# Patient Record
Sex: MALE | Race: WHITE | Employment: OTHER | URBAN - METROPOLITAN AREA
[De-identification: names, ages, dates, MRNs, and addresses within clinical notes are randomized per-mention and may not be internally consistent; named-entity substitution may affect disease eponyms.]

---

## 2017-02-17 ENCOUNTER — HOSPITAL ENCOUNTER (OUTPATIENT)
Dept: CT IMAGING | Age: 77
Discharge: HOME | End: 2017-02-17
Attending: NURSE PRACTITIONER
Payer: MEDICARE

## 2017-02-17 VITALS — BODY MASS INDEX: 26.92 KG/M2 | WEIGHT: 188 LBS | HEIGHT: 70 IN

## 2017-02-17 DIAGNOSIS — R31.0 GROSS HEMATURIA: ICD-10-CM

## 2017-02-17 LAB — CREAT BLD-MCNC: 1.2 MG/DL (ref 0.6–1)

## 2017-02-17 PROCEDURE — 74011000258 HC RX REV CODE- 258: Performed by: NURSE PRACTITIONER

## 2017-02-17 PROCEDURE — 74011636320 HC RX REV CODE- 636/320: Performed by: NURSE PRACTITIONER

## 2017-02-17 PROCEDURE — 74178 CT ABD&PLV WO CNTR FLWD CNTR: CPT

## 2017-02-17 PROCEDURE — 82565 ASSAY OF CREATININE: CPT

## 2017-02-17 RX ORDER — SODIUM CHLORIDE 0.9 % (FLUSH) 0.9 %
10 SYRINGE (ML) INJECTION
Status: COMPLETED | OUTPATIENT
Start: 2017-02-17 | End: 2017-02-17

## 2017-02-17 RX ADMIN — Medication 10 ML: at 16:50

## 2017-02-17 RX ADMIN — SODIUM CHLORIDE 100 ML: 900 INJECTION, SOLUTION INTRAVENOUS at 16:50

## 2017-02-17 RX ADMIN — IOVERSOL 100 ML: 741 INJECTION INTRA-ARTERIAL; INTRAVENOUS at 16:50

## 2017-02-24 PROCEDURE — 88112 CYTOPATH CELL ENHANCE TECH: CPT | Performed by: UROLOGY

## 2017-02-27 ENCOUNTER — HOSPITAL ENCOUNTER (OUTPATIENT)
Dept: LAB | Age: 77
Discharge: HOME | End: 2017-02-27

## 2017-04-20 ENCOUNTER — HOSPITAL ENCOUNTER (EMERGENCY)
Age: 77
Discharge: HOME | End: 2017-04-20
Attending: EMERGENCY MEDICINE
Payer: MEDICARE

## 2017-04-20 VITALS
RESPIRATION RATE: 22 BRPM | WEIGHT: 187 LBS | HEIGHT: 70 IN | SYSTOLIC BLOOD PRESSURE: 182 MMHG | OXYGEN SATURATION: 98 % | TEMPERATURE: 98.4 F | DIASTOLIC BLOOD PRESSURE: 81 MMHG | HEART RATE: 56 BPM | BODY MASS INDEX: 26.77 KG/M2

## 2017-04-20 DIAGNOSIS — R31.9 BLOOD IN URINE: Primary | ICD-10-CM

## 2017-04-20 LAB
ALBUMIN SERPL BCP-MCNC: 3.8 G/DL (ref 3.2–4.6)
ALBUMIN/GLOB SERPL: 1.1 {RATIO} (ref 1.2–3.5)
ALP SERPL-CCNC: 57 U/L (ref 50–136)
ALT SERPL-CCNC: 21 U/L (ref 12–65)
ANION GAP BLD CALC-SCNC: 6 MMOL/L (ref 7–16)
AST SERPL W P-5'-P-CCNC: 19 U/L (ref 15–37)
BACTERIA URNS QL MICRO: 0 /HPF
BASOPHILS # BLD AUTO: 0 K/UL (ref 0–0.2)
BASOPHILS # BLD: 0 % (ref 0–2)
BILIRUB SERPL-MCNC: 0.4 MG/DL (ref 0.2–1.1)
BUN SERPL-MCNC: 21 MG/DL (ref 8–23)
CALCIUM SERPL-MCNC: 8.9 MG/DL (ref 8.3–10.4)
CASTS URNS QL MICRO: NORMAL /LPF
CHLORIDE SERPL-SCNC: 107 MMOL/L (ref 98–107)
CO2 SERPL-SCNC: 30 MMOL/L (ref 21–32)
CREAT SERPL-MCNC: 1.52 MG/DL (ref 0.8–1.5)
CRYSTALS URNS QL MICRO: 0 /LPF
DIFFERENTIAL METHOD BLD: ABNORMAL
EOSINOPHIL # BLD: 0.2 K/UL (ref 0–0.8)
EOSINOPHIL NFR BLD: 4 % (ref 0.5–7.8)
EPI CELLS #/AREA URNS HPF: NORMAL /HPF
ERYTHROCYTE [DISTWIDTH] IN BLOOD BY AUTOMATED COUNT: 13.2 % (ref 11.9–14.6)
GLOBULIN SER CALC-MCNC: 3.6 G/DL (ref 2.3–3.5)
GLUCOSE SERPL-MCNC: 86 MG/DL (ref 65–100)
HCT VFR BLD AUTO: 36.4 % (ref 41.1–50.3)
HGB BLD-MCNC: 12.8 G/DL (ref 13.6–17.2)
IMM GRANULOCYTES # BLD: 0 K/UL (ref 0–0.5)
IMM GRANULOCYTES NFR BLD AUTO: 0.3 % (ref 0–5)
LYMPHOCYTES # BLD AUTO: 33 % (ref 13–44)
LYMPHOCYTES # BLD: 2 K/UL (ref 0.5–4.6)
MCH RBC QN AUTO: 30.2 PG (ref 26.1–32.9)
MCHC RBC AUTO-ENTMCNC: 35.2 G/DL (ref 31.4–35)
MCV RBC AUTO: 85.8 FL (ref 79.6–97.8)
MONOCYTES # BLD: 0.6 K/UL (ref 0.1–1.3)
MONOCYTES NFR BLD AUTO: 9 % (ref 4–12)
MUCOUS THREADS URNS QL MICRO: 0 /LPF
NEUTS SEG # BLD: 3.2 K/UL (ref 1.7–8.2)
NEUTS SEG NFR BLD AUTO: 54 % (ref 43–78)
OTHER OBSERVATIONS,UCOM: NORMAL
PLATELET # BLD AUTO: 144 K/UL (ref 150–450)
PMV BLD AUTO: 9.9 FL (ref 10.8–14.1)
POTASSIUM SERPL-SCNC: 4.1 MMOL/L (ref 3.5–5.1)
PROT SERPL-MCNC: 7.4 G/DL (ref 6.3–8.2)
RBC # BLD AUTO: 4.24 M/UL (ref 4.23–5.67)
RBC #/AREA URNS HPF: >100 /HPF
SODIUM SERPL-SCNC: 143 MMOL/L (ref 136–145)
WBC # BLD AUTO: 6 K/UL (ref 4.3–11.1)
WBC URNS QL MICRO: NORMAL /HPF

## 2017-04-20 PROCEDURE — 85025 COMPLETE CBC W/AUTO DIFF WBC: CPT | Performed by: EMERGENCY MEDICINE

## 2017-04-20 PROCEDURE — 81003 URINALYSIS AUTO W/O SCOPE: CPT

## 2017-04-20 PROCEDURE — 99283 EMERGENCY DEPT VISIT LOW MDM: CPT

## 2017-04-20 PROCEDURE — 80053 COMPREHEN METABOLIC PANEL: CPT | Performed by: EMERGENCY MEDICINE

## 2017-04-20 PROCEDURE — 96360 HYDRATION IV INFUSION INIT: CPT

## 2017-04-20 PROCEDURE — 81015 MICROSCOPIC EXAM OF URINE: CPT | Performed by: EMERGENCY MEDICINE

## 2017-04-20 PROCEDURE — 74011250636 HC RX REV CODE- 250/636: Performed by: EMERGENCY MEDICINE

## 2017-04-20 RX ADMIN — SODIUM CHLORIDE 1000 ML: 900 INJECTION, SOLUTION INTRAVENOUS at 18:10

## 2017-04-20 NOTE — ED PROVIDER NOTES
HPI Comments: Patient is a 79 yo male who is coming in with abdominal discomfort and intermittent discomfort, but his main complaint has been bleeding from his ileostomy. His head this off and on for months. Has been followed by urology for this. He has an appointment Dr. Acosta Covington tomorrow. They have tried cautery vitamin C and vinegar. Patient states he has no abdominal pain currently. He states he felt lightheaded and slightly anxious earlier and was crying in the waiting room. He is unsure why this happened because now he feels fine. Patient is a 78 y.o. male presenting with abdominal pain. The history is provided by the patient. Abdominal Pain    Pertinent negatives include no fever, no diarrhea, no nausea, no vomiting and no chest pain.         Past Medical History:   Diagnosis Date    Arthritis     OA- bilat knees    Bladder absent     stoma present    CAD (coronary artery disease)     CABG x 4 in 2004 - no damage- emergenc CABG- Cath 1/2011- total occlusion ramus, total mid occlusion left anterior descending, 50-70% stenosis of mid right coronary artery- \"Normal LEF\"    Cancer ([facility] [address] 1/1/2008    bladder- chemo in bladder then 7/11 bladder removed    Heart murmur     Hypercholesteremia     Hypertension     Infection     to s/p hernia wound 9/2010    Malignant neoplasm of bladder, part unspecified     Other abnormality of urination     Other complication of colostomy or enterostomy     Peristomal hernia     Unspecified intestinal obstruction (HCC)     Ventral hernia, unspecified, without mention of obstruction or gangrene        Past Surgical History:   Procedure Laterality Date    CARDIAC SURG PROCEDURE UNLIST  2004    CABG x 4    HX COLONOSCOPY  1/07    repeat 10 years    HX HEART CATHETERIZATION  2011    HX HERNIA REPAIR  8/26/11    hernia repair    HX HERNIA REPAIR  9/2011    9 days later emergency s/p hernia repair surg    HX VASECTOMY  1974    REMV BLADDER,TOTAL  7/2008 ostomy         Family History:   Problem Relation Age of Onset    Heart Disease Father 37     MI    Hypertension Father     Heart Disease Sister     Arthritis-osteo Sister     Cancer Mother      OVARY    Diabetes Sister     Hypertension Sister        Social History     Social History    Marital status:      Spouse name: N/A    Number of children: N/A    Years of education: N/A     Occupational History    Not on file. Social History Main Topics    Smoking status: Never Smoker    Smokeless tobacco: Never Used    Alcohol use No    Drug use: No    Sexual activity: No     Other Topics Concern    Not on file     Social History Narrative    Wife  2016, two sons         ALLERGIES: Review of patient's allergies indicates no known allergies. Review of Systems   Constitutional: Negative for chills and fever. Respiratory: Negative for chest tightness, shortness of breath, wheezing and stridor. Cardiovascular: Negative for chest pain and palpitations. Gastrointestinal: Positive for abdominal pain. Negative for diarrhea, nausea and vomiting. Skin: Negative. All other systems reviewed and are negative. Vitals:    17 1603 17 1641   BP: 175/86 182/81   Pulse: (!) 55 (!) 56   Resp: 18 22   Temp: 98.4 °F (36.9 °C)    SpO2: 99% 100%   Weight: 84.8 kg (187 lb)    Height: 5' 10\" (1.778 m)             Physical Exam   Constitutional: He is oriented to person, place, and time. He appears well-developed and well-nourished. No distress. HENT:   Head: Normocephalic and atraumatic. Eyes: Conjunctivae are normal. No scleral icterus. Neck: Normal range of motion. Neck supple. Cardiovascular: Normal rate, regular rhythm, normal heart sounds and intact distal pulses. Pulmonary/Chest: Breath sounds normal. No stridor. No respiratory distress. He has no wheezes. He has no rales. He exhibits no tenderness. Abdominal: Soft. He exhibits no distension.  There is no tenderness. There is no rebound and no guarding. Ileostomy present with no surrounding tenderness no erythema the bag does contain dark fluid resembling a mixture of blood. Neurological: He is alert and oriented to person, place, and time. No focal weakness   Skin: Skin is warm and dry. No rash noted. He is not diaphoretic. No erythema. Psychiatric: He has a normal mood and affect. His behavior is normal.   Nursing note and vitals reviewed. MDM  Number of Diagnoses or Management Options  Blood in urine:   Diagnosis management comments: Patient currently feeling much better comfortable with following up with his urologist tomorrow or Giardia has an appointment. His creatinine was slightly up and did hydrate him. Urine has red blood and white blood cells in it no bacteria. Trav Palmer MD; 4/20/2017 @7:03 PM Voice dictation software was used during the making of this note. This software is not perfect and grammatical and other typographical errors may be present.   This note has not been proofread for errors.  ===================================================================        Amount and/or Complexity of Data Reviewed  Clinical lab tests: ordered and reviewed (Results for orders placed or performed during the hospital encounter of 04/20/17  -CBC WITH AUTOMATED DIFF       Result                                            Value                         Ref Range                       WBC                                               6.0                           4.3 - 11.1 K/uL                 RBC                                               4.24                          4.23 - 5.67 M/uL                HGB                                               12.8 (L)                      13.6 - 17.2 g/dL                HCT                                               36.4 (L)                      41.1 - 50.3 %                   MCV                                               85.8 79.6 - 97.8 FL                  MCH                                               30.2                          26.1 - 32.9 PG                  MCHC                                              35.2 (H)                      31.4 - 35.0 g/dL                RDW                                               13.2                          11.9 - 14.6 %                   PLATELET                                          144 (L)                       150 - 450 K/uL                  MPV                                               9.9 (L)                       10.8 - 14.1 FL                  DF                                                AUTOMATED                                                     NEUTROPHILS                                       54                            43 - 78 %                       LYMPHOCYTES                                       33                            13 - 44 %                       MONOCYTES                                         9                             4.0 - 12.0 %                    EOSINOPHILS                                       4                             0.5 - 7.8 %                     BASOPHILS                                         0                             0.0 - 2.0 %                     IMMATURE GRANULOCYTES                             0.3                           0.0 - 5.0 %                     ABS. NEUTROPHILS                                  3.2                           1.7 - 8.2 K/UL                  ABS. LYMPHOCYTES                                  2.0                           0.5 - 4.6 K/UL                  ABS. MONOCYTES                                    0.6                           0.1 - 1.3 K/UL                  ABS. EOSINOPHILS                                  0.2                           0.0 - 0.8 K/UL                  ABS.  BASOPHILS                                    0.0                           0.0 - 0.2 K/UL ABS. IMM.  GRANS.                                  0.0                           0.0 - 0.5 K/UL             -METABOLIC PANEL, COMPREHENSIVE       Result                                            Value                         Ref Range                       Sodium                                            143                           136 - 145 mmol/L                Potassium                                         4.1                           3.5 - 5.1 mmol/L                Chloride                                          107                           98 - 107 mmol/L                 CO2                                               30                            21 - 32 mmol/L                  Anion gap                                         6 (L)                         7 - 16 mmol/L                   Glucose                                           86                            65 - 100 mg/dL                  BUN                                               21                            8 - 23 MG/DL                    Creatinine                                        1.52 (H)                      0.8 - 1.5 MG/DL                 GFR est AA                                        58 (L)                        >60 ml/min/1.73m2               GFR est non-AA                                    48 (L)                        >60 ml/min/1.73m2               Calcium                                           8.9                           8.3 - 10.4 MG/DL                Bilirubin, total                                  0.4                           0.2 - 1.1 MG/DL                 ALT (SGPT)                                        21                            12 - 65 U/L                     AST (SGOT)                                        19                            15 - 37 U/L                     Alk. phosphatase                                  57                            50 - 136 U/L                    Protein, total                                    7.4                           6.3 - 8.2 g/dL                  Albumin                                           3.8                           3.2 - 4.6 g/dL                  Globulin                                          3.6 (H)                       2.3 - 3.5 g/dL                  A-G Ratio                                         1.1 (L)                       1.2 - 3.5                 )      ED Course       Procedures

## 2017-04-20 NOTE — DISCHARGE INSTRUCTIONS

## 2017-04-20 NOTE — ED TRIAGE NOTES
Pt arrive c/o bleeding in stoma bag that started x6 weeks ago. Denies bleeding around stoma site. Pt also c/o lower abd pain.

## 2017-04-21 ENCOUNTER — PATIENT OUTREACH (OUTPATIENT)
Dept: CASE MANAGEMENT | Age: 77
End: 2017-04-21

## 2017-04-21 PROBLEM — R31.0 HEMATURIA, GROSS: Status: ACTIVE | Noted: 2017-04-21

## 2017-04-21 NOTE — PROGRESS NOTES
This note will not be viewable in [address] Transition of Care Discharge Follow-Up Questionnaire         Date/Time of Call:   April 21, 2017 2:28PM   What was the patient hospitalized for? Patient seen in ED for Blood in Urine. Does the patient understand his/her diagnosis and/or treatment and what happened during the hospitalization? Patient states understanding of diagnosis and treatment during hospitalization. Did the patient receive discharge instructions? Patient states discharge instructions explained and received before discharge to home. Review any discharge instructions (see notes in [facility]). Ask patient if they understand these. Do they have any questions? Patient states no questions at this time. Were home services ordered (nursing, PT, OT, ST, etc.)? Patient states no home health services ordered. If so, has the first visit occurred? If not, why? (Assist with coordination of services if necessary. ) NA         Was any DME ordered? Patient states no durable medical equipment ordered. If so, has it been received? If not, why?  (Assist with coordination of arranging DME orders if necessary. ) NA         Complete a review of all medications (new, continued and discontinued meds per the D/C instructions and medication tab in [facility]). Care Coordinator reviewed all medications with patient per [facility], no new medications prescribed. Were all new prescriptions filled? If not, why?  (Assist with obtainment of medications if necessary. ) NA         Does the patient understand the purpose and dosing instructions for all medications? (If patient has questions, provide explanation and education.) Patient states understanding of medication purposes and dosing instructions. Does the patient have any problems in performing ADLs? (If patient is unable to perform ADLs  what is the limiting factor(s)?   Do they have a support system that can assist? If no support system is present, discuss possible assistance that they may be able to obtain.) Patient states he is independent with ADL's and ambulation. Patient states he is feeling well and doing okay. Does the patient have all follow-up appointments scheduled? Has transportation been arranged? [facility] Pulmonary follow-up should be within 7 days of discharge; all others should have PCP follow-up within 7 days of discharge; follow-ups with other specialists as appropriate or ordered.) Patient states follow up appointment scheduled with [facility], April 1, 2017 @ 3:40PM with Dr. Naye Forde. Care Coordinator advised patient to schedule hospital follow up with (PCP) Dr. Grazyna Quijano within 7 business days. Patient states he will schedule follow up appointment. Patient states that he has no transportation needs. Any other questions or concerns expressed by the patient? Patient states no questions or concerns. Schedule next appointment with DAGOBERTO SEGOVIA Coordinator or refer to RN Case Manager/  as appropriate. No further needs identified, patient instructed to call Care Coordinator if further questions or concerns arise.    RICH[place]D Call Completed By: Sheryle Percy, LPN  Care Coordinator   Good Help DEENA

## 2017-05-12 ENCOUNTER — PATIENT OUTREACH (OUTPATIENT)
Dept: CASE MANAGEMENT | Age: 77
End: 2017-05-12

## 2017-05-12 NOTE — PROGRESS NOTES
Follow Up Call- Care Coordinator spoke with patient Mr. Cathy Winters who states he is doing pretty well, and has been seen by PCP (Bonnie Colbert)  and Urologist (Dr. Helena Li) since discharge from hospital. Mr. Merrick Downs states his spouse of 46 years passed away November 2016, patient states he had been depressed but is doing well and being active with [employer] and interacting with others. Mr. Merrick Downs states he has no additional needs, and thanked Care Coordinator for follow up call. his note will not be viewable in [address]

## 2017-05-26 ENCOUNTER — PATIENT OUTREACH (OUTPATIENT)
Dept: CASE MANAGEMENT | Age: 77
End: 2017-05-26

## 2017-05-26 NOTE — PROGRESS NOTES
Follow Up Call- Care Coordinator spoke with patient Mr. Leanne Dai who states he is doing well, and keeping himself active. Patient states he has completed follow up appointments, and states follow up visits went well. Patient states he has no questions or concerns at this. This note will not be viewable in [address]

## 2017-07-31 PROBLEM — E78.2 MIXED HYPERLIPIDEMIA: Status: ACTIVE | Noted: 2017-07-31

## 2017-07-31 PROBLEM — I10 ESSENTIAL HYPERTENSION: Status: ACTIVE | Noted: 2017-07-31

## 2017-12-12 PROBLEM — R31.0 HEMATURIA, GROSS: Status: RESOLVED | Noted: 2017-04-21 | Resolved: 2017-12-12

## 2018-08-06 ENCOUNTER — HOSPITAL ENCOUNTER (EMERGENCY)
Age: 78
Discharge: HOME | End: 2018-08-06
Attending: EMERGENCY MEDICINE
Payer: MEDICARE

## 2018-08-06 VITALS
TEMPERATURE: 98.3 F | HEART RATE: 74 BPM | BODY MASS INDEX: 27.2 KG/M2 | DIASTOLIC BLOOD PRESSURE: 80 MMHG | OXYGEN SATURATION: 96 % | SYSTOLIC BLOOD PRESSURE: 155 MMHG | RESPIRATION RATE: 20 BRPM | WEIGHT: 190 LBS | HEIGHT: 70 IN

## 2018-08-06 DIAGNOSIS — N47.2 PARAPHIMOSIS: Primary | ICD-10-CM

## 2018-08-06 PROCEDURE — 99283 EMERGENCY DEPT VISIT LOW MDM: CPT | Performed by: EMERGENCY MEDICINE

## 2018-08-06 NOTE — DISCHARGE INSTRUCTIONS
Paraphimosis: Care Instructions  Your Care Instructions  Paraphimosis (say \"[id number]-MOH-[id number]\") is a problem with the skin on your penis. The skin that folds over the penis (foreskin) gets tight and sticks behind the head of the penis. The skin can't return to its normal place over the head of the penis. This only occurs in men who still have all or part of their foreskin. This problem needs to be treated right away. If it's not treated, the penis will swell. Then blood flow to the head of the penis may be cut off. This can damage the penis. And it can be very painful. Your doctor probably reduced the swelling and put the foreskin in its normal place. Your doctor may have done surgery if the problem was severe. Your doctor may suggest that you be circumcised. This can prevent the problem from happening again. Follow-up care is a key part of your treatment and safety. Be sure to make and go to all appointments, and call your doctor if you are having problems. It's also a good idea to know your test results and keep a list of the medicines you take. How can you care for yourself at home? · Take pain medicines exactly as directed. ¨ If the doctor gave you a prescription medicine for pain, take it as prescribed. ¨ If you are not taking a prescription pain medicine, ask your doctor if you can take an over-the-counter medicine. · If the doctor used surgery, follow the directions you are given. Prevention  · Clean your penis every day. Pull the foreskin back. Then carefully wash the whole area with warm water. After washing, return the foreskin to its normal position. · Always return the foreskin to its normal position if it has been pulled back. This may happen during sex. Or you may pull it back before sex, before you urinate, or when you clean your penis. · Be sure the foreskin is in its normal position after any doctor exam or procedure.  For example, the foreskin may be pulled back to use a catheter. · If the problem happened after using jewelry in a piercing, do not use the jewelry again. · If the problem happened during sex, do not have sex for a few days. When should you call for help? Call your doctor now or seek immediate medical care if:    · The foreskin is stuck behind the head of the penis.     · You have symptoms of infection, such as:  ¨ Increased pain, swelling, warmth, or redness. ¨ Red streaks leading from the area. ¨ Pus draining from the area. ¨ A fever.    Watch closely for changes in your health, and be sure to contact your doctor if you have any problems. Where can you learn more? Go to http://kenya-juan jose.info/. Enter A187 in the search box to learn more about \"Paraphimosis: Care Instructions. \"  Current as of: May 12, 2017  Content Version: 11.7  © 0772-0588 [employer]. Care instructions adapted under license by [employer] (which disclaims liability or warranty for this information). If you have questions about a medical condition or this instruction, always ask your healthcare professional. [place] [address] any warranty or liability for your use of this information.

## 2018-08-06 NOTE — ED PROVIDER NOTES
HPI Comments: Patient presents primarily due to swelling of the foreskin of the penis. Patient states he's had redness and irritation for the past few days and was applying some over-the-counter anabolic ointment and another cream and then after retracting the foreskin earlier today it had swollen and he is no longer able to reduce the foreskin. 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  Patient states his back pain, that was noted by the triage nurse is not a concern for him at this time    Patient is a 70 y.o. male presenting with penile swelling. The history is provided by the patient. Penis Swelling   This is a new problem. The current episode started 6 to 12 hours ago. The problem occurs constantly. The problem has not changed since onset. Primary symptoms include penile pain. Pertinent negatives include no genital itching, no genital lesions, no genital rash, no penile discharge, no testicular mass, no swelling, no scrotal pain, no priapism and no inability to urinate. There has been no fever. He has tried nothing for the symptoms. Sexual activity: non-contributory.          Past Medical History:   Diagnosis Date    Arthritis     OA- bilat knees    Bladder absent     stoma present    Bladder cancer ([address]     CAD (coronary artery disease)     CABG x 4 in 2004 - no damage- emergenc CABG- Cath 1/2011- total occlusion ramus, total mid occlusion left anterior descending, 50-70% stenosis of mid right coronary artery- \"Normal LEF\"    Cancer ([address] 1/1/2008    bladder- chemo in bladder then 7/11 bladder removed    CRI (chronic renal insufficiency), stage 3 (moderate)     Depression     Family history of diabetes mellitus     Heart murmur     mild mitral regurgitation    Hypercholesteremia     Hypertension     Infection     to s/p hernia wound 9/2010    Malignant neoplasm of bladder, part unspecified     Other abnormality of urination(788.69)     Other complication of colostomy or enterostomy     Peristomal hernia     Unspecified intestinal obstruction     Ventral hernia, unspecified, without mention of obstruction or gangrene        Past Surgical History:   Procedure Laterality Date    CARDIAC SURG PROCEDURE UNLIST      CABG x 4    HX COLONOSCOPY      repeat 10 years    HX HEART CATHETERIZATION      HX HERNIA REPAIR  11    hernia repair    HX HERNIA REPAIR  2011    9 days later emergency s/p hernia repair surg    HX VASECTOMY  1974    [facility]  2008    ostomy         Family History:   Problem Relation Age of Onset    Heart Disease Father 37     MI    Hypertension Father     Heart Disease Sister     Arthritis-osteo Sister     Cancer Mother      OVARY    Diabetes Sister     Hypertension Sister        Social History     Social History    Marital status:      Spouse name: N/A    Number of children: N/A    Years of education: N/A     Occupational History    Not on file. Social History Main Topics    Smoking status: Never Smoker    Smokeless tobacco: Never Used    Alcohol use No    Drug use: No    Sexual activity: No     Other Topics Concern    Not on file     Social History Narrative    Wife  2016, two sons         ALLERGIES: Review of patient's allergies indicates no known allergies. Review of Systems   Genitourinary: Positive for penile pain and penile swelling. Negative for penile discharge. All other systems reviewed and are negative. Vitals:    18 0243   BP: 157/81   Pulse: 76   Resp: 20   Temp: 98.3 °F (36.8 °C)   SpO2: 95%   Weight: 86.2 kg (190 lb)   Height: 5' 10\" (1.778 m)            Physical Exam   Constitutional: He is oriented to person, place, and time. He appears well-developed and well-nourished. HENT:   Head: Normocephalic and atraumatic.    Eyes: Conjunctivae and EOM are normal. Pupils are equal, round, and reactive to light. Genitourinary: No swelling in the scrotum or testes. Genitourinary Comments: Foreskin is retracted and swollen at the base of the glans. Mild erythema is noted. No ulcers or other lesions are noted no evidence of  Cellulitis or gangrene. Neurological: He is alert and oriented to person, place, and time. Skin: Skin is warm and dry. Psychiatric: He has a normal mood and affect. His behavior is normal.   Nursing note and vitals reviewed. MDM  Number of Diagnoses or Management Options  Paraphimosis:   Diagnosis management comments: With gentle compression and manipulation the foreskin was subsequently successfully reduced.     Risk of Complications, Morbidity, and/or Mortality  Presenting problems: low  Diagnostic procedures: minimal  Management options: low    Patient Progress  Patient progress: stable        ED Course       Procedures

## 2018-08-06 NOTE — ED NOTES
I have reviewed discharge instructions with the patient. The patient verbalized understanding. Patient left ED via Discharge Method: ambulatory to Home with self    Opportunity for questions and clarification provided. Patient given 0 scripts. Pt in no acute distress on discharge         To continue your aftercare when you leave the hospital, you may receive an automated call from our care team to check in on how you are doing. This is a free service and part of our promise to provide the best care and service to meet your aftercare needs.  If you have questions, or wish to unsubscribe from this service please call [phone]. Thank you for Choosing our [facility] Emergency Department.

## 2018-08-06 NOTE — ED TRIAGE NOTES
Patient states foreskin swelling since Saturday. Patient has a urostomy.   patient also states lower back pain

## 2021-06-18 ENCOUNTER — HOSPITAL ENCOUNTER (INPATIENT)
Age: 81
LOS: 4 days | Discharge: HOME HEALTH | DRG: 871 | End: 2021-06-22
Attending: EMERGENCY MEDICINE | Admitting: HOSPITALIST
Payer: MEDICARE

## 2021-06-18 ENCOUNTER — APPOINTMENT (OUTPATIENT)
Dept: GENERAL RADIOLOGY | Age: 81
DRG: 871 | End: 2021-06-18
Attending: EMERGENCY MEDICINE
Payer: MEDICARE

## 2021-06-18 ENCOUNTER — APPOINTMENT (OUTPATIENT)
Dept: CT IMAGING | Age: 81
DRG: 871 | End: 2021-06-18
Attending: EMERGENCY MEDICINE
Payer: MEDICARE

## 2021-06-18 DIAGNOSIS — N20.1 LEFT URETERAL STONE: ICD-10-CM

## 2021-06-18 DIAGNOSIS — N39.0 ACUTE UTI: Primary | ICD-10-CM

## 2021-06-18 DIAGNOSIS — N13.30 HYDRONEPHROSIS OF LEFT KIDNEY: ICD-10-CM

## 2021-06-18 DIAGNOSIS — Z93.6 S/P ILEAL CONDUIT (HCC): ICD-10-CM

## 2021-06-18 DIAGNOSIS — R41.82 ALTERED MENTAL STATUS, UNSPECIFIED ALTERED MENTAL STATUS TYPE: ICD-10-CM

## 2021-06-18 DIAGNOSIS — N13.30 HYDRONEPHROSIS: ICD-10-CM

## 2021-06-18 PROBLEM — R65.20 SEVERE SEPSIS (HCC): Status: ACTIVE | Noted: 2021-06-18

## 2021-06-18 PROBLEM — A41.9 SEVERE SEPSIS (HCC): Status: ACTIVE | Noted: 2021-06-18

## 2021-06-18 PROBLEM — G93.40 ACUTE ENCEPHALOPATHY: Status: ACTIVE | Noted: 2021-06-18

## 2021-06-18 LAB
ALBUMIN SERPL-MCNC: 3.5 G/DL (ref 3.2–4.6)
ALBUMIN/GLOB SERPL: 0.9 {RATIO} (ref 1.2–3.5)
ALP SERPL-CCNC: 45 U/L (ref 50–136)
ALT SERPL-CCNC: 23 U/L (ref 12–65)
AMORPH CRY URNS QL MICRO: ABNORMAL
ANION GAP SERPL CALC-SCNC: 8 MMOL/L (ref 7–16)
APPEARANCE UR: ABNORMAL
AST SERPL-CCNC: 17 U/L (ref 15–37)
ATRIAL RATE: 78 BPM
BACTERIA URNS QL MICRO: ABNORMAL /HPF
BASOPHILS # BLD: 0 K/UL (ref 0–0.2)
BASOPHILS NFR BLD: 0 % (ref 0–2)
BILIRUB SERPL-MCNC: 0.9 MG/DL (ref 0.2–1.1)
BILIRUB UR QL: NEGATIVE
BUN SERPL-MCNC: 24 MG/DL (ref 8–23)
CALCIUM SERPL-MCNC: 8.8 MG/DL (ref 8.3–10.4)
CALCULATED P AXIS, ECG09: 78 DEGREES
CALCULATED R AXIS, ECG10: 58 DEGREES
CALCULATED T AXIS, ECG11: 83 DEGREES
CHLORIDE SERPL-SCNC: 104 MMOL/L (ref 98–107)
CK SERPL-CCNC: 255 U/L (ref 21–215)
CO2 SERPL-SCNC: 25 MMOL/L (ref 21–32)
COLOR UR: ABNORMAL
CREAT SERPL-MCNC: 1.74 MG/DL (ref 0.8–1.5)
DIAGNOSIS, 93000: NORMAL
DIFFERENTIAL METHOD BLD: ABNORMAL
EOSINOPHIL # BLD: 0.1 K/UL (ref 0–0.8)
EOSINOPHIL NFR BLD: 1 % (ref 0.5–7.8)
ERYTHROCYTE [DISTWIDTH] IN BLOOD BY AUTOMATED COUNT: 13.6 % (ref 11.9–14.6)
GLOBULIN SER CALC-MCNC: 3.9 G/DL (ref 2.3–3.5)
GLUCOSE SERPL-MCNC: 130 MG/DL (ref 65–100)
GLUCOSE UR STRIP.AUTO-MCNC: NEGATIVE MG/DL
HCT VFR BLD AUTO: 38 % (ref 41.1–50.3)
HGB BLD-MCNC: 12.7 G/DL (ref 13.6–17.2)
HGB UR QL STRIP: ABNORMAL
IMM GRANULOCYTES # BLD AUTO: 0.1 K/UL (ref 0–0.5)
IMM GRANULOCYTES NFR BLD AUTO: 1 % (ref 0–5)
KETONES UR QL STRIP.AUTO: ABNORMAL MG/DL
LACTATE SERPL-SCNC: 1.5 MMOL/L (ref 0.4–2)
LACTATE SERPL-SCNC: 2.7 MMOL/L (ref 0.4–2)
LEUKOCYTE ESTERASE UR QL STRIP.AUTO: ABNORMAL
LYMPHOCYTES # BLD: 0.8 K/UL (ref 0.5–4.6)
LYMPHOCYTES NFR BLD: 8 % (ref 13–44)
MCH RBC QN AUTO: 31.6 PG (ref 26.1–32.9)
MCHC RBC AUTO-ENTMCNC: 33.4 G/DL (ref 31.4–35)
MCV RBC AUTO: 94.5 FL (ref 79.6–97.8)
MONOCYTES # BLD: 0.8 K/UL (ref 0.1–1.3)
MONOCYTES NFR BLD: 8 % (ref 4–12)
NEUTS SEG # BLD: 8.7 K/UL (ref 1.7–8.2)
NEUTS SEG NFR BLD: 83 % (ref 43–78)
NITRITE UR QL STRIP.AUTO: NEGATIVE
NRBC # BLD: 0 K/UL (ref 0–0.2)
OTHER OBSERVATIONS,UCOM: ABNORMAL
P-R INTERVAL, ECG05: 192 MS
PH UR STRIP: 7.5 [PH] (ref 5–9)
PLATELET # BLD AUTO: 121 K/UL (ref 150–450)
PMV BLD AUTO: 9.9 FL (ref 9.4–12.3)
POTASSIUM SERPL-SCNC: 4 MMOL/L (ref 3.5–5.1)
PROCALCITONIN SERPL-MCNC: 1.82 NG/ML
PROT SERPL-MCNC: 7.4 G/DL (ref 6.3–8.2)
PROT UR STRIP-MCNC: 100 MG/DL
Q-T INTERVAL, ECG07: 366 MS
QRS DURATION, ECG06: 102 MS
QTC CALCULATION (BEZET), ECG08: 417 MS
RBC # BLD AUTO: 4.02 M/UL (ref 4.23–5.6)
RBC #/AREA URNS HPF: ABNORMAL /HPF
SODIUM SERPL-SCNC: 137 MMOL/L (ref 138–145)
SP GR UR REFRACTOMETRY: 1.02 (ref 1–1.02)
UROBILINOGEN UR QL STRIP.AUTO: 0.2 EU/DL (ref 0.2–1)
VENTRICULAR RATE, ECG03: 78 BPM
WBC # BLD AUTO: 10.5 K/UL (ref 4.3–11.1)
WBC URNS QL MICRO: ABNORMAL /HPF

## 2021-06-18 PROCEDURE — 70450 CT HEAD/BRAIN W/O DYE: CPT

## 2021-06-18 PROCEDURE — 87086 URINE CULTURE/COLONY COUNT: CPT

## 2021-06-18 PROCEDURE — 94762 N-INVAS EAR/PLS OXIMTRY CONT: CPT

## 2021-06-18 PROCEDURE — 74011250636 HC RX REV CODE- 250/636: Performed by: HOSPITALIST

## 2021-06-18 PROCEDURE — 87150 DNA/RNA AMPLIFIED PROBE: CPT

## 2021-06-18 PROCEDURE — 71045 X-RAY EXAM CHEST 1 VIEW: CPT

## 2021-06-18 PROCEDURE — 82550 ASSAY OF CK (CPK): CPT

## 2021-06-18 PROCEDURE — 85025 COMPLETE CBC W/AUTO DIFF WBC: CPT

## 2021-06-18 PROCEDURE — 80053 COMPREHEN METABOLIC PANEL: CPT

## 2021-06-18 PROCEDURE — 96365 THER/PROPH/DIAG IV INF INIT: CPT

## 2021-06-18 PROCEDURE — 74011250637 HC RX REV CODE- 250/637: Performed by: EMERGENCY MEDICINE

## 2021-06-18 PROCEDURE — 74011250636 HC RX REV CODE- 250/636: Performed by: EMERGENCY MEDICINE

## 2021-06-18 PROCEDURE — 83605 ASSAY OF LACTIC ACID: CPT

## 2021-06-18 PROCEDURE — 84145 PROCALCITONIN (PCT): CPT

## 2021-06-18 PROCEDURE — 74011250637 HC RX REV CODE- 250/637: Performed by: HOSPITALIST

## 2021-06-18 PROCEDURE — 87077 CULTURE AEROBIC IDENTIFY: CPT

## 2021-06-18 PROCEDURE — 99285 EMERGENCY DEPT VISIT HI MDM: CPT

## 2021-06-18 PROCEDURE — 87205 SMEAR GRAM STAIN: CPT

## 2021-06-18 PROCEDURE — 81001 URINALYSIS AUTO W/SCOPE: CPT

## 2021-06-18 PROCEDURE — 93005 ELECTROCARDIOGRAM TRACING: CPT | Performed by: EMERGENCY MEDICINE

## 2021-06-18 PROCEDURE — 74011000258 HC RX REV CODE- 258: Performed by: EMERGENCY MEDICINE

## 2021-06-18 PROCEDURE — 87088 URINE BACTERIA CULTURE: CPT

## 2021-06-18 PROCEDURE — 87186 SC STD MICRODIL/AGAR DIL: CPT

## 2021-06-18 PROCEDURE — 87040 BLOOD CULTURE FOR BACTERIA: CPT

## 2021-06-18 PROCEDURE — 65270000029 HC RM PRIVATE

## 2021-06-18 PROCEDURE — 2709999900 HC NON-CHARGEABLE SUPPLY

## 2021-06-18 RX ORDER — FLUOXETINE 10 MG/1
20 CAPSULE ORAL DAILY
Status: DISCONTINUED | OUTPATIENT
Start: 2021-06-19 | End: 2021-06-22 | Stop reason: HOSPADM

## 2021-06-18 RX ORDER — ENOXAPARIN SODIUM 100 MG/ML
40 INJECTION SUBCUTANEOUS EVERY 24 HOURS
Status: DISCONTINUED | OUTPATIENT
Start: 2021-06-18 | End: 2021-06-19

## 2021-06-18 RX ORDER — ACETAMINOPHEN 650 MG/1
650 SUPPOSITORY RECTAL
Status: DISCONTINUED | OUTPATIENT
Start: 2021-06-18 | End: 2021-06-18

## 2021-06-18 RX ORDER — ASPIRIN 81 MG/1
81 TABLET ORAL DAILY
Status: DISCONTINUED | OUTPATIENT
Start: 2021-06-19 | End: 2021-06-22 | Stop reason: HOSPADM

## 2021-06-18 RX ORDER — SODIUM CHLORIDE 0.9 % (FLUSH) 0.9 %
5-10 SYRINGE (ML) INJECTION AS NEEDED
Status: DISCONTINUED | OUTPATIENT
Start: 2021-06-18 | End: 2021-06-22 | Stop reason: HOSPADM

## 2021-06-18 RX ORDER — SODIUM CHLORIDE 9 MG/ML
75 INJECTION, SOLUTION INTRAVENOUS CONTINUOUS
Status: DISCONTINUED | OUTPATIENT
Start: 2021-06-18 | End: 2021-06-19

## 2021-06-18 RX ORDER — FAMOTIDINE 20 MG/1
20 TABLET, FILM COATED ORAL EVERY 12 HOURS
Status: DISCONTINUED | OUTPATIENT
Start: 2021-06-18 | End: 2021-06-19

## 2021-06-18 RX ORDER — LOSARTAN POTASSIUM 50 MG/1
100 TABLET ORAL DAILY
Status: DISCONTINUED | OUTPATIENT
Start: 2021-06-19 | End: 2021-06-19

## 2021-06-18 RX ORDER — ONDANSETRON 2 MG/ML
4 INJECTION INTRAMUSCULAR; INTRAVENOUS
Status: DISCONTINUED | OUTPATIENT
Start: 2021-06-18 | End: 2021-06-22 | Stop reason: HOSPADM

## 2021-06-18 RX ORDER — SODIUM CHLORIDE 0.9 % (FLUSH) 0.9 %
5-40 SYRINGE (ML) INJECTION EVERY 8 HOURS
Status: DISCONTINUED | OUTPATIENT
Start: 2021-06-18 | End: 2021-06-22 | Stop reason: HOSPADM

## 2021-06-18 RX ORDER — ATORVASTATIN CALCIUM 40 MG/1
40 TABLET, FILM COATED ORAL
Status: DISCONTINUED | OUTPATIENT
Start: 2021-06-18 | End: 2021-06-19

## 2021-06-18 RX ORDER — ACETAMINOPHEN 325 MG/1
650 TABLET ORAL
Status: DISCONTINUED | OUTPATIENT
Start: 2021-06-18 | End: 2021-06-22 | Stop reason: HOSPADM

## 2021-06-18 RX ORDER — SODIUM CHLORIDE 0.9 % (FLUSH) 0.9 %
5-40 SYRINGE (ML) INJECTION AS NEEDED
Status: DISCONTINUED | OUTPATIENT
Start: 2021-06-18 | End: 2021-06-22 | Stop reason: HOSPADM

## 2021-06-18 RX ORDER — ACETAMINOPHEN 500 MG
1000 TABLET ORAL
Status: COMPLETED | OUTPATIENT
Start: 2021-06-18 | End: 2021-06-18

## 2021-06-18 RX ORDER — SODIUM CHLORIDE 0.9 % (FLUSH) 0.9 %
5-10 SYRINGE (ML) INJECTION EVERY 8 HOURS
Status: DISCONTINUED | OUTPATIENT
Start: 2021-06-18 | End: 2021-06-22 | Stop reason: HOSPADM

## 2021-06-18 RX ORDER — HYDROCODONE BITARTRATE AND ACETAMINOPHEN 5; 325 MG/1; MG/1
1 TABLET ORAL
Status: DISCONTINUED | OUTPATIENT
Start: 2021-06-18 | End: 2021-06-22 | Stop reason: HOSPADM

## 2021-06-18 RX ADMIN — SODIUM CHLORIDE 1000 ML: 900 INJECTION, SOLUTION INTRAVENOUS at 19:21

## 2021-06-18 RX ADMIN — CEFTRIAXONE 1 G: 1 INJECTION, POWDER, FOR SOLUTION INTRAMUSCULAR; INTRAVENOUS at 15:59

## 2021-06-18 RX ADMIN — FAMOTIDINE 20 MG: 20 TABLET ORAL at 20:58

## 2021-06-18 RX ADMIN — ENOXAPARIN SODIUM 40 MG: 40 INJECTION SUBCUTANEOUS at 19:55

## 2021-06-18 RX ADMIN — SODIUM CHLORIDE 1000 ML: 900 INJECTION, SOLUTION INTRAVENOUS at 15:59

## 2021-06-18 RX ADMIN — SODIUM CHLORIDE 75 ML/HR: 900 INJECTION, SOLUTION INTRAVENOUS at 19:56

## 2021-06-18 RX ADMIN — ACETAMINOPHEN 1000 MG: 500 TABLET ORAL at 16:01

## 2021-06-18 RX ADMIN — Medication 10 ML: at 20:59

## 2021-06-18 RX ADMIN — SODIUM CHLORIDE 1000 ML: 900 INJECTION, SOLUTION INTRAVENOUS at 15:58

## 2021-06-18 RX ADMIN — ATORVASTATIN CALCIUM 40 MG: 40 TABLET, FILM COATED ORAL at 21:00

## 2021-06-18 NOTE — ED PROVIDER NOTES
31-year-old male with history of CAD, hypertension, bladder cancer status post ileal conduit who presents with complaint of fever, chills, increased confusion and generalized weakness over the past 3 to 4 days. Reports malodorous urine from ileal conduit. Denies abdominal pain, diarrhea, constipation, flank pain, cough, shortness of breath, neck pain, sore throat, vision disturbance, numbness, tingling, weakness, slurred speech, facial droop. States the patient had mechanical trip and fall around 4 days ago did not hit his head or lose consciousness. Patient symptoms is moderate. Denies any recent sick contacts. States had fall on Thursday and was on ground for 4 hours until got up. The history is provided by the patient. No  was used. Altered mental status   This is a new problem. The current episode started more than 2 days ago. The problem has not changed since onset. Associated symptoms include confusion. Pertinent negatives include no weakness.  Mental status baseline is normal.         Past Medical History:   Diagnosis Date    Arthritis     OA- bilat knees    Bladder absent     stoma present    Bladder cancer ([facility] [address]     CAD (coronary artery disease)     CABG x 4 in 2004 - no damage- emergenc CABG- Cath 1/2011- total occlusion ramus, total mid occlusion left anterior descending, 50-70% stenosis of mid right coronary artery- \"Normal LEF\"    Cancer ([facility] [address] 1/1/2008    bladder- chemo in bladder then 7/11 bladder removed    CRI (chronic renal insufficiency), stage 3 (moderate) (HCC)     Depression     Family history of diabetes mellitus     Heart murmur     mild mitral regurgitation    Hypercholesteremia     Hypertension     Infection     to s/p hernia wound 0/4188    Other complication of colostomy or enterostomy     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    Followed by surgeon Dr Randal Chan Unspecified intestinal obstruction     Ventral hernia, unspecified, without mention of obstruction or gangrene        Past Surgical History:   Procedure Laterality Date    HX COLONOSCOPY      repeat 10 years    HX HEART CATHETERIZATION      HX HERNIA REPAIR  11    hernia repair    HX HERNIA REPAIR  2011    9 days later emergency s/p hernia repair surg    HX VASECTOMY  1974    IN CARDIAC SURG PROCEDURE UNLIST  2004    CABG x 4    IN REMV BLADDER,TOTAL  2008    ostomy         Family History:   Problem Relation Age of Onset    Heart Disease Father 37        MI    Hypertension Father     Heart Disease Sister     Arthritis-osteo Sister     Cancer Mother         OVARY    Diabetes Sister     Hypertension Sister        Social History     Socioeconomic History    Marital status:      Spouse name: Not on file    Number of children: Not on file    Years of education: Not on file    Highest education level: Not on file   Occupational History    Not on file   Tobacco Use    Smoking status: Never Smoker    Smokeless tobacco: Never Used   Substance and Sexual Activity    Alcohol use: No    Drug use: Never    Sexual activity: Not Currently   Other Topics Concern    Not on file   Social History Narrative    Wife  2016, two sons     Social Determinants of Health     Financial Resource Strain:     Difficulty of Paying Living Expenses:    Food Insecurity:     Worried About Running Out of Food in the Last Year:     [address] in the Last Year:    Transportation Needs:     Lack of Transportation (Medical):      Lack of Transportation (Non-Medical):    Physical Activity:     Days of Exercise per Week:     Minutes of Exercise per Session:    Stress:     Feeling of Stress :    Social Connections:     Frequency of Communication with Friends and Family:     Frequency of Social Gatherings with Friends and Family:     Attends Mu-ism Services:     Active Member of Clubs or Organizations:     Attends Club or Organization Meetings:     Marital Status:    Intimate Partner Violence:     Fear of Current or Ex-Partner:     Emotionally Abused:     Physically Abused:     Sexually Abused: ALLERGIES: Patient has no known allergies. Review of Systems   Constitutional: Positive for chills, fatigue and fever. Negative for diaphoresis. HENT: Negative for congestion, rhinorrhea and sore throat. Eyes: Negative for visual disturbance. Respiratory: Negative for cough and shortness of breath. Cardiovascular: Negative for chest pain. Gastrointestinal: Negative for abdominal pain, blood in stool, constipation, diarrhea, nausea and vomiting. Genitourinary: Negative for discharge, dysuria, flank pain, hematuria, scrotal swelling and testicular pain. Musculoskeletal: Positive for myalgias. Negative for arthralgias, back pain and gait problem. Skin: Negative for rash. Neurological: Negative for dizziness, syncope, facial asymmetry, weakness, light-headedness and headaches. Hematological: Does not bruise/bleed easily. Psychiatric/Behavioral: Positive for confusion. Vitals:    06/18/21 1347   BP: 135/67   Pulse: 84   Resp: 18   Temp: 99.5 °F (37.5 °C)   SpO2: 96%   Weight: 80.3 kg (177 lb)   Height: 5' 10\" (1.778 m)            Physical Exam  Vitals and nursing note reviewed. Constitutional:       Appearance: He is well-developed. HENT:      Head: Normocephalic. Mouth/Throat:      Mouth: Mucous membranes are moist.   Eyes:      Extraocular Movements: Extraocular movements intact. Pupils: Pupils are equal, round, and reactive to light. Neck:      Comments: FROM. No midline Cspine TTP. No step-off. Cardiovascular:      Rate and Rhythm: Normal rate and regular rhythm. Heart sounds: Normal heart sounds. Comments: Pulses 2+ and equal throughout. Pulmonary:      Effort: Pulmonary effort is normal.      Breath sounds: Normal breath sounds. Comments: CTAB. Abdominal:      General: There is no distension. Palpations: Abdomen is soft. Tenderness: There is no abdominal tenderness. Comments: Soft. Ileal conduit in place; c/d/i. No rebound or guarding. No CVAT. Musculoskeletal:         General: Normal range of motion. Cervical back: Normal range of motion. No rigidity. Comments: No deformities. Skin:     General: Skin is warm. Findings: No rash. Comments: No rash or cellulitis noted. Neurological:      Mental Status: He is alert and oriented to person, place, and time. Comments: No focal deficits. No meningismus. Strength 5/5 throughout. Normal sensory exam.          MDM  Number of Diagnoses or Management Options  Acute UTI: new and requires workup  Altered mental status, unspecified altered mental status type: new and requires workup  S/P ileal conduit [facility]): new and requires workup  Diagnosis management comments: Temp 99.5. Cr 1.74. LA 2.7. UA with findings suggestive of UTI. Rocephin 1 g IV given. 2L NS IVF bolus given. Urine culture added on. Tylenol 1 g po given. CT head negative. CXR w/ no infiltrate or consolidation. Will consult hospitalist for admission. Amount and/or Complexity of Data Reviewed  Clinical lab tests: ordered and reviewed  Tests in the radiology section of CPT®: ordered and reviewed  Tests in the medicine section of CPT®: ordered and reviewed  Review and summarize past medical records: yes  Independent visualization of images, tracings, or specimens: yes    Risk of Complications, Morbidity, and/or Mortality  Presenting problems: high  Diagnostic procedures: moderate  Management options: high    Patient Progress  Patient progress: stable    ED Course as of Jun 18 1557 Fri Jun 18, 2021   1428 CXR FINDINGS:   Support Lines and Tubes: None  Cardiac Silhouette: Prior median sternotomy. There is unchanged mild enlargement  of the cardiac silhouette. Lungs: No focal airspace disease. Pleura: No pleural effusion. No pneumothorax.   Osseous Structures: Unremarkable. Upper Abdomen: Unremarkable. IMPRESSION  1. Unchanged mild enlargement of the cardiac silhouette. 2. No focal airspace disease. [DF]   1554 Lactic acid(!): 2.7 [DF]   1556 Bacteria(!): 3+ [DF]   1556 Leukocyte Esterase(!): LARGE [DF]   1556 WBC: 20-50 [DF]   1556 CT head FINDINGS:     The ventricles are within normal limits for the degree of global brain parenchymal volume loss. There is no midline shift. The basilar cisterns are patent. There is no cerebellar tonsillar ectopia or herniation.     Hypodensities in the periventricular and subcortical white matter are nonspecific, but they are most likely to represent chronic microangiopathy.     There is no evidence of acute intracranial hemorrhage or extra-axial collections.     There is no CT evidence of acute infarction.     Small left maxillary sinus mucus retention cyst. No evidence of skull fracture.     IMPRESSION:  No acute intracranial abnormality. [DF]      ED Course User Index  [DF] Della Wright MD       EKG    Date/Time: 6/18/2021 3:34 PM  Performed by: Della Wright MD  Authorized by:  Della Wright MD     ECG reviewed by ED Physician in the absence of a cardiologist: yes    Rate:     ECG rate:  78    ECG rate assessment: normal    Rhythm:     Rhythm: sinus rhythm    Ectopy:     Ectopy: PAC    QRS:     QRS intervals:  Normal  Conduction:     Conduction: abnormal      Abnormal conduction: incomplete RBBB    ST segments:     ST segments:  Normal  T waves:     T waves: normal            Results Include:    Recent Results (from the past 24 hour(s))   METABOLIC PANEL, COMPREHENSIVE    Collection Time: 06/18/21  1:55 PM   Result Value Ref Range    Sodium 137 (L) 138 - 145 mmol/L    Potassium 4.0 3.5 - 5.1 mmol/L    Chloride 104 98 - 107 mmol/L    CO2 25 21 - 32 mmol/L    Anion gap 8 7 - 16 mmol/L    Glucose 130 (H) 65 - 100 mg/dL    BUN 24 (H) 8 - 23 MG/DL    Creatinine 1.74 (H) 0.8 - 1.5 MG/DL    GFR est AA 49 (L) >60 ml/min/1.73m2    GFR est non-AA 41 (L) >60 ml/min/1.73m2    Calcium 8.8 8.3 - 10.4 MG/DL    Bilirubin, total 0.9 0.2 - 1.1 MG/DL    ALT (SGPT) 23 12 - 65 U/L    AST (SGOT) 17 15 - 37 U/L    Alk. phosphatase 45 (L) 50 - 136 U/L    Protein, total 7.4 6.3 - 8.2 g/dL    Albumin 3.5 3.2 - 4.6 g/dL    Globulin 3.9 (H) 2.3 - 3.5 g/dL    A-G Ratio 0.9 (L) 1.2 - 3.5     URINALYSIS W/ RFLX MICROSCOPIC    Collection Time: 06/18/21  2:04 PM   Result Value Ref Range    Color CRISTOPHER      Appearance TURBID      Specific gravity 1.016 1.001 - 1.023      pH (UA) 7.5 5.0 - 9.0      Protein 100 (A) NEG mg/dL    Glucose Negative mg/dL    Ketone TRACE (A) NEG mg/dL    Bilirubin Negative NEG      Blood MODERATE (A) NEG      Urobilinogen 0.2 0.2 - 1.0 EU/dL    Nitrites Negative NEG      Leukocyte Esterase LARGE (A) NEG      WBC 20-50 0 /hpf    RBC 0-3 0 /hpf    Bacteria 3+ (H) 0 /hpf    Amorphous Crystals TRACE 0      Other observations RESULTS VERIFIED MANUALLY     EKG, 12 LEAD, INITIAL    Collection Time: 06/18/21  2:04 PM   Result Value Ref Range    Ventricular Rate 78 BPM    Atrial Rate 78 BPM    P-R Interval 192 ms    QRS Duration 102 ms    Q-T Interval 366 ms    QTC Calculation (Bezet) 417 ms    Calculated P Axis 78 degrees    Calculated R Axis 58 degrees    Calculated T Axis 83 degrees    Diagnosis       !! AGE AND GENDER SPECIFIC ECG ANALYSIS !! Sinus rhythm with Premature atrial complexes  Incomplete right bundle branch block  Borderline ECG  When compared with ECG of 16-JAN-2011 09:18,  Premature atrial complexes are now Present  Vent. rate has increased BY  33 BPM       Min Loyd MD; 6/18/2021 @3:34 PM Voice dictation software was used during the making of this note. This software is not perfect and grammatical and other typographical errors may be present.   This note has not been proofread for errors.  ===================================================================

## 2021-06-18 NOTE — PROGRESS NOTES
06/18/21 1818   Dual Skin Pressure Injury Assessment   Dual Skin Pressure Injury Assessment WDL   Primary Nurse Flavia Noble, JOSHUA and [facility] RN, RN performed a dual skin assessment on this patient No impairment noted  Justin score is {JUSTIN SCALE:

## 2021-06-18 NOTE — ED NOTES
Report received from [facility]. This RN assumes pt care at this time.

## 2021-06-18 NOTE — PROGRESS NOTES
Problem: Falls - Risk of  Goal: *Absence of Falls  Description: Document Ana Spare Fall Risk and appropriate interventions in the flowsheet.   Outcome: Progressing Towards Goal  Note: Fall Risk Interventions:            Medication Interventions: Bed/chair exit alarm

## 2021-06-18 NOTE — H&P
[facility] History and Physical       Name:  Manuela Cabrera  Age:80 y.o. Sex:male   :  1940    MRN:  [id number]   PCP:  Iris Fabry, MD      Admit Date:  2021  1:42 PM   Chief Complaint: generalized weakness with confusion    Reason for Admission:   Urinary tract infection [N39.0]  Severe sepsis ([address] [A41.9, R65.20]  Acute encephalopathy [G93.40]    Assessment & Plan:     Severe sepsis secondary to UTI based on UA:  Pt is s/p ileal conduit since  following radical cystoprostatectomy  Pt follows up with Dr. Juana Celis as outpatient  F/u with urine and blood culture  LA 2.7  S/p 2  ltrs bolus IVF in ER  Repeat LA 1.5  Given IV rocephin in ER. Continue IV rocephin daily for now until urine culture is back. Urology consult in AM  May need abdominal imaging once renal function is back to baseline. Acute kidney injury on CKD-2  Continue IV fluids and avoid nephrotoxic meds  Monitor daily BMP  Baseline creatinine 1.3-1.4, currently at 1.74    Acute encephalopathy: resolving  Secondary to UTI  Continue IV fluids and antibiotic  Avoid benzos and narcotics    HTN:  BP is optimally controlled  Restart losartan from tomorrow    Anxiety/depression:  Continue prozac 20 mg po daily    Dyslipidemia:  Continue lipitor    CAD s/p CABG:  Continue ASA and statin    PT/OT eval        Disposition/Expected LOS: 2-3 days  Diet: DIET ADULT  VTE ppx: lovenox  GI ppx: pepcid  Code status: DNR  Surrogate decision-maker: pt's son      History of Presenting Illness:     Manuela Cabrera is a 68 y.o. 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 Pt is alert, awake reports feeling somewhat better after IV fluids in ER.  He reports feeling weak, and having a fall as he felt extremely weak in his leg. He denies chest pain, SOB, cough, but does report mild abdominal discomfort in around ileal conduit site. Pt denies nausea/vomiting, fever, chills, diarrhea. ER work up remarkable for Cr 1.74, UA with 4+ bacteriuria, LA 2.7, A1c 6.0. CT head and CXR unremarkable. Review of Systems:  A 14 point review of systems was taken and pertinent positive as per HPI.         Past Medical History:   Diagnosis Date    Acute encephalopathy 6/18/2021    Arthritis     OA- bilat knees    Bladder absent     stoma present    Bladder cancer (HCC)     CAD (coronary artery disease)     CABG x 4 in 2004 - no damage- emergenc CABG- Cath 1/2011- total occlusion ramus, total mid occlusion left anterior descending, 50-70% stenosis of mid right coronary artery- \"Normal LEF\"    Cancer ([facility] [address] 1/1/2008    bladder- chemo in bladder then 7/11 bladder removed    CRI (chronic renal insufficiency), stage 3 (moderate) (HCC)     Depression     Family history of diabetes mellitus     Heart murmur     mild mitral regurgitation    Hypercholesteremia     Hypertension     Infection     to s/p hernia wound 6/5022    Other complication of colostomy or enterostomy     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    Followed by surgeon Dr Nilda Mosqueda intestinal obstruction     Urinary tract infection 6/18/2021    Ventral hernia, unspecified, without mention of obstruction or gangrene        Past Surgical History:   Procedure Laterality Date    HX COLONOSCOPY  1/07    repeat 10 years    HX HEART CATHETERIZATION  2011    HX HERNIA REPAIR  8/26/11    hernia repair    HX HERNIA REPAIR  9/2011    9 days later emergency s/p hernia repair surg    HX VASECTOMY  1974    [place] CARDIAC SURG PROCEDURE UNLIST  2004    CABG x 4    WI REMV BLADDER,TOTAL  7/2008    ostomy       Family History : reviewed  Family History   Problem Relation Age of Onset    Heart Disease Father 37        MI    Hypertension Father     Heart Disease Sister    Kacey Sink Sister     Cancer Mother         OVARY    Diabetes Sister     Hypertension Sister         Social History     Tobacco Use    Smoking status: Never Smoker    Smokeless tobacco: Never Used   Substance Use Topics    Alcohol use: No       No Known Allergies    Immunization History   Administered Date(s) Administered    Covid-19, MODERNA, Mrna, Lnp-s, Pf, 100mcg/0.5mL 03/01/2021, 04/01/2021    Influenza High Dose Vaccine PF 01/22/2019    Influenza Vaccine 09/17/2013, 09/26/2014    Influenza Vaccine PF 10/13/2015, 12/15/2016    Influenza Vaccine Split 10/16/2012    Influenza, Quadrivalent, Adjuvanted (>65 Yrs FLUAD QUAD 81385) 12/02/2020    Pneumococcal Conjugate (PCV-13) 09/17/2013, 04/09/2015    Pneumococcal Polysaccharide (PPSV-23) 02/18/2008    TDAP Vaccine 10/16/2012         PTA Medications:  Current Outpatient Medications   Medication Instructions    aspirin delayed-release 81 mg, Oral, DAILY    FLUoxetine (PROZAC) 20 mg, Oral, DAILY    losartan (COZAAR) 100 mg, Oral, DAILY    simvastatin (ZOCOR) 80 mg tablet TAKE 1 TABLET AT BEDTIME       Objective:     Patient Vitals for the past 24 hrs:   Temp Pulse Resp BP SpO2   06/18/21 1533     96 %   06/18/21 1445  72 19  99 %   06/18/21 1347 99.5 °F (37.5 °C) 84 18 135/67 96 %   06/18/21 1345  81  135/67 96 %       Oxygen Therapy  O2 Sat (%): 96 % (06/18/21 1533)  Pulse via Oximetry: 73 beats per minute (06/18/21 1445)  O2 Device: None (Room air) (06/18/21 1533)    Body mass index is 25.4 kg/m². Physical Exam:    General:  Alert, awake, elderly, lethargic, on RA, NAD  HEENT:  Head NCAT, PERRLA+   Neck:   Supple, no lymphadenopathy, no JVD   Lungs:  Clear to auscultation bilaterally   CV:   Regular rate and rhythm with normal S1 and S2   Abdomen:  Soft, nondistended, normoactive bowel sounds, ileal conduit site with bag filled with straw colored urine with mucus around the stoma, no bleeding noted.  Mild tenderness on palpation  Extremities:  No cyanosis clubbing or edema   Neuro:  gcs 15, no motor or sensory deficits, CN 2-12 intat  Psych:  AO x3, mood and affect appropriate      Data Reviewed: I have reviewed all labs, meds, and studies. Recent Results (from the past 24 hour(s))   LACTIC ACID    Collection Time: 06/18/21  1:55 PM   Result Value Ref Range    Lactic acid 2.7 (H) 0.4 - 2.0 MMOL/L   CBC WITH AUTOMATED DIFF    Collection Time: 06/18/21  1:55 PM   Result Value Ref Range    WBC 10.5 4.3 - 11.1 K/uL    RBC 4.02 (L) 4.23 - 5.6 M/uL    HGB 12.7 (L) 13.6 - 17.2 g/dL    HCT 38.0 (L) 41.1 - 50.3 %    MCV 94.5 79.6 - 97.8 FL    MCH 31.6 26.1 - 32.9 PG    MCHC 33.4 31.4 - 35.0 g/dL    RDW 13.6 11.9 - 14.6 %    PLATELET 073 (L) 361 - 450 K/uL    MPV 9.9 9.4 - 12.3 FL    ABSOLUTE NRBC 0.00 0.0 - 0.2 K/uL    DF AUTOMATED      NEUTROPHILS 83 (H) 43 - 78 %    LYMPHOCYTES 8 (L) 13 - 44 %    MONOCYTES 8 4.0 - 12.0 %    EOSINOPHILS 1 0.5 - 7.8 %    BASOPHILS 0 0.0 - 2.0 %    IMMATURE GRANULOCYTES 1 0.0 - 5.0 %    ABS. NEUTROPHILS 8.7 (H) 1.7 - 8.2 K/UL    ABS. LYMPHOCYTES 0.8 0.5 - 4.6 K/UL    ABS. MONOCYTES 0.8 0.1 - 1.3 K/UL    ABS. EOSINOPHILS 0.1 0.0 - 0.8 K/UL    ABS. BASOPHILS 0.0 0.0 - 0.2 K/UL    ABS. IMM. GRANS. 0.1 0.0 - 0.5 K/UL   METABOLIC PANEL, COMPREHENSIVE    Collection Time: 06/18/21  1:55 PM   Result Value Ref Range    Sodium 137 (L) 138 - 145 mmol/L    Potassium 4.0 3.5 - 5.1 mmol/L    Chloride 104 98 - 107 mmol/L    CO2 25 21 - 32 mmol/L    Anion gap 8 7 - 16 mmol/L    Glucose 130 (H) 65 - 100 mg/dL    BUN 24 (H) 8 - 23 MG/DL    Creatinine 1.74 (H) 0.8 - 1.5 MG/DL    GFR est AA 49 (L) >60 ml/min/1.73m2    GFR est non-AA 41 (L) >60 ml/min/1.73m2    Calcium 8.8 8.3 - 10.4 MG/DL    Bilirubin, total 0.9 0.2 - 1.1 MG/DL    ALT (SGPT) 23 12 - 65 U/L    AST (SGOT) 17 15 - 37 U/L    Alk.  phosphatase 45 (L) 50 - 136 U/L    Protein, total 7.4 6.3 - 8.2 g/dL    Albumin 3.5 3.2 - 4.6 g/dL    Globulin 3.9 (H) 2.3 - 3.5 g/dL    A-G Ratio 0.9 (L) 1.2 - 3.5     PROCALCITONIN    Collection Time: 06/18/21  1:55 PM   Result Value Ref Range    Procalcitonin 1.82 ng/mL   URINALYSIS W/ RFLX MICROSCOPIC    Collection Time: 06/18/21  2:04 PM   Result Value Ref Range    Color CRISTOPHER      Appearance TURBID      Specific gravity 1.016 1.001 - 1.023      pH (UA) 7.5 5.0 - 9.0      Protein 100 (A) NEG mg/dL    Glucose Negative mg/dL    Ketone TRACE (A) NEG mg/dL    Bilirubin Negative NEG      Blood MODERATE (A) NEG      Urobilinogen 0.2 0.2 - 1.0 EU/dL    Nitrites Negative NEG      Leukocyte Esterase LARGE (A) NEG      WBC 20-50 0 /hpf    RBC 0-3 0 /hpf    Bacteria 3+ (H) 0 /hpf    Amorphous Crystals TRACE 0      Other observations RESULTS VERIFIED MANUALLY     EKG, 12 LEAD, INITIAL    Collection Time: 06/18/21  2:04 PM   Result Value Ref Range    Ventricular Rate 78 BPM    Atrial Rate 78 BPM    P-R Interval 192 ms    QRS Duration 102 ms    Q-T Interval 366 ms    QTC Calculation (Bezet) 417 ms    Calculated P Axis 78 degrees    Calculated R Axis 58 degrees    Calculated T Axis 83 degrees    Diagnosis       !! AGE AND GENDER SPECIFIC ECG ANALYSIS !! Sinus rhythm with Premature atrial complexes  Incomplete right bundle branch block  Borderline ECG  When compared with ECG of 16-JAN-2011 09:18,  Premature atrial complexes are now Present  Vent. rate has increased BY  33 BPM         EKG Results     Procedure 720 Value Units Date/Time    EKG, 12 LEAD, INITIAL [[id number]] Collected: 06/18/21 1404    Order Status: Completed Updated: 06/18/21 1506     Ventricular Rate 78 BPM      Atrial Rate 78 BPM      P-R Interval 192 ms      QRS Duration 102 ms      Q-T Interval 366 ms      QTC Calculation (Bezet) 417 ms      Calculated P Axis 78 degrees      Calculated R Axis 58 degrees      Calculated T Axis 83 degrees      Diagnosis --     !! AGE AND GENDER SPECIFIC ECG ANALYSIS !!   Sinus rhythm with Premature atrial complexes  Incomplete right bundle branch block  Borderline ECG  When compared with ECG of 16-JAN-2011 09:18,  Premature atrial complexes are now Present  Vent. rate has increased BY  33 BPM            All Micro Results     Procedure Component Value Units Date/Time    BLOOD CULTURE [[id number]] Collected: 06/18/21 1355    Order Status: Completed Specimen: Blood Updated: 06/18/21 1506    CULTURE, URINE [[id number]] Collected: 06/18/21 1404    Order Status: Completed Specimen: Urine from Clean catch Updated: 06/18/21 1504    BLOOD CULTURE [[id number]]     Order Status: Sent Specimen: Blood           Other Studies:  CT HEAD WO CONT    Result Date: 6/18/2021  EXAMINATION: HEAD CT WITHOUT CONTRAST 6/18/2021 3:14 PM ACCESSION NUMBER: [id number] INDICATION: AMS/confuson; fever; recent fall severall days ago COMPARISON: None available TECHNIQUE: Multiple-row detector helical CT examination of the head without intravenous contrast. Radiation dose reduction techniques were used for this study:  Our CT scanners use one or all of the following: Automated exposure control, adjustment of the mA and/or kVp according to patient's size, iterative reconstruction. FINDINGS: The ventricles are within normal limits for the degree of global brain parenchymal volume loss. There is no midline shift. The basilar cisterns are patent. There is no cerebellar tonsillar ectopia or herniation. Hypodensities in the periventricular and subcortical white matter are nonspecific, but they are most likely to represent chronic microangiopathy. There is no evidence of acute intracranial hemorrhage or extra-axial collections. There is no CT evidence of acute infarction. Small left maxillary sinus mucus retention cyst. No evidence of skull fracture. No acute intracranial abnormality.  VOICE DICTATED BY: Dr. Griffin Tam     XR CHEST PORT    Result Date: 6/18/2021  EXAMINATION: CHEST RADIOGRAPH 6/18/2021 1:59 PM ACCESSION NUMBER: [id number] INDICATION: meets SIRS criteria COMPARISON: Chest x-ray 1/16/2011, 8/7/2008 TECHNIQUE: A single AP view of the chest was obtained. FINDINGS: Support Lines and Tubes: None Cardiac Silhouette: Prior median sternotomy. There is unchanged mild enlargement of the cardiac silhouette. Lungs: No focal airspace disease. Pleura: No pleural effusion. No pneumothorax. Osseous Structures: Unremarkable. Upper Abdomen: Unremarkable. 1. Unchanged mild enlargement of the cardiac silhouette. 2. No focal airspace disease.  VOICE DICTATED BY: Dr. Juan Parnell        Medications:  Medications Administered       acetaminophen (TYLENOL) tablet 650 mg       Admin Date  03/13/2021 Action  Given Dose  650 mg Route  Oral Administered By  Osmar Aviles RN              cefTRIAXone (ROCEPHIN) 1 g in 0.9% sodium chloride (MBP/ADV) 50 mL MBP       Admin Date  03/13/2021 Action  New Bag Dose  1 g Rate  100 mL/hr Route  IntraVENous Administered By  Will Khan RN              cefTRIAXone (ROCEPHIN) 2 g in 0.9% sodium chloride (MBP/ADV) 50 mL MBP       Admin Date  03/13/2021 Action  New Bag Dose  2 g Rate  100 mL/hr Route  IntraVENous Administered By  Noel Bryant RN               Admin Date  03/14/2021 Action  New Bag Dose  2 g Rate  100 mL/hr Route  IntraVENous Administered By  Sharron Merida RN               Admin Date  03/15/2021 Action  New Bag Dose  2 g Rate  100 mL/hr Route  IntraVENous Administered By  Sharron Merida RN               Admin Date  03/16/2021 Action  New Bag Dose  2 g Rate  100 mL/hr Route  IntraVENous Administered By  Lalita Chicas              diphenhydrAMINE (BENADRYL) injection 25 mg       Admin Date  03/14/2021 Action  Given Dose  25 mg Route  IntraVENous Administered By  Mariah Medina RN              doxycycline (VIBRAMYCIN) 100 mg in 0.9% sodium chloride (MBP/ADV) 100 mL MBP       Admin Date  03/13/2021 Action  New Bag Dose  100 mg Rate  100 mL/hr Route  IntraVENous Administered By  Noel Bryant RN               Admin Date  03/13/2021 Action  New Bag Dose  100 mg Rate  100 mL/hr Route  IntraVENous Administered By  Evelin Daigle RN               Admin Date  03/14/2021 Action  New Bag Dose  100 mg Rate  100 mL/hr Route  IntraVENous Administered By  Blake Huang RN               Admin Date  03/14/2021 Action  New Bag Dose  100 mg Rate  100 mL/hr Route  IntraVENous Administered By  Evelin Daigle RN               Admin Date  03/15/2021 Action  New Bag Dose  100 mg Rate  100 mL/hr Route  IntraVENous Administered By  Blake Huang RN              enoxaparin (LOVENOX) injection 40 mg       Admin Date  03/13/2021 Action  Given Dose  40 mg Route  SubCUTAneous Administered By  Sanju Collier RN               Admin Date  03/13/2021 Action  Given Dose  40 mg Route  SubCUTAneous Administered By  Evelin Daigle RN               Admin Date  03/14/2021 Action  Given Dose  40 mg Route  SubCUTAneous Administered By  Blake Huang RN               Admin Date  03/14/2021 Action  Given Dose  40 mg Route  SubCUTAneous Administered By  Evelin Daigle RN               Admin Date  03/15/2021 Action  Given Dose  40 mg Route  SubCUTAneous Administered By  Blake Huang RN               Admin Date  03/15/2021 Action  Given Dose  40 mg Route  SubCUTAneous Administered By  Yen Mchugh RN               Admin Date  03/16/2021 Action  Given Dose  40 mg Route  SubCUTAneous Administered By  Mikey Hartman              fentaNYL citrate (PF) injection 50 mcg       Admin Date  03/13/2021 Action  Given Dose  50 mcg Route  IntraVENous Administered By  Sanju Collier RN               Admin Date  03/14/2021 Action  Given Dose  50 mcg Route  IntraVENous Administered By  Evelin Daigle RN              ferrous sulfate tablet 325 mg       Admin Date  03/14/2021 Action  Given Dose  325 mg Route  Oral Administered By  Blake Huang RN               Admin Date  03/14/2021 Action  Given Dose  325 mg Route  Oral Administered By  Blake Huang RN               Admin Date  03/15/2021 Action  Given Dose  325 mg Route  Oral Administered By  Chilo Guzman RN              folic acid (FOLVITE) tablet 1 mg       Admin Date  03/14/2021 Action  Given Dose  1 mg Route  Oral Administered By  Chilo Guzman RN               Admin Date  03/15/2021 Action  Given Dose  1 mg Route  Oral Administered By  Chilo Guzman RN               Admin Date  03/16/2021 Action  Given Dose  1 mg Route  Oral Administered By  Zi Landrum              furosemide (LASIX) injection 20 mg       Admin Date  03/15/2021 Action  Given Dose  20 mg Route  IntraVENous Administered By  Chilo Guzman RN              furosemide (LASIX) injection 40 mg       Admin Date  03/13/2021 Action  Given Dose  40 mg Route  IntraVENous Administered By  Augustus Garber RN               Admin Date  03/13/2021 Action  Given Dose  40 mg Route  IntraVENous Administered By  Gerard Martínez RN               Admin Date  03/14/2021 Action  Given Dose  40 mg Route  IntraVENous Administered By  Chilo Guzman RN               Admin Date  03/14/2021 Action  Given Dose  40 mg Route  IntraVENous Administered By  Gerard Martínez RN               Admin Date  03/15/2021 Action  Given Dose  40 mg Route  IntraVENous Administered By  Rush Biggs RN               Admin Date  03/16/2021 Action  Given Dose  40 mg Route  IntraVENous Administered By  Zi Landrum              HYDROcodone-acetaminophen [facility]) 5-325 mg per tablet 1 Tab       Admin Date  03/14/2021 Action  Given Dose  1 Tab Route  Oral Administered By  Chilo Guzman RN               Admin Date  03/15/2021 Action  Given Dose  1 Tab Route  Oral Administered By  Gerard Martínez RN              insulin lispro (HUMALOG) injection       Admin Date  03/13/2021 Action  Given Dose  2 Units Route  SubCUTAneous Administered By  Gerard Martínez RN               Admin Date  03/14/2021 Action  Given Dose  2 Units Route  SubCUTAneous Administered By  Chilo Guzman RN               Admin Date  03/14/2021 Action  Given Dose  2 Units Route  SubCUTAneous Administered By  Chilo Guzman RN               Admin Date  03/14/2021 Action  Given Dose  4 Units Route  SubCUTAneous Administered By  Gerard Martínez RN               Admin Date  03/15/2021 Action  Given Dose  2 Units Route  SubCUTAneous Administered By  Chilo Guzman RN               Admin Date  03/15/2021 Action  Given Dose  4 Units Route  SubCUTAneous Administered By  Chilo Guzman RN               Admin Date  03/15/2021 Action  Given Dose  2 Units Route  SubCUTAneous Administered By  Rush Biggs RN               Admin Date  03/16/2021 Action  Given Dose  4 Units Route  SubCUTAneous Administered By  Zi Landrum              levothyroxine (SYNTHROID) tablet 137 mcg       Admin Date  03/14/2021 Action  Given Dose  137 mcg Route  Oral Administered By  Chilo Guzman RN               Admin Date  03/15/2021 Action  Given Dose  137 mcg Route  Oral Administered By  Chilo Guzman RN               Admin Date  03/16/2021 Action  Given Dose  137 mcg Route  Oral Administered By  Rush Biggs RN              midodrine (PROAMATINE) tablet 10 mg       Admin Date  03/14/2021 Action  Given Dose  10 mg Route  Oral Administered By  Chilo Guzman RN               Admin Date  03/14/2021 Action  Given Dose  10 mg Route  Oral Administered By  Chilo Guzman RN               Admin Date  03/15/2021 Action  Given Dose  10 mg Route  Oral Administered By  Chilo Guzman RN               Admin Date  03/15/2021 Action  Given Dose  10 mg Route  Oral Administered By  Chilo Guzman RN               Admin Date  03/15/2021 Action  Given Dose  10 mg Route  Oral Administered By  Chilo Guzman RN               Admin Date  03/16/2021 Action  Given Dose  10 mg Route  Oral Administered By  Gabriela Jacome Date  03/16/2021 Action  Given Dose  10 mg Route  Oral Administered By  Gabriela Jacome Date  03/16/2021 Action  Given Dose  10 mg Route  Oral Administered By  Naveed hebert [facility]) injection 2 mg       Admin Date  03/13/2021 Action  Given Dose  2 mg Route  IntraVENous Administered By  Jada Hyman RN              NOREPINephrine (LEVOPHED) 4 mg in 5% dextrose 250 mL infusion       Admin Date  03/14/2021 Action  New Bag Dose  4 mcg/min Rate  15 mL/hr Route  IntraVENous Administered By  Emma Mckinley RN               Admin Date  03/14/2021 Action  Rate Change Dose  6 mcg/min Rate  22.5 mL/hr Route  IntraVENous Administered By  Emma Mckinley RN               Admin Date  03/14/2021 Action  Rate Change Dose  4 mcg/min Rate  15 mL/hr Route  IntraVENous Administered By  Emma Mckinley RN               Admin Date  03/14/2021 Action  Rate Change Dose  2 mcg/min Rate  7.5 mL/hr Route  IntraVENous Administered By  Emma Mckinley RN               Admin Date  03/14/2021 Action  Rate Change Dose  1 mcg/min Rate  3.8 mL/hr Route  IntraVENous Administered By  Emma Mckinley RN               Admin Date  03/14/2021 Action  Restarted Dose  2 mcg/min Rate  7.5 mL/hr Route  IntraVENous Administered By  Emma Mckinley RN               Admin Date  03/14/2021 Action  Rate Change Dose  4 mcg/min Rate  15 mL/hr Route  IntraVENous Administered By  Emma Mckinley RN               Admin Date  03/15/2021 Action  Rate Change Dose  2 mcg/min Rate  7.5 mL/hr Route  IntraVENous Administered By  Anel Barnes RN              ondansetron [facility]) injection 4 mg       Admin Date  03/13/2021 Action  Given Dose  4 mg Route  IntraVENous Administered By  Jada Hyman RN              pantoprazole (PROTONIX) tablet 40 mg       Admin Date  03/14/2021 Action  Given Dose  40 mg Route  Oral Administered By  Emma Mckinley RN               Admin Date  03/15/2021 Action  Given Dose  40 mg Route  Oral Administered By  Emma Mckinley RN               Admin Date  03/16/2021 Action  Given Dose  40 mg Route  Oral Administered By  Zi Landrum              perflutren lipid microspheres (DEFINITY) in NS bolus IV       Admin Date  03/13/2021 Action  Given Dose  1 mL Route  IntraVENous Administered By  CAROLINA Nick              potassium chloride (K-DUR, KLOR-CON) SR tablet 40 mEq       Admin Date  03/15/2021 Action  Given Dose  40 mEq Route  Oral Administered By  Chilo Guzman RN               Admin Date  03/16/2021 Action  Given Dose  40 mEq Route  Oral Administered By  Zi Landrum              potassium chloride 40 mEq IVPB       Admin Date  03/15/2021 Action  New Bag Dose  40 mEq Rate  25 mL/hr Route  IntraVENous Administered By  Gerard Martínez RN              pregabalin (LYRICA) capsule 300 mg       Admin Date  03/14/2021 Action  Given Dose  300 mg Route  Oral Administered By  Chilo Guzman RN               Admin Date  03/14/2021 Action  Given Dose  300 mg Route  Oral Administered By  Chilo Guzman RN               Admin Date  03/15/2021 Action  Given Dose  300 mg Route  Oral Administered By  Chilo Guzman RN               Admin Date  03/15/2021 Action  Given Dose  300 mg Route  Oral Administered By  Krista Thornton RN               Admin Date  03/16/2021 Action  Given Dose  300 mg Route  Oral Administered By  Zi Landrum              sertraline (ZOLOFT) tablet 300 mg       Admin Date  03/14/2021 Action  Given Dose  300 mg Route  Oral Administered By  Chilo Guzman RN               Admin Date  03/15/2021 Action  Given Dose  300 mg Route  Oral Administered By  Chilo Guzman RN               Admin Date  03/16/2021 Action  Given Dose  300 mg Route  Oral Administered By  Zi Landrum              sodium chloride (NS) flush 5-40 mL       Admin Date  03/13/2021 Action  Given Dose  10 mL Route  IntraVENous Administered By  Sheryle Masse, RN               Admin Date  03/13/2021 Action  Given Dose  30 mL Route  IntraVENous Administered By  Sheryle Masse, RN               Admin Date  03/13/2021 Action  Given Dose  40 mL Route  IntraVENous Administered By  Kin Mayes RN               Admin Date  03/14/2021 Action  Given Dose  40 mL Route  IntraVENous Administered By  Kin Mayes RN               Admin Date  03/14/2021 Action  Given Dose  10 mL Route  IntraVENous Administered By  Will Brown RN               Admin Date  03/14/2021 Action  Given Dose  40 mL Route  IntraVENous Administered By  Kin Mayes RN               Admin Date  03/15/2021 Action  Given Dose  40 mL Route  IntraVENous Administered By  Kin Mayes RN               Admin Date  03/15/2021 Action  Given Dose  10 mL Route  IntraVENous Administered By  Will Brown RN               Admin Date  03/15/2021 Action  Given Dose  10 mL Route  IntraVENous Administered By  Vin Gutierrez, JOSHUA               Admin Date  03/16/2021 Action  Given Dose  10 mL Route  IntraVENous Administered By  Karlee Jama              sodium chloride 0.9 % bolus infusion 250 mL       Admin Date  03/14/2021 Action  New Bag Dose  250 mL Rate  250 mL/hr Route  IntraVENous Administered By  Will Brown RN              tiZANidine (ZANAFLEX) tablet 4 mg       Admin Date  03/14/2021 Action  Given Dose  4 mg Route  Oral Administered By  Kin Mayes RN               Admin Date  03/14/2021 Action  Given Dose  4 mg Route  Oral Administered By  Will Brown RN               Admin Date  03/14/2021 Action  Given Dose  4 mg Route  Oral Administered By  Will Brown RN               Admin Date  03/14/2021 Action  Given Dose  4 mg Route  Oral Administered By  Kin Mayes RN               Admin Date  03/15/2021 Action  Given Dose  4 mg Route  Oral Administered By  Will Brown RN               Admin Date  03/15/2021 Action  Given Dose  4 mg Route  Oral Administered By  Will Brown RN               Admin Date  03/15/2021 Action  Given Dose  4 mg Route  Oral Administered By  Vin Gutierrez RN               Admin Date  03/16/2021 Action  Given Dose  4 mg Route  Oral Administered By  Marko Salinas Date  03/16/2021 Action  Given Dose  4 mg Route  Oral Administered By  Beltran Hidalgo              traZODone (DESYREL) tablet 150 mg       Admin Date  03/14/2021 Action  Given Dose  150 mg Route  Oral Administered By  Mouna Wright RN               Admin Date  03/15/2021 Action  Given Dose  150 mg Route  Oral Administered By  Dorinda Bland RN              tuberculin injection 5 Units       Admin Date  03/15/2021 Action  Give PPD Dose  5 Units Route  IntraDERMal Administered By  Quin Hirsch RN              vancomycin (VANCOCIN) 2500 mg in  mL infusion       Admin Date  03/13/2021 Action  Given Dose  2,500 mg Rate  200 mL/hr Route  IntraVENous Administered By  Leonid Mondragon RN              zonisamide (ZONEGRAN) capsule 300 mg       Admin Date  03/14/2021 Action  Given Dose  300 mg Route  Oral Administered By  Mouna Wright RN               Admin Date  03/15/2021 Action  Given Dose  300 mg Route  Oral Administered By  Dorinda Bland RN                        Problem List:     Hospital Problems as of 6/18/2021 Date Reviewed: 5/20/2021        Codes Class Noted - Resolved POA    * (Principal) Severe sepsis ([facility] ICD-10-CM: A41.9, R65.20  ICD-9-CM: 038.9, 995.92  6/18/2021 - Present Unknown        Acute encephalopathy ICD-10-CM: G93.40  ICD-9-CM: 348.30  6/18/2021 - Present Unknown        Urinary tract infection ICD-10-CM: N39.0  ICD-9-CM: 599.0  6/18/2021 - Present Unknown        Stage 3a chronic kidney disease ([facility] ICD-10-CM: N18.31  ICD-9-CM: 815. 3  Unknown - Present Yes        Major depressive disorder with single episode, in full remission ([facility] ICD-10-CM: F32.5  ICD-9-CM: 296.26  Unknown - Present Yes        Essential hypertension ICD-10-CM: I10  ICD-9-CM: 401.9  7/31/2017 - Present Yes        Mixed hyperlipidemia ICD-10-CM: E78.2  ICD-9-CM: 272.2  7/31/2017 - Present Yes        Coronary artery disease involving coronary bypass graft of native heart without angina pectoris ICD-10-CM: I25.810  ICD-9-CM: 414.05  11/6/2015 - Present Yes                 Signed By: Bony Jaime MD   [facility] Hospitalist Service    June 18, 2021  4:22 PM

## 2021-06-18 NOTE — ED TRIAGE NOTES
Pt arrives via EMS from home. Increased confusion for 4 days. Foul smelling dark urine. Temp of 100.1 with EMS. Pt states some increased pain when coughing to colostomy site as well. Did have a fall Wednesday but did not hit head and blood thinners. NAD. Quinn. Pankaj Melendez

## 2021-06-18 NOTE — PROGRESS NOTES
New Urology consult placed with Urologist Dr. Balwinder Carlos. Reason for consult discussed and he reports he will see the pt.

## 2021-06-18 NOTE — PROGRESS NOTES
TRANSFER - IN REPORT:    Verbal report received from [facility] RN(name) on Dylan Valdes  being received from ED(unit) for routine progression of care      Report consisted of patients Situation, Background, Assessment and   Recommendations(SBAR). Information from the following report(s) SBAR was reviewed with the receiving nurse. Opportunity for questions and clarification was provided. Assessment completed upon patients arrival to unit and care assumed.

## 2021-06-18 NOTE — PROGRESS NOTES
Chart review complete, pt pending completed evaluation from ED MD, pt confirmed with PCP Dr Yohana Pack last visit with MD was 5/20/21. CM will remain available to assist as needed.

## 2021-06-18 NOTE — ED NOTES
TRANSFER - OUT REPORT:    Verbal report given to Al, RN on [employer]  being transferred to 7th floor for routine progression of care       Report consisted of patients Situation, Background, Assessment and   Recommendations(SBAR). Information from the following report(s) SBAR, ED Summary and MAR was reviewed with the receiving nurse. Lines:   Peripheral IV 06/18/21 Distal;Right (Active)       Peripheral IV 06/18/21 Left Antecubital (Active)   Site Assessment Clean, dry, & intact 06/18/21 1626   Phlebitis Assessment 0 06/18/21 1626   Infiltration Assessment 0 06/18/21 1626   Dressing Status Clean, dry, & intact 06/18/21 1626   Hub Color/Line Status Pink 06/18/21 1626        Opportunity for questions and clarification was provided.

## 2021-06-19 ENCOUNTER — APPOINTMENT (OUTPATIENT)
Dept: CT IMAGING | Age: 81
DRG: 871 | End: 2021-06-19
Attending: UROLOGY
Payer: MEDICARE

## 2021-06-19 LAB
ALBUMIN SERPL-MCNC: 2.6 G/DL (ref 3.2–4.6)
ALBUMIN/GLOB SERPL: 0.8 {RATIO} (ref 1.2–3.5)
ALP SERPL-CCNC: 37 U/L (ref 50–136)
ALT SERPL-CCNC: 20 U/L (ref 12–65)
ANION GAP SERPL CALC-SCNC: 6 MMOL/L (ref 7–16)
APTT PPP: 34.6 SEC (ref 24.1–35.1)
AST SERPL-CCNC: 20 U/L (ref 15–37)
BILIRUB SERPL-MCNC: 0.6 MG/DL (ref 0.2–1.1)
BUN SERPL-MCNC: 24 MG/DL (ref 8–23)
CALCIUM SERPL-MCNC: 7.5 MG/DL (ref 8.3–10.4)
CHLORIDE SERPL-SCNC: 112 MMOL/L (ref 98–107)
CO2 SERPL-SCNC: 22 MMOL/L (ref 21–32)
CREAT SERPL-MCNC: 1.42 MG/DL (ref 0.8–1.5)
ERYTHROCYTE [DISTWIDTH] IN BLOOD BY AUTOMATED COUNT: 13.7 % (ref 11.9–14.6)
GLOBULIN SER CALC-MCNC: 3.4 G/DL (ref 2.3–3.5)
GLUCOSE SERPL-MCNC: 101 MG/DL (ref 65–100)
HCT VFR BLD AUTO: 31.5 % (ref 41.1–50.3)
HGB BLD-MCNC: 10.4 G/DL (ref 13.6–17.2)
INR PPP: 1.1
MAGNESIUM SERPL-MCNC: 1.8 MG/DL (ref 1.8–2.4)
MCH RBC QN AUTO: 31.4 PG (ref 26.1–32.9)
MCHC RBC AUTO-ENTMCNC: 33 G/DL (ref 31.4–35)
MCV RBC AUTO: 95.2 FL (ref 79.6–97.8)
NRBC # BLD: 0 K/UL (ref 0–0.2)
PLATELET # BLD AUTO: 91 K/UL (ref 150–450)
PMV BLD AUTO: 10.2 FL (ref 9.4–12.3)
POTASSIUM SERPL-SCNC: 3.6 MMOL/L (ref 3.5–5.1)
PROT SERPL-MCNC: 6 G/DL (ref 6.3–8.2)
PROTHROMBIN TIME: 14.7 SEC (ref 12.5–14.7)
RBC # BLD AUTO: 3.31 M/UL (ref 4.23–5.6)
SODIUM SERPL-SCNC: 140 MMOL/L (ref 138–145)
WBC # BLD AUTO: 5.5 K/UL (ref 4.3–11.1)

## 2021-06-19 PROCEDURE — 83735 ASSAY OF MAGNESIUM: CPT

## 2021-06-19 PROCEDURE — 80053 COMPREHEN METABOLIC PANEL: CPT

## 2021-06-19 PROCEDURE — 2709999900 HC NON-CHARGEABLE SUPPLY

## 2021-06-19 PROCEDURE — 85027 COMPLETE CBC AUTOMATED: CPT

## 2021-06-19 PROCEDURE — 85610 PROTHROMBIN TIME: CPT

## 2021-06-19 PROCEDURE — 74011000258 HC RX REV CODE- 258: Performed by: HOSPITALIST

## 2021-06-19 PROCEDURE — 65270000029 HC RM PRIVATE

## 2021-06-19 PROCEDURE — 74011250637 HC RX REV CODE- 250/637: Performed by: HOSPITALIST

## 2021-06-19 PROCEDURE — 97535 SELF CARE MNGMENT TRAINING: CPT

## 2021-06-19 PROCEDURE — 97530 THERAPEUTIC ACTIVITIES: CPT

## 2021-06-19 PROCEDURE — 77030040361 HC SLV COMPR DVT MDII -B

## 2021-06-19 PROCEDURE — 85730 THROMBOPLASTIN TIME PARTIAL: CPT

## 2021-06-19 PROCEDURE — 97161 PT EVAL LOW COMPLEX 20 MIN: CPT

## 2021-06-19 PROCEDURE — 97165 OT EVAL LOW COMPLEX 30 MIN: CPT

## 2021-06-19 PROCEDURE — 74176 CT ABD & PELVIS W/O CONTRAST: CPT

## 2021-06-19 PROCEDURE — 36415 COLL VENOUS BLD VENIPUNCTURE: CPT

## 2021-06-19 PROCEDURE — 74011250636 HC RX REV CODE- 250/636: Performed by: HOSPITALIST

## 2021-06-19 PROCEDURE — 99222 1ST HOSP IP/OBS MODERATE 55: CPT | Performed by: UROLOGY

## 2021-06-19 RX ORDER — LOSARTAN POTASSIUM 50 MG/1
50 TABLET ORAL DAILY
Status: DISCONTINUED | OUTPATIENT
Start: 2021-06-19 | End: 2021-06-22 | Stop reason: HOSPADM

## 2021-06-19 RX ORDER — FAMOTIDINE 20 MG/1
20 TABLET, FILM COATED ORAL DAILY
Status: DISCONTINUED | OUTPATIENT
Start: 2021-06-20 | End: 2021-06-22 | Stop reason: HOSPADM

## 2021-06-19 RX ADMIN — Medication 10 ML: at 14:52

## 2021-06-19 RX ADMIN — CEFTRIAXONE 2 G: 2 INJECTION, POWDER, FOR SOLUTION INTRAMUSCULAR; INTRAVENOUS at 14:52

## 2021-06-19 RX ADMIN — Medication 10 ML: at 06:15

## 2021-06-19 RX ADMIN — FLUOXETINE 20 MG: 10 CAPSULE ORAL at 08:34

## 2021-06-19 RX ADMIN — SODIUM CHLORIDE 75 ML/HR: 900 INJECTION, SOLUTION INTRAVENOUS at 08:36

## 2021-06-19 RX ADMIN — ACETAMINOPHEN 650 MG: 325 TABLET ORAL at 08:34

## 2021-06-19 RX ADMIN — ASPIRIN 81 MG: 81 TABLET ORAL at 08:34

## 2021-06-19 RX ADMIN — LOSARTAN POTASSIUM 50 MG: 50 TABLET, FILM COATED ORAL at 08:34

## 2021-06-19 RX ADMIN — FAMOTIDINE 20 MG: 20 TABLET ORAL at 08:35

## 2021-06-19 RX ADMIN — Medication 10 ML: at 06:16

## 2021-06-19 RX ADMIN — Medication 10 ML: at 21:45

## 2021-06-19 NOTE — ACP (ADVANCE CARE PLANNING)
Advance care planninginitial encounter      Face to face time - 20 minutes face-to-face time spent discussion the care       Patient is able to make his/her own decisions:  [X] Yes  [ ] NO    Names of POA/surrogate decision maker when patient is altered  Lois Price     Other members present in the meeting : none    Patient / surrogate decision-maker ultimately chose. .. [] Full code- full aggressive medical and surgical interventions, including intubations, resuscitations, pressors, artificial tube feeding  [ ] DO NOT RESUSCITATE -okay to intubate,  and other aggressive medical and surgical intervention   Alber ] Not resuscitate, DO NOT INTUBATE, but okay for other aggressive medical and surgical intervention   [ ] DNR/DNI, hospice, comfort focus care to maximize patient's quality of life  [ ] DNR/DNI, strict comfort care ONLY        Further discussion regarding principle disease process.  prognosis, plans of care, and treatment goals

## 2021-06-19 NOTE — PROGRESS NOTES
ACUTE OT GOALS:  (Developed with and agreed upon by patient and/or caregiver.)  1. Patient will complete lower body bathing and dressing with supervision and adaptive equipment as needed. 2. Patient will complete toileting with supervision . 3. Patient will complete grooming ADL with supervision  . 4. Patient will tolerate 23 minutes of OT treatment with 1-2 rest breaks to increase activity tolerance for ADLs. 5. Patient will complete functional transfers with supervision   and adaptive equipment as needed. 6. Patient will tolerate 10 minutes BUE exercises to increase strength for safe, functional transfers.      Timeframe: 7 visits     OCCUPATIONAL THERAPY ASSESSMENT: Initial Assessment and Daily Note OT Treatment Day # 1    Rachana Zamorano is a 68 y.o. male   PRIMARY DIAGNOSIS: Severe sepsis ([facility]  Urinary tract infection [N39.0]  Severe sepsis ([facility] [A41.9, R65.20]  Acute encephalopathy [G93.40]       Reason for Referral:    ICD-10: Treatment Diagnosis: Generalized Muscle Weakness (M62.81)  Other lack of cordination (R27.8)  Difficulty in walking, Not elsewhere classified (R26.2)  [place]PAT[place] / [place] / Product Type: Medicare /   ASSESSMENT:     REHAB RECOMMENDATIONS:   Recommendation to date pending progress:  Setting:   STR vs. Bob Lemming  pending slight confusion  Equipment:    None (pt has all needs at home RW, TTB, SPC and power scooter)     PRIOR LEVEL OF FUNCTION:  (Prior to Hospitalization)  INITIAL/CURRENT LEVEL OF FUNCTION:  (Based on today's evaluation)   Bathing:   Modified Independent  Dressing:   Independent  Feeding/Grooming:   Independent  Toileting:   Independent  Functional Mobility:   Modified Independent Bathing:   Contact Guard Assistance  Dressing:   Contact Guard Assistance  Feeding/Grooming:   Contact Guard Assistance  Toileting:   Contact Guard Assistance  Functional Mobility:   Contact Guard Assistance     ASSESSMENT:  Mr. Lorene Aviles is a 67 y/o male presents with sepsis and UTI. Pt is AAO x4 but presents with slow speaking and delayed responses to PLOF questions asked. Pt lives alone in a one level home with a tub shower w/ TTB. Pt uses a RW for ambulation and power scooter for long distances. Pt endorses a few falls recently but unable to give a number. Pt with decreased functional ADLs, mobility and strength. Pt overall CGA for functional mobility, transfers and ADLs today with inc time and cues for safety awareness with RW when ambulating household distances, pt directing walker towards one side of hallway and required verbal cues to correct. Pt is currently functioning below baseline and would benefit from skilled OT services to address deficits and goals. Rec STR vs. HHOT at d/c secondary to pt living alone and with poor safety awareness at this time. SUBJECTIVE:   Mr. Gilberto Saleh states, \"I have a bench in my tub. \"    SOCIAL HISTORY/LIVING ENVIRONMENT: Pt lives alone in a one level home with a tub shower w/ TTB. Pt uses a RW for ambulation.         OBJECTIVE:     PAIN: VITAL SIGNS: LINES/DRAINS:   Pre Treatment: Pain Screen  Pain Scale 1: Numeric (0 - 10)  Pain Intensity 1: 0  Post Treatment: 0   IV  O2 Device: None (Room air)     GROSS EVALUATION:  BUE Within Functional Limits Abnormal/ Functional Abnormal/ Non-Functional (see comments) Not Tested Comments:   AROM [x] [] [] []    PROM [] [] [] []    Strength [x] [] [] []    Balance [] [x] [] [] CGA RW   Posture [] [x] [] [] Forward leaning with RW   Sensation [x] [] [] []    Coordination [x] [] [] []    Tone [] [] [] []    Edema [] [] [] []    Activity Tolerance [x] [] [] []     [] [] [] []      COGNITION/  PERCEPTION: Intact Impaired   (see comments) Comments:   Orientation [x] [] AAO x4, seemed confused and really had to think about PLOF questions asked   Vision [x] []    Hearing [x] []    Judgment/ Insight [] [x] Poor safety awareness when amb   Attention [x] []    Memory [x] [] Delayed responses   Command Following [x] []    Emotional Regulation [x] []     [] []      ACTIVITIES OF DAILY LIVING: I Mod I S SBA CGA Min Mod Max Total NT Comments   BASIC ADLs:              Bathing/ Showering [] [] [] [] [] [] [] [] [] []    Toileting [] [] [] [] [] [] [] [] [] []    Dressing [] [] [] [x] [] [] [] [] [] [] Donning slippers seated EOB   Feeding [] [] [] [] [] [] [] [] [] []    Grooming [] [] [] [] [] [] [] [] [] []    Personal Device Care [] [] [] [] [] [] [] [] [] []    Functional Mobility [] [] [] [] [x] [] [] [] [] [] RW and cues for direction and RW management   I=Independent, Mod I=Modified Independent, S=Supervision, SBA=Standby Assistance, CGA=Contact Guard Assistance,   Min=Minimal Assistance, Mod=Moderate Assistance, Max=Maximal Assistance, Total=Total Assistance, NT=Not Tested    MOBILITY: I Mod I S SBA CGA Min Mod Max Total  NT x2 Comments:   Supine to sit [] [] [] [] [x] [] [] [] [] [] []    Sit to supine [] [] [] [] [] [] [] [] [] [] []    Sit to stand [] [] [] [] [x] [] [] [] [] [] [] RW   Bed to chair [] [] [] [] [x] [] [] [] [] [] [] RW   I=Independent, Mod I=Modified Independent, S=Supervision, SBA=Standby Assistance, CGA=Contact Guard Assistance,   Min=Minimal Assistance, Mod=Moderate Assistance, Max=Maximal Assistance, Total=Total Assistance, NT=Not Tested    [facility] AM-PAC 6 Clicks   Daily Activity Inpatient Short Form        How much help from another person does the patient currently need. .. Total A Lot A Little None   1. Putting on and taking off regular lower body clothing? [] 1   [] 2   [x] 3   [] 4   2. Bathing (including washing, rinsing, drying)? [] 1   [] 2   [x] 3   [] 4   3. Toileting, which includes using toilet, bedpan or urinal?   [] 1   [] 2   [x] 3   [] 4   4. Putting on and taking off regular upper body clothing? [] 1   [] 2   [] 3   [x] 4   5. Taking care of personal grooming such as brushing teeth? [] 1   [] 2   [] 3   [x] 4   6. Eating meals? [] 1   [] 2   [] 3   [x] 4   © 2007, Trustees of [address] [place] [address] [address] [id number], under license to [employer]. All rights reserved     Score:  Initial: 21 Most Recent: X (Date: -- )   Interpretation of Tool:  Represents activities that are increasingly more difficult (i.e. Bed mobility, Transfers, Gait). PLAN:   FREQUENCY/DURATION: OT Plan of Care: 3 times/week for duration of hospital stay or until stated goals are met, whichever comes first.    PROBLEM LIST:   (Skilled intervention is medically necessary to address:)  1. Decreased ADL/Functional Activities  2. Decreased Activity Tolerance  3. Decreased Balance  4. Decreased Cognition  5. Decreased Gait Ability  6. Decreased Strength  7. Decreased Transfer Abilities   INTERVENTIONS PLANNED:   (Benefits and precautions of occupational therapy have been discussed with the patient.)  1. Self Care Training  2. Therapeutic Activity  3. Therapeutic Exercise/HEP  4. Neuromuscular Re-education  5. Education     TREATMENT:     EVALUATION: Low Complexity : (Untimed Charge)    TREATMENT:   ($$ Self Care/Home Management: 8-22 mins    )  Self Care (8 Minutes): Self care including Lower Body Dressing, ADL Adaptive Equipment Training and ambulating household distances to increase independence and decrease level of assistance required.     TREATMENT GRID:  N/A    AFTER TREATMENT POSITION/PRECAUTIONS:  Pt on stretcher with transport    INTERDISCIPLINARY COLLABORATION:  RN/PCT, PT/PTA and OT/ERIC    TOTAL TREATMENT DURATION:  OT Patient Time In/Time Out  Time In: 1025  Time Out: [address], OT

## 2021-06-19 NOTE — PROGRESS NOTES
Urology Update Note:     CT shows severe L hydro-ureteronephrosis with L distal ureteral stones causing obstruction. IMPRESSION  Moderately severe left hydroureteronephrosis with multiple, (at  least 5) small, 2-5 mm stones in tandem in the lower left ureter. Resting in the Left ureter, status post cystectomy with a parastomal hernia. Plan:   -Please make NPO at midnight  -IR labs ordered  -IR consult placed for L nephrostomy tube placement tomorrow for decompression of L kidney given hydronephrosis/obstruction in the setting of febrile UTI. If patient starts to decompensate due to infection, then would consult IR for emergent L nephrostomy tube placement  -Continue IV antibiotics  -Will follow     Guy Wong M.D.     [facility]  [address]  Phone: [phone]  Fax: [phone]

## 2021-06-19 NOTE — CONSULTS
Interventional Radiology    Consult from Urology for left percutaneous nephrostomy catheter. Images and chart reviewed    Patient has normal Cr. and WBC and is hemodynaically stable with low grade intermitent fevers. If patient remains stable will plan for Monday procedure, if patient decompensates will perform emergently over weekend.

## 2021-06-19 NOTE — PROGRESS NOTES
Problem: Falls - Risk of  Goal: *Absence of Falls  Description: Document Dolores Robles Fall Risk and appropriate interventions in the flowsheet.   Outcome: Progressing Towards Goal  Note: Fall Risk Interventions:            Medication Interventions: Evaluate medications/consider consulting pharmacy, Patient to call before getting OOB, Teach patient to arise slowly    Elimination Interventions: Call light in reach, Patient to call for help with toileting needs, Toileting schedule/hourly rounds              Problem: Patient Education: Go to Patient Education Activity  Goal: Patient/Family Education  Outcome: Progressing Towards Goal

## 2021-06-19 NOTE — PROGRESS NOTES
Hospitalist       Name:  Vicenta Alvarez  Age:80 y.o. Sex:male   :  1940    MRN:  [id number]   PCP:  Sheri Billingsley MD      Admit Date:  2021  1:42 PM     Assessment & Plan:     1- Complicated UTI with sepsis, improving  Pt is s/p ileal conduit since  following radical cystoprostatectomy  Pt follows up with Dr. Joseph Lopez as outpatient  F/u with urine and blood culture- NTD  LA 2.7  S/p 2  ltrs bolus IVF in ER  Repeat LA 1.5  IV rocephin  Urology consult in AM    2- Acute kidney injury on CKD-2, improved to baseline  3- Acute encephalopathy, due to #1,  resolved  4- HTN: controlled  5- Hx of Anxiety/depression, CABG. Stable. Dispo: likely home      subj      68 y.o. 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 Pt is alert, awake reports feeling somewhat better after IV fluids in ER. He reports feeling weak, and having a fall as he felt extremely weak in his leg. He denies chest pain, SOB, cough, but does report mild abdominal discomfort in around ileal conduit site. Pt denies nausea/vomiting, fever, chills, diarrhea. ER work up remarkable for Cr 1.74, UA with 4+ bacteriuria, LA 2.7, A1c 6.0. CT head and CXR unremarkable.     Today, feels better, afebrile, some loose stools    Objective:     Patient Vitals for the past 24 hrs:   Temp Pulse Resp BP SpO2   21 0750 99.3 °F (37.4 °C) 67 17 (!) 111/51 95 %   21 0405 100 °F (37.8 °C) 65 18 129/62 95 %   21 0023 (!) 100.7 °F (38.2 °C) 72 18 128/68 97 %   21 1944 98 °F (36.7 °C) 94 18 125/68 96 %   21 1840 97.9 °F (36.6 °C) 61 15 112/69 98 %   21 1730 98.6 °F (37 °C)       21 1729  63 13 (!) 107/55    21 1659  65 14  95 %   21 1658  64  (!) 105/55    21 1646  69 13 (!) 113/57 98 %   06/18/21 1533     96 %   06/18/21 1458  77 15  98 %   06/18/21 1445  72 19  99 %   06/18/21 1347 99.5 °F (37.5 °C) 84 18 135/67 96 %   06/18/21 1345  81  135/67 96 %       Oxygen Therapy  O2 Sat (%): 95 % (06/19/21 0750)  Pulse via Oximetry: 66 beats per minute (06/18/21 1659)  O2 Device: None (Room air) (06/18/21 1533)    Body mass index is 25.4 kg/m². Physical Exam:    General:  Alert, awake, elderly, mild distress  Lungs:  Clear to auscultation bilaterally   CV:   Regular rate and rhythm with normal S1 and S2   Abdomen:  Soft, nondistended, normoactive bowel sounds, ileal conduit site with bag filled with clear urine with mucus around the stoma, no bleeding noted. Extremities:  No cyanosis clubbing or edema   Neuro:  Grossly intact  Psych:  AO x3, mood and affect appropriate      Data Reviewed: I have reviewed all labs, meds, and studies. Recent Results (from the past 24 hour(s))   LACTIC ACID    Collection Time: 06/18/21  1:55 PM   Result Value Ref Range    Lactic acid 2.7 (H) 0.4 - 2.0 MMOL/L   CBC WITH AUTOMATED DIFF    Collection Time: 06/18/21  1:55 PM   Result Value Ref Range    WBC 10.5 4.3 - 11.1 K/uL    RBC 4.02 (L) 4.23 - 5.6 M/uL    HGB 12.7 (L) 13.6 - 17.2 g/dL    HCT 38.0 (L) 41.1 - 50.3 %    MCV 94.5 79.6 - 97.8 FL    MCH 31.6 26.1 - 32.9 PG    MCHC 33.4 31.4 - 35.0 g/dL    RDW 13.6 11.9 - 14.6 %    PLATELET 754 (L) 886 - 450 K/uL    MPV 9.9 9.4 - 12.3 FL    ABSOLUTE NRBC 0.00 0.0 - 0.2 K/uL    DF AUTOMATED      NEUTROPHILS 83 (H) 43 - 78 %    LYMPHOCYTES 8 (L) 13 - 44 %    MONOCYTES 8 4.0 - 12.0 %    EOSINOPHILS 1 0.5 - 7.8 %    BASOPHILS 0 0.0 - 2.0 %    IMMATURE GRANULOCYTES 1 0.0 - 5.0 %    ABS. NEUTROPHILS 8.7 (H) 1.7 - 8.2 K/UL    ABS. LYMPHOCYTES 0.8 0.5 - 4.6 K/UL    ABS. MONOCYTES 0.8 0.1 - 1.3 K/UL    ABS. EOSINOPHILS 0.1 0.0 - 0.8 K/UL    ABS. BASOPHILS 0.0 0.0 - 0.2 K/UL    ABS. IMM.  GRANS. 0.1 0.0 - 0.5 K/UL   METABOLIC PANEL, COMPREHENSIVE Collection Time: 06/18/21  1:55 PM   Result Value Ref Range    Sodium 137 (L) 138 - 145 mmol/L    Potassium 4.0 3.5 - 5.1 mmol/L    Chloride 104 98 - 107 mmol/L    CO2 25 21 - 32 mmol/L    Anion gap 8 7 - 16 mmol/L    Glucose 130 (H) 65 - 100 mg/dL    BUN 24 (H) 8 - 23 MG/DL    Creatinine 1.74 (H) 0.8 - 1.5 MG/DL    GFR est AA 49 (L) >60 ml/min/1.73m2    GFR est non-AA 41 (L) >60 ml/min/1.73m2    Calcium 8.8 8.3 - 10.4 MG/DL    Bilirubin, total 0.9 0.2 - 1.1 MG/DL    ALT (SGPT) 23 12 - 65 U/L    AST (SGOT) 17 15 - 37 U/L    Alk.  phosphatase 45 (L) 50 - 136 U/L    Protein, total 7.4 6.3 - 8.2 g/dL    Albumin 3.5 3.2 - 4.6 g/dL    Globulin 3.9 (H) 2.3 - 3.5 g/dL    A-G Ratio 0.9 (L) 1.2 - 3.5     PROCALCITONIN    Collection Time: 06/18/21  1:55 PM   Result Value Ref Range    Procalcitonin 1.82 ng/mL   CK    Collection Time: 06/18/21  1:55 PM   Result Value Ref Range     (H) 21 - 215 U/L   URINALYSIS W/ RFLX MICROSCOPIC    Collection Time: 06/18/21  2:04 PM   Result Value Ref Range    Color CRISTOPHER      Appearance TURBID      Specific gravity 1.016 1.001 - 1.023      pH (UA) 7.5 5.0 - 9.0      Protein 100 (A) NEG mg/dL    Glucose Negative mg/dL    Ketone TRACE (A) NEG mg/dL    Bilirubin Negative NEG      Blood MODERATE (A) NEG      Urobilinogen 0.2 0.2 - 1.0 EU/dL    Nitrites Negative NEG      Leukocyte Esterase LARGE (A) NEG      WBC 20-50 0 /hpf    RBC 0-3 0 /hpf    Bacteria 3+ (H) 0 /hpf    Amorphous Crystals TRACE 0      Other observations RESULTS VERIFIED MANUALLY     CULTURE, URINE    Collection Time: 06/18/21  2:04 PM    Specimen: Urine    SUPRAPUBIC URINE   Result Value Ref Range    Special Requests: NO SPECIAL REQUESTS      Culture result: (A)       >100,000 COLONIES/mL GRAM NEGATIVE RODS SUBCULTURE IN PROGRESS    Culture result: CULTURE IN PROGRESS,FURTHER UPDATES TO FOLLOW     EKG, 12 LEAD, INITIAL    Collection Time: 06/18/21  2:04 PM   Result Value Ref Range    Ventricular Rate 78 BPM    Atrial Rate 78 BPM    P-R Interval 192 ms    QRS Duration 102 ms    Q-T Interval 366 ms    QTC Calculation (Bezet) 417 ms    Calculated P Axis 78 degrees    Calculated R Axis 58 degrees    Calculated T Axis 83 degrees    Diagnosis       !! AGE AND GENDER SPECIFIC ECG ANALYSIS !! Sinus rhythm with Premature atrial complexes  Incomplete right bundle branch block  Borderline ECG  When compared with ECG of 16-JAN-2011 09:18,  Premature atrial complexes are now Present  Vent. rate has increased BY  33 BPM  Confirmed by Andre Hancock MD (), Chase Jarrell ([id number]) on 6/18/2021 6:26:06 PM     LACTIC ACID    Collection Time: 06/18/21  4:28 PM   Result Value Ref Range    Lactic acid 1.5 0.4 - 2.0 MMOL/L   METABOLIC PANEL, COMPREHENSIVE    Collection Time: 06/19/21  6:04 AM   Result Value Ref Range    Sodium 140 138 - 145 mmol/L    Potassium 3.6 3.5 - 5.1 mmol/L    Chloride 112 (H) 98 - 107 mmol/L    CO2 22 21 - 32 mmol/L    Anion gap 6 (L) 7 - 16 mmol/L    Glucose 101 (H) 65 - 100 mg/dL    BUN 24 (H) 8 - 23 MG/DL    Creatinine 1.42 0.8 - 1.5 MG/DL    GFR est AA >60 >60 ml/min/1.73m2    GFR est non-AA 51 (L) >60 ml/min/1.73m2    Calcium 7.5 (L) 8.3 - 10.4 MG/DL    Bilirubin, total 0.6 0.2 - 1.1 MG/DL    ALT (SGPT) 20 12 - 65 U/L    AST (SGOT) 20 15 - 37 U/L    Alk.  phosphatase 37 (L) 50 - 136 U/L    Protein, total 6.0 (L) 6.3 - 8.2 g/dL    Albumin 2.6 (L) 3.2 - 4.6 g/dL    Globulin 3.4 2.3 - 3.5 g/dL    A-G Ratio 0.8 (L) 1.2 - 3.5     MAGNESIUM    Collection Time: 06/19/21  6:04 AM   Result Value Ref Range    Magnesium 1.8 1.8 - 2.4 mg/dL   CBC W/O DIFF    Collection Time: 06/19/21  6:04 AM   Result Value Ref Range    WBC 5.5 4.3 - 11.1 K/uL    RBC 3.31 (L) 4.23 - 5.6 M/uL    HGB 10.4 (L) 13.6 - 17.2 g/dL    HCT 31.5 (L) 41.1 - 50.3 %    MCV 95.2 79.6 - 97.8 FL    MCH 31.4 26.1 - 32.9 PG    MCHC 33.0 31.4 - 35.0 g/dL    RDW 13.7 11.9 - 14.6 %    PLATELET 91 (L) 062 - 450 K/uL    MPV 10.2 9.4 - 12.3 FL    ABSOLUTE NRBC 0.00 0.0 - 0.2 K/uL       EKG Results     Procedure 720 Value Units Date/Time    EKG, 12 LEAD, INITIAL [[id number]] Collected: 06/18/21 1404    Order Status: Completed Updated: 06/18/21 1826     Ventricular Rate 78 BPM      Atrial Rate 78 BPM      P-R Interval 192 ms      QRS Duration 102 ms      Q-T Interval 366 ms      QTC Calculation (Bezet) 417 ms      Calculated P Axis 78 degrees      Calculated R Axis 58 degrees      Calculated T Axis 83 degrees      Diagnosis --     !! AGE AND GENDER SPECIFIC ECG ANALYSIS !! Sinus rhythm with Premature atrial complexes  Incomplete right bundle branch block  Borderline ECG  When compared with ECG of 16-JAN-2011 09:18,  Premature atrial complexes are now Present  Vent.  rate has increased BY  33 BPM  Confirmed by [facility]  MD (), Jaquelin Caldera ([id number]) on 6/18/2021 6:26:06 PM            All Micro Results     Procedure Component Value Units Date/Time    CULTURE, URINE [[id number]]  (Abnormal) Collected: 06/18/21 1404    Order Status: Completed Specimen: Urine Updated: 06/19/21 0924     Special Requests: NO SPECIAL REQUESTS        Culture result:       >100,000 COLONIES/mL GRAM NEGATIVE RODS SUBCULTURE IN PROGRESS                  CULTURE IN PROGRESS,FURTHER UPDATES TO FOLLOW          BLOOD CULTURE [[id number]] Collected: 06/18/21 1628    Order Status: Completed Specimen: Blood Updated: 06/18/21 1742    BLOOD CULTURE [[id number]] Collected: 06/18/21 1355    Order Status: Completed Specimen: Blood Updated: 06/18/21 1506          Other Studies:  CT HEAD WO CONT    Result Date: 6/18/2021  EXAMINATION: HEAD CT WITHOUT CONTRAST 6/18/2021 3:14 PM ACCESSION NUMBER: [id number] INDICATION: AMS/confuson; fever; recent fall severall days ago COMPARISON: None available TECHNIQUE: Multiple-row detector helical CT examination of the head without intravenous contrast. Radiation dose reduction techniques were used for this study:  Our CT scanners use one or all of the following: Automated exposure control, adjustment of the mA and/or kVp according to patient's size, iterative reconstruction. FINDINGS: The ventricles are within normal limits for the degree of global brain parenchymal volume loss. There is no midline shift. The basilar cisterns are patent. There is no cerebellar tonsillar ectopia or herniation. Hypodensities in the periventricular and subcortical white matter are nonspecific, but they are most likely to represent chronic microangiopathy. There is no evidence of acute intracranial hemorrhage or extra-axial collections. There is no CT evidence of acute infarction. Small left maxillary sinus mucus retention cyst. No evidence of skull fracture. No acute intracranial abnormality. VOICE DICTATED BY: Dr. Jone Keene     XR CHEST PORT    Result Date: 6/18/2021  EXAMINATION: CHEST RADIOGRAPH 6/18/2021 1:59 PM ACCESSION NUMBER: [id number] INDICATION: meets SIRS criteria COMPARISON: Chest x-ray 1/16/2011, 8/7/2008 TECHNIQUE: A single AP view of the chest was obtained. FINDINGS: Support Lines and Tubes: None Cardiac Silhouette: Prior median sternotomy. There is unchanged mild enlargement of the cardiac silhouette. Lungs: No focal airspace disease. Pleura: No pleural effusion. No pneumothorax. Osseous Structures: Unremarkable. Upper Abdomen: Unremarkable. 1. Unchanged mild enlargement of the cardiac silhouette. 2. No focal airspace disease.  VOICE DICTATED BY: Dr. Jone Keene        Medications:  Medications Administered       acetaminophen (TYLENOL) tablet 650 mg       Admin Date  03/13/2021 Action  Given Dose  650 mg Route  Oral Administered By  Gris Cabrera RN              cefTRIAXone (ROCEPHIN) 1 g in 0.9% sodium chloride (MBP/ADV) 50 mL MBP       Admin Date  03/13/2021 Action  New Bag Dose  1 g Rate  100 mL/hr Route  IntraVENous Administered By  Jimmy Montana RN              cefTRIAXone (ROCEPHIN) 2 g in 0.9% sodium chloride (MBP/ADV) 50 mL MBP       Admin Date  03/13/2021 Action  New Bag Dose  2 g Rate  100 mL/hr Route  IntraVENous Administered By  Robbie Brownlee RN               Admin Date  03/14/2021 Action  New Bag Dose  2 g Rate  100 mL/hr Route  IntraVENous Administered By  Colt Garcia RN               Admin Date  03/15/2021 Action  New Bag Dose  2 g Rate  100 mL/hr Route  IntraVENous Administered By  Colt Garcia RN               Admin Date  03/16/2021 Action  New Bag Dose  2 g Rate  100 mL/hr Route  IntraVENous Administered By  Mariia Iqbal              diphenhydrAMINE (BENADRYL) injection 25 mg       Admin Date  03/14/2021 Action  Given Dose  25 mg Route  IntraVENous Administered By  Concha Paez RN              doxycycline (VIBRAMYCIN) 100 mg in 0.9% sodium chloride (MBP/ADV) 100 mL MBP       Admin Date  03/13/2021 Action  New Bag Dose  100 mg Rate  100 mL/hr Route  IntraVENous Administered By  Robbie Brownlee RN               Admin Date  03/13/2021 Action  New Bag Dose  100 mg Rate  100 mL/hr Route  IntraVENous Administered By  Concha Paez RN               Admin Date  03/14/2021 Action  New Bag Dose  100 mg Rate  100 mL/hr Route  IntraVENous Administered By  Colt Garcia RN               Admin Date  03/14/2021 Action  New Bag Dose  100 mg Rate  100 mL/hr Route  IntraVENous Administered By  Concha Paez RN               Admin Date  03/15/2021 Action  New Bag Dose  100 mg Rate  100 mL/hr Route  IntraVENous Administered By  Colt Garcia RN              enoxaparin (LOVENOX) injection 40 mg       Admin Date  03/13/2021 Action  Given Dose  40 mg Route  SubCUTAneous Administered By  Robbie Brownlee RN               Admin Date  03/13/2021 Action  Given Dose  40 mg Route  SubCUTAneous Administered By  Concha Paez RN               Admin Date  03/14/2021 Action  Given Dose  40 mg Route  SubCUTAneous Administered By  Colt Garcia RN               Admin Date  03/14/2021 Action  Given Dose  40 mg Route  SubCUTAneous Administered By  Concha Paez RN               Admin Date  03/15/2021 Action  Given Dose  40 mg Route  SubCUTAneous Administered By  Marciano Orozco RN               Admin Date  03/15/2021 Action  Given Dose  40 mg Route  SubCUTAneous Administered By  Tahir Barcenas RN               Admin Date  03/16/2021 Action  Given Dose  40 mg Route  SubCUTAneous Administered By  Garth Erazo              fentaNYL citrate (PF) injection 50 mcg       Admin Date  03/13/2021 Action  Given Dose  50 mcg Route  IntraVENous Administered By  Jesus Hardy, RN               Admin Date  03/14/2021 Action  Given Dose  50 mcg Route  IntraVENous Administered By  Guyann Kussmaul, RN              ferrous sulfate tablet 325 mg       Admin Date  03/14/2021 Action  Given Dose  325 mg Route  Oral Administered By  Marciano Orozco RN               Admin Date  03/14/2021 Action  Given Dose  325 mg Route  Oral Administered By  Marciano Orozco RN               Admin Date  03/15/2021 Action  Given Dose  325 mg Route  Oral Administered By  Marciano Orozco RN              folic acid (FOLVITE) tablet 1 mg       Admin Date  03/14/2021 Action  Given Dose  1 mg Route  Oral Administered By  Marciano Orozco RN               Admin Date  03/15/2021 Action  Given Dose  1 mg Route  Oral Administered By  Mraciano Orozco RN               Admin Date  03/16/2021 Action  Given Dose  1 mg Route  Oral Administered By  Garth Erazo              furosemide (LASIX) injection 20 mg       Admin Date  03/15/2021 Action  Given Dose  20 mg Route  IntraVENous Administered By  Marciano Orozco RN              furosemide (LASIX) injection 40 mg       Admin Date  03/13/2021 Action  Given Dose  40 mg Route  IntraVENous Administered By  Hilton Salgado RN               Admin Date  03/13/2021 Action  Given Dose  40 mg Route  IntraVENous Administered By  Guyann Kussmaul, RN               Admin Date  03/14/2021 Action  Given Dose  40 mg Route  IntraVENous Administered By  Marciano Orozco RN               Admin Date  03/14/2021 Action  Given Dose  40 mg Route  IntraVENous Administered By  Concha Paez RN               Admin Date  03/15/2021 Action  Given Dose  40 mg Route  IntraVENous Administered By  Dia Jennings RN               Admin Date  03/16/2021 Action  Given Dose  40 mg Route  IntraVENous Administered By  Mariia Iqbal              HYDROcodone-acetaminophen [facility]) 5-325 mg per tablet 1 Tab       Admin Date  03/14/2021 Action  Given Dose  1 Tab Route  Oral Administered By  Colt Garcia RN               Admin Date  03/15/2021 Action  Given Dose  1 Tab Route  Oral Administered By  Concha Paez RN              insulin lispro (HUMALOG) injection       Admin Date  03/13/2021 Action  Given Dose  2 Units Route  SubCUTAneous Administered By  Concha Paez RN               Admin Date  03/14/2021 Action  Given Dose  2 Units Route  SubCUTAneous Administered By  Colt Garcia RN               Admin Date  03/14/2021 Action  Given Dose  2 Units Route  SubCUTAneous Administered By  Colt Garcia RN               Admin Date  03/14/2021 Action  Given Dose  4 Units Route  SubCUTAneous Administered By  Concha Paez RN               Admin Date  03/15/2021 Action  Given Dose  2 Units Route  SubCUTAneous Administered By  Colt Garcia RN               Admin Date  03/15/2021 Action  Given Dose  4 Units Route  SubCUTAneous Administered By  Colt Garcia RN               Admin Date  03/15/2021 Action  Given Dose  2 Units Route  SubCUTAneous Administered By  Dia Jennings RN               Admin Date  03/16/2021 Action  Given Dose  4 Units Route  SubCUTAneous Administered By  Mariia Iqbal              levothyroxine (SYNTHROID) tablet 137 mcg       Admin Date  03/14/2021 Action  Given Dose  137 mcg Route  Oral Administered By  Colt Garcia RN               Admin Date  03/15/2021 Action  Given Dose  137 mcg Route  Oral Administered By  Colt Garcia RN               Admin Date  03/16/2021 Action  Given Dose  137 mcg Route  Oral Administered By  Tawanda Messer RN              midodrine (PROAMATINE) tablet 10 mg       Admin Date  03/14/2021 Action  Given Dose  10 mg Route  Oral Administered By  Agueda Rodríguez RN               Admin Date  03/14/2021 Action  Given Dose  10 mg Route  Oral Administered By  Agueda Rodríguez RN               Admin Date  03/15/2021 Action  Given Dose  10 mg Route  Oral Administered By  Agueda Rodríguez RN               Admin Date  03/15/2021 Action  Given Dose  10 mg Route  Oral Administered By  Agueda Rodríguez RN               Admin Date  03/15/2021 Action  Given Dose  10 mg Route  Oral Administered By  Agueda Rodríguez RN               Admin Date  03/16/2021 Action  Given Dose  10 mg Route  Oral Administered By  Carlo Vásquez Date  03/16/2021 Action  Given Dose  10 mg Route  Oral Administered By  Carlo Vásquez Date  03/16/2021 Action  Given Dose  10 mg Route  Oral Administered By  Dario hebert [facility]) injection 2 mg       Admin Date  03/13/2021 Action  Given Dose  2 mg Route  IntraVENous Administered By  Rubi Clark RN              NOREPINephrine (LEVOPHED) 4 mg in 5% dextrose 250 mL infusion       Admin Date  03/14/2021 Action  New Bag Dose  4 mcg/min Rate  15 mL/hr Route  IntraVENous Administered By  Agueda Rodríguez RN               Admin Date  03/14/2021 Action  Rate Change Dose  6 mcg/min Rate  22.5 mL/hr Route  IntraVENous Administered By  Agueda Rodríguez RN               Admin Date  03/14/2021 Action  Rate Change Dose  4 mcg/min Rate  15 mL/hr Route  IntraVENous Administered By  Agueda Rodríguez RN               Admin Date  03/14/2021 Action  Rate Change Dose  2 mcg/min Rate  7.5 mL/hr Route  IntraVENous Administered By  Agueda Rodríguez RN               Admin Date  03/14/2021 Action  Rate Change Dose  1 mcg/min Rate  3.8 mL/hr Route  IntraVENous Administered By  Agueda Rodríguez RN               Admin Date  03/14/2021 Action  Restarted Dose  2 mcg/min Rate  7.5 mL/hr Route  IntraVENous Administered By  Michelle Hernandez RN               Admin Date  03/14/2021 Action  Rate Change Dose  4 mcg/min Rate  15 mL/hr Route  IntraVENous Administered By  Michelle Hernandez RN               Admin Date  03/15/2021 Action  Rate Change Dose  2 mcg/min Rate  7.5 mL/hr Route  IntraVENous Administered By  Lorrei Pereira RN              [facility]) injection 4 mg       Admin Date  03/13/2021 Action  Given Dose  4 mg Route  IntraVENous Administered By  Devan Avila RN              pantoprazole (PROTONIX) tablet 40 mg       Admin Date  03/14/2021 Action  Given Dose  40 mg Route  Oral Administered By  Michelle Hernandez RN               Admin Date  03/15/2021 Action  Given Dose  40 mg Route  Oral Administered By  Michelle Hernandez RN               Admin Date  03/16/2021 Action  Given Dose  40 mg Route  Oral Administered By  Yvrose Euceda              perflutren lipid microspheres (DEFINITY) in NS bolus IV       Admin Date  03/13/2021 Action  Given Dose  1 mL Route  IntraVENous Administered By  CAROLINA Callaway              potassium chloride (K-DUR, KLOR-CON) SR tablet 40 mEq       Admin Date  03/15/2021 Action  Given Dose  40 mEq Route  Oral Administered By  Michelle Hernandez RN               Admin Date  03/16/2021 Action  Given Dose  40 mEq Route  Oral Administered By  Yvrose Euceda              potassium chloride 40 mEq IVPB       Admin Date  03/15/2021 Action  New Bag Dose  40 mEq Rate  25 mL/hr Route  IntraVENous Administered By  Lorrie Pereira RN              pregabalin (LYRICA) capsule 300 mg       Admin Date  03/14/2021 Action  Given Dose  300 mg Route  Oral Administered By  Michelle Hernandez RN               Admin Date  03/14/2021 Action  Given Dose  300 mg Route  Oral Administered By  Michelle Hernandez RN               Admin Date  03/15/2021 Action  Given Dose  300 mg Route  Oral Administered By  Michelle Hernandez RN               Admin Date  03/15/2021 Action  Given Dose  300 mg Route  Oral Administered By  Shell Travis RN               Admin Date  03/16/2021 Action  Given Dose  300 mg Route  Oral Administered By  Naveed Quiroz              sertraline (ZOLOFT) tablet 300 mg       Admin Date  03/14/2021 Action  Given Dose  300 mg Route  Oral Administered By  Emma Mckinley RN               Admin Date  03/15/2021 Action  Given Dose  300 mg Route  Oral Administered By  Emma Mckinley RN               Admin Date  03/16/2021 Action  Given Dose  300 mg Route  Oral Administered By  Naveed Quiroz              sodium chloride (NS) flush 5-40 mL       Admin Date  03/13/2021 Action  Given Dose  10 mL Route  IntraVENous Administered By  Gilson Rivera RN               Admin Date  03/13/2021 Action  Given Dose  30 mL Route  IntraVENous Administered By  Gilson Rivera RN               Admin Date  03/13/2021 Action  Given Dose  40 mL Route  IntraVENous Administered By  Anel Barnes RN               Admin Date  03/14/2021 Action  Given Dose  40 mL Route  IntraVENous Administered By  Anel Barnes RN               Admin Date  03/14/2021 Action  Given Dose  10 mL Route  IntraVENous Administered By  Emma Mckinley RN               Admin Date  03/14/2021 Action  Given Dose  40 mL Route  IntraVENous Administered By  Anel Barnes RN               Admin Date  03/15/2021 Action  Given Dose  40 mL Route  IntraVENous Administered By  Anel Barnes RN               Admin Date  03/15/2021 Action  Given Dose  10 mL Route  IntraVENous Administered By  Emma Mckinley RN               Admin Date  03/15/2021 Action  Given Dose  10 mL Route  IntraVENous Administered By  Griselda De Los Santos RN               Admin Date  03/16/2021 Action  Given Dose  10 mL Route  IntraVENous Administered By  Naveed Quiroz              sodium chloride 0.9 % bolus infusion 250 mL       Admin Date  03/14/2021 Action  New Bag Dose  250 mL Rate  250 mL/hr Route  IntraVENous Administered By  Quin Hirsch RN              tiZANidine (ZANAFLEX) tablet 4 mg       Admin Date  03/14/2021 Action  Given Dose  4 mg Route  Oral Administered By  Mouan Wright RN               Admin Date  03/14/2021 Action  Given Dose  4 mg Route  Oral Administered By  Quin Hirsch RN               Admin Date  03/14/2021 Action  Given Dose  4 mg Route  Oral Administered By  Quin Hirsch RN               Admin Date  03/14/2021 Action  Given Dose  4 mg Route  Oral Administered By  Mouna Wright RN               Admin Date  03/15/2021 Action  Given Dose  4 mg Route  Oral Administered By  Quin Hirsch RN               Admin Date  03/15/2021 Action  Given Dose  4 mg Route  Oral Administered By  Quin Hirsch RN               Admin Date  03/15/2021 Action  Given Dose  4 mg Route  Oral Administered By  Dorinda Bland RN               Admin Date  03/16/2021 Action  Given Dose  4 mg Route  Oral Administered By  Marko Giordanoke Date  03/16/2021 Action  Given Dose  4 mg Route  Oral Administered By  Beltran Hidalgo              traZODone (DESYREL) tablet 150 mg       Admin Date  03/14/2021 Action  Given Dose  150 mg Route  Oral Administered By  Mouna Wright RN               Admin Date  03/15/2021 Action  Given Dose  150 mg Route  Oral Administered By  Dorinda Bland RN              tuberculin injection 5 Units       Admin Date  03/15/2021 Action  Give PPD Dose  5 Units Route  IntraDERMal Administered By  Quin Hirsch RN              vancomycin (VANCOCIN) 2500 mg in  mL infusion       Admin Date  03/13/2021 Action  Given Dose  2,500 mg Rate  200 mL/hr Route  IntraVENous Administered By  Leonid Mondragon RN              zonisamide (ZONEGRAN) capsule 300 mg       Admin Date  03/14/2021 Action  Given Dose  300 mg Route  Oral Administered By  Mouna Wright RN               Admin Date  03/15/2021 Action  Given Dose  300 mg Route  Oral Administered By  Dorinda Bland RN Problem List:     Hospital Problems as of 6/19/2021 Date Reviewed: 5/20/2021        Codes Class Noted - Resolved POA    Urinary tract infection ICD-10-CM: N39.0  ICD-9-CM: 599.0  6/18/2021 - Present Unknown        * (Principal) Severe sepsis ([facility] ICD-10-CM: A41.9, R65.20  ICD-9-CM: 038.9, 995.92  6/18/2021 - Present Unknown        Acute encephalopathy ICD-10-CM: G93.40  ICD-9-CM: 348.30  6/18/2021 - Present Unknown        Stage 3a chronic kidney disease ([facility] ICD-10-CM: N18.31  ICD-9-CM: 347. 3  Unknown - Present Yes        Major depressive disorder with single episode, in full remission ([facility] ICD-10-CM: F32.5  ICD-9-CM: 296.26  Unknown - Present Yes        Essential hypertension ICD-10-CM: I10  ICD-9-CM: 401.9  7/31/2017 - Present Yes        Mixed hyperlipidemia ICD-10-CM: E78.2  ICD-9-CM: 272.2  7/31/2017 - Present Yes        Coronary artery disease involving coronary bypass graft of native heart without angina pectoris ICD-10-CM: I25.810  ICD-9-CM: 414.05  11/6/2015 - Present Yes                 Signed By: Hai Huffman MD   [employer] [facility]    June 19, 2021  4:22 PM

## 2021-06-19 NOTE — CONSULTS
[address] Urology Consult Note                                           06/19/21     Patient: Manuela Cabrera  MRN: [id number]    Admission Date:  6/18/2021, 1  Admission Diagnosis: Urinary tract infection [N39.0]; Severe sepsis ([facility] [A41.9, R65.20]; Acute encephalopathy [G93.40]  Reason for Consult: UTI in the setting of ileal conduit    ASSESSMENT: 68 y.o.  male with sepsis possibly of urinary origin with an ileal conduit s/p RC/IC for bladder cancer in 2008. He has a remote history of stones but denies any current stone symptoms. Currently clinically stable with intermittent fevers on the floor. Cultures pending. PLAN:  -Stat CT urogram today to rule out stone or other urologic pathology exacerbating the UTI  -Continue IV rocephin  -Follow up cultures  -Follow daily labs and fever curve  -Will follow. __________________________________________________________________________________    HPI:     Manuela Cabrera is a 68 y.o. male with a PMH of bladder cancer s/p RC/IC in 2008 who follows with Dr. Juana Celis. He presented to the hospital on 6/18/21 with confusion, weakness and a fall. Work up in the ER was concerning for UTI as the source of his acute onset of symptoms. U/A from conduit concerning for infection vs. Chronic colonization. He has been febrile up to 100.7 with last fever about 10 hours ago. Cultures pending. CT head and CXR negative for source. Cr elevated to 1.74 above baseline of 1.37. He is on IV rocephin for treatment of presumed UTI, but has not had any imaging. He denies hematuria. He does report a distant history of kidney stones. Denies flank pain/abdominal pain.       Past Medical History:  Past Medical History:   Diagnosis Date    Acute encephalopathy 6/18/2021    Arthritis     OA- bilat knees    Bladder absent     stoma present    Bladder cancer ([facility]     CAD (coronary artery disease)     CABG x 4 in 2004 - no damage- emergenc CABG- Cath 1/2011- total occlusion ramus, total mid occlusion left anterior descending, 50-70% stenosis of mid right coronary artery- \"Normal LEF\"    Cancer ([address] 1/1/2008    bladder- chemo in bladder then 7/11 bladder removed    CRI (chronic renal insufficiency), stage 3 (moderate) (HCC)     Depression     Family history of diabetes mellitus     Heart murmur     mild mitral regurgitation    Hypercholesteremia     Hypertension     Infection     to s/p hernia wound 1/1098    Other complication of colostomy or enterostomy     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    Followed by surgeon Dr Ly Morrison intestinal obstruction     Urinary tract infection 6/18/2021    Ventral hernia, unspecified, without mention of obstruction or gangrene        Past Surgical History:  Past Surgical History:   Procedure Laterality Date    HX COLONOSCOPY  1/07    repeat 10 years    HX HEART CATHETERIZATION  2011    HX HERNIA REPAIR  8/26/11    hernia repair    HX HERNIA REPAIR  9/2011    9 days later emergency s/p hernia repair surg    HX VASECTOMY  1974    SD CARDIAC SURG PROCEDURE UNLIST  2004    CABG x 4    SD REMV BLADDER,TOTAL  7/2008    ostomy       Medications:  No current facility-administered medications on file prior to encounter. Current Outpatient Medications on File Prior to Encounter   Medication Sig Dispense Refill    losartan (COZAAR) 100 mg tablet Take 1 Tab by mouth daily. 90 Tab 3    FLUoxetine (PROzac) 20 mg capsule Take 1 Cap by mouth daily. 90 Cap 1    simvastatin (ZOCOR) 80 mg tablet TAKE 1 TABLET AT BEDTIME 90 Tab 3    aspirin delayed-release 81 mg tablet Take 1 Tab by mouth daily.  Indications: MYOCARDIAL INFARCTION PREVENTION 1 Tab 1       Allergies:  No Known Allergies    Social History:  Social History     Socioeconomic History    Marital status:      Spouse name: Not on file    Number of children: Not on file    Years of education: Not on file    Highest education level: Not on file   Occupational History    Not on file   Tobacco Use    Smoking status: Never Smoker    Smokeless tobacco: Never Used   Substance and Sexual Activity    Alcohol use: No    Drug use: Never    Sexual activity: Not Currently   Other Topics Concern    Not on file   Social History Narrative    Wife  2016, two sons     Social Determinants of Health     Financial Resource Strain:     Difficulty of Paying Living Expenses:    Food Insecurity:     Worried About Running Out of Food in the Last Year:     [address] in the Last Year:    Transportation Needs:     Lack of Transportation (Medical):      Lack of Transportation (Non-Medical):    Physical Activity:     Days of Exercise per Week:     Minutes of Exercise per Session:    Stress:     Feeling of Stress :    Social Connections:     Frequency of Communication with Friends and Family:     Frequency of Social Gatherings with Friends and Family:     Attends Gnosticism Services:     Active Member of Clubs or Organizations:     Attends Club or Organization Meetings:     Marital Status:    Intimate Partner Violence:     Fear of Current or Ex-Partner:     Emotionally Abused:     Physically Abused:     Sexually Abused:        Family History:  Family History   Problem Relation Age of Onset    Heart Disease Father 37        MI    Hypertension Father     Heart Disease Sister     Arthritis-osteo Sister     Cancer Mother         OVARY    Diabetes Sister     Hypertension Sister        ROS:  Review of Systems - General ROS: negative for - chills, fatigue or fever  Respiratory ROS: no cough, shortness of breath, or wheezing  Cardiovascular ROS: no chest pain or dyspnea on exertion  Gastrointestinal ROS: positive for - abdominal pain  negative for - change in stools, constipation, diarrhea, hematemesis, nausea/vomiting or swallowing difficulty/pain  Genito-Urinary ROS: no dysuria, trouble voiding, or hematuria  Musculoskeletal ROS: negative  negative for - muscular weakness    Vitals:  Visit Vitals  BP (!) 111/51 (BP 1 Location: Left upper arm, BP Patient Position: At rest)   Pulse 67   Temp 99.3 °F (37.4 °C)   Resp 17   Ht 5' 10\" (1.778 m)   Wt 177 lb (80.3 kg)   SpO2 95%   BMI 25.40 kg/m²       Intake/Output:    Intake/Output Summary (Last 24 hours) at 6/19/2021 1047  Last data filed at 6/18/2021 2101  Gross per 24 hour   Intake 2530 ml   Output 150 ml   Net 2380 ml        Physical Exam:   Visit Vitals  BP (!) 111/51 (BP 1 Location: Left upper arm, BP Patient Position: At rest)   Pulse 67   Temp 99.3 °F (37.4 °C)   Resp 17   Ht 5' 10\" (1.778 m)   Wt 177 lb (80.3 kg)   SpO2 95%   BMI 25.40 kg/m²        GENERAL: No acute distress, Awake, Alert, Oriented X 3  CARDIAC: regular rate and rhythm  CHEST AND LUNG: Easy work of breathing  ABDOMEN: soft, non tender, non-distended, positive bowel sounds, no organomegaly, no palpable masses, no guarding, no rebound tenderness  : Ileal conduit with pink stoma, patent, producing clear yellow urine. No hematuria. Conduit with mild tenderness to palpation. SKIN: No rash, no erythema, no lacerations or abrasions, no ecchymosis  NEUROLOGIC: cranial nerves 2-12 grossly intact     Lab/Radiology/Other Diagnostic Tests:  Recent Labs     06/19/21  0604 06/18/21  1355   HGB 10.4* 12.7*   HCT 31.5* 38.0*   WBC 5.5 10.5    137*   K 3.6 4.0   * 104   CO2 22 25   BUN 24* 24*   * 130*   CA 7.5* 8.8   MG 1.8  --    AST 20 17   ALT 20 23         Guy Amin M.D.     [facility]  [address], [facility] [address]  Phone: [phone]  Fax: [phone]

## 2021-06-19 NOTE — PROGRESS NOTES
ACUTE PHYSICAL THERAPY GOALS:  (Developed with and agreed upon by patient and/or caregiver. )    LTG:  (1.)Mr. Tucker will move from supine to sit and sit to supine , scoot up and down and roll side to side in bed with INDEPENDENCE within 7 treatment day(s). (2.)Mr. Tucker will transfer from bed to chair and chair to bed with INDEPENDENCE using the least restrictive device within 7 treatment day(s). (3.)Mr. Tucker will ambulate with MODIFIED INDEPENDENCE for a minimum of 500 feet with the least restrictive device within 7 treatment day(s). ________________________________________________________________________________________________      PHYSICAL THERAPY ASSESSMENT: Initial Assessment, Daily Note and AM PT Treatment Day # 1      Yoli Gan is a 68 y.o. male   PRIMARY DIAGNOSIS: Severe sepsis ([facility]  Urinary tract infection [N39.0]  Severe sepsis ([facility] [A41.9, R65.20]  Acute encephalopathy [G93.40]       Reason for Referral:  Severe Sepsis  ICD-10: Treatment Diagnosis: Other abnormalities of gait and mobility (R26.89)  [place] / [place] / Product Type: Medicare /     ASSESSMENT:     REHAB RECOMMENDATIONS:   Recommendation to date pending progress:  Setting:   Home Health Therapy vs STR  Equipment:    Rolling Walker     PRIOR LEVEL OF FUNCTION:  (Prior to Hospitalization) INITIAL/CURRENT LEVEL OF FUNCTION:  (Most Recently Demonstrated)   Bed Mobility:   Independent  Sit to Stand:   Independent  Transfers:   Independent  Gait/Mobility:   Independent Bed Mobility:   Independent  Sit to Stand:  [facility] [employer] Assistance  Transfers:   Standby Assistance  Gait/Mobility:   Contact Guard Assistance     ASSESSMENT:  Mr. Rosenda Guillen is a 68year old male admitted for weakness and confusion post fall. Pt demonstrates some continued confusion noted with mobility.  Pt ambulated 250' with RW and CGA with some noted instability, impulsivity and decreased safety awareness. He required VC to for walker proximity throughout and ambulates with a forward flexed posture. Pt is functioning below his baseline at this time and would benefit from skilled PT to maximize rehab potential.      SUBJECTIVE:   Mr. Davi Pearson states, Dede Gist want me to tell them when I get up to go to the bathroom. \"    SOCIAL HISTORY/LIVING ENVIRONMENT: Pt lives alone in a single story house with 1 step to enter. He has had several recent falls, none requiring hospitalization until now, and uses a RW for mobility as well as a motorized scooter.       OBJECTIVE:     PAIN: VITAL SIGNS: LINES/DRAINS:   Pre Treatment: Pain Screen  Pain Scale 1: Numeric (0 - 10)  Pain Intensity 1: 0  Post Treatment: 0   IV  O2 Device: None (Room air)     GROSS EVALUATION:   Within Functional Limits Abnormal/ Functional Abnormal/ Non-Functional (see comments) Not Tested Comments:   AROM [x] [] [] []    PROM [] [] [] []    Strength [x] [] [] []    Balance [x] [] [] []    Posture [] [x] [] []    Sensation [] [] [] []    Coordination [] [] [] []    Tone [] [] [] []    Edema [] [] [] []    Activity Tolerance [x] [] [] []     [] [] [] []      COGNITION/  PERCEPTION: Intact Impaired   (see comments) Comments:   Orientation [x] []    Vision [x] []    Hearing [x] []    Command Following [] [x]    Safety Awareness [] [x]     [] []      MOBILITY: I Mod I S SBA CGA Min Mod Max Total  NT x2 Comments:   Bed Mobility    Rolling [] [] [] [] [] [] [] [] [] [x] []    Supine to Sit [x] [] [] [] [] [] [] [] [] [] []    Scooting [] [] [] [] [] [] [] [] [] [x] []    Sit to Supine [] [] [] [] [] [] [] [] [] [x] []    Transfers    Sit to Stand [] [] [] [x] [] [] [] [] [] [] []    Bed to Chair [] [] [] [] [] [] [] [] [] [x] []    Stand to Sit [] [] [] [x] [] [] [] [] [] [] []    I=Independent, Mod I=Modified Independent, S=Supervision, SBA=Standby Assistance, CGA=Contact Guard Assistance,   Min=Minimal Assistance, Mod=Moderate Assistance, Max=Maximal Assistance, Total=Total Assistance, NT=Not Tested  GAIT: I Mod I S SBA CGA Min Mod Max Total  NT x2 Comments:   Level of Assistance [] [] [] [] [x] [] [] [] [] [] []    Distance 250'    DME Rolling Walker    Gait Quality VC for walker proximity, decreased stability intermittently-no LOB    Weightbearing Status N/A     I=Independent, Mod I=Modified Independent, S=Supervision, SBA=Standby Assistance, CGA=Contact Guard Assistance,   Min=Minimal Assistance, Mod=Moderate Assistance, Max=Maximal Assistance, Total=Total Assistance, NT=Not [address] Form       How much difficulty does the patient currently have. .. Unable A Lot A Little None   1. Turning over in bed (including adjusting bedclothes, sheets and blankets)? [] 1   [] 2   [] 3   [x] 4   2. Sitting down on and standing up from a chair with arms ( e.g., wheelchair, bedside commode, etc.)   [] 1   [] 2   [x] 3   [] 4   3. Moving from lying on back to sitting on the side of the bed? [] 1   [] 2   [] 3   [x] 4   How much help from another person does the patient currently need. .. Total A Lot A Little None   4. Moving to and from a bed to a chair (including a wheelchair)? [] 1   [] 2   [x] 3   [] 4   5. Need to walk in hospital room? [] 1   [] 2   [x] 3   [] 4   6. Climbing 3-5 steps with a railing? [x] 1   [] 2   [] 3   [] 4   © 2007, Trustees of [address] [place] [address] [address] [id number], under license to [employer]. All rights reserved     Score:  Initial: 18 Most Recent: X (Date: -- )    Interpretation of Tool:  Represents activities that are increasingly more difficult (i.e. Bed mobility, Transfers, Gait). PLAN:   FREQUENCY/DURATION: PT Plan of Care: 3 times/week for duration of hospital stay or until stated goals are met, whichever comes first.    PROBLEM LIST:   (Skilled intervention is medically necessary to address:)  1. Decreased Balance  2. Decreased Cognition  3.  Decreased Coordination  4. Decreased Gait Ability  5. Decreased Transfer Abilities   INTERVENTIONS PLANNED:   (Benefits and precautions of physical therapy have been discussed with the patient.)  1. Therapeutic Activity  2. Therapeutic Exercise/HEP  3. Neuromuscular Re-education  4. Gait Training  5. Manual Therapy  6. Education     TREATMENT:     EVALUATION: Low Complexity : (Untimed Charge)    TREATMENT:   ($$ Therapeutic Activity: 8-22 mins    )  Therapeutic Activity (8 Minutes): Therapeutic activity included Supine to Sit, Sit to Supine, Transfer Training, Ambulation on level ground, Sitting balance  and Standing balance to improve functional Mobility, Strength, ROM and Activity tolerance.     TREATMENT GRID:  N/A    AFTER TREATMENT POSITION/PRECAUTIONS:  stretcher with transport    INTERDISCIPLINARY COLLABORATION:  RN/PCT, PT/PTA and OT/ERIC    TOTAL TREATMENT DURATION:  PT Patient Time In/Time Out  Time In: 1025  Time Out: [address]

## 2021-06-20 PROCEDURE — 74011250637 HC RX REV CODE- 250/637: Performed by: UROLOGY

## 2021-06-20 PROCEDURE — 74011250637 HC RX REV CODE- 250/637: Performed by: HOSPITALIST

## 2021-06-20 PROCEDURE — 74011000302 HC RX REV CODE- 302: Performed by: HOSPITALIST

## 2021-06-20 PROCEDURE — 74011250636 HC RX REV CODE- 250/636: Performed by: HOSPITALIST

## 2021-06-20 PROCEDURE — 99232 SBSQ HOSP IP/OBS MODERATE 35: CPT | Performed by: UROLOGY

## 2021-06-20 PROCEDURE — 74011000258 HC RX REV CODE- 258: Performed by: HOSPITALIST

## 2021-06-20 PROCEDURE — 2709999900 HC NON-CHARGEABLE SUPPLY

## 2021-06-20 PROCEDURE — 65270000029 HC RM PRIVATE

## 2021-06-20 PROCEDURE — 86580 TB INTRADERMAL TEST: CPT | Performed by: HOSPITALIST

## 2021-06-20 RX ADMIN — Medication 5 ML: at 14:00

## 2021-06-20 RX ADMIN — FLUOXETINE 20 MG: 10 CAPSULE ORAL at 08:22

## 2021-06-20 RX ADMIN — FAMOTIDINE 20 MG: 20 TABLET ORAL at 08:22

## 2021-06-20 RX ADMIN — TUBERCULIN PURIFIED PROTEIN DERIVATIVE 5 UNITS: 5 INJECTION, SOLUTION INTRADERMAL at 14:50

## 2021-06-20 RX ADMIN — ASPIRIN 81 MG: 81 TABLET ORAL at 08:22

## 2021-06-20 RX ADMIN — Medication 10 ML: at 23:00

## 2021-06-20 RX ADMIN — ACETAMINOPHEN 650 MG: 325 TABLET ORAL at 08:23

## 2021-06-20 RX ADMIN — LOSARTAN POTASSIUM 50 MG: 50 TABLET, FILM COATED ORAL at 08:23

## 2021-06-20 RX ADMIN — CEFTRIAXONE 2 G: 2 INJECTION, POWDER, FOR SOLUTION INTRAMUSCULAR; INTRAVENOUS at 14:47

## 2021-06-20 NOTE — PROGRESS NOTES
Hospitalist       Name:  Yoli Gan  Age:80 y.o. Sex:male   :  1940    MRN:  [id number]   PCP:  Jenny Lizarraga MD      Admit Date:  2021  1:42 PM     Assessment & Plan:     1- Complicated UTI with sepsis, improving, urinary G-rods  Pt is s/p ileal conduit since  following radical cystoprostatectomy  Pt follows up with Dr. Aravind Ramos as outpatient  F/u with urine and blood culture- NTD  LA 2.7  S/p 2  ltrs bolus IVF in ER  Repeat LA 1.5  IV Rocephin  Urology consulted, L nephrostomy tomorrow    2- Acute kidney injury on CKD-2, improved to baseline  3- Acute encephalopathy, due to #1,  resolved  4- HTN: controlled  5- Hx of Anxiety/depression, CABG. Stable. Dispo: rehab, as d/w patient  PPD ordered    subj      68 y.o. 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 Pt is alert, awake reports feeling somewhat better after IV fluids in ER. He reports feeling weak, and having a fall as he felt extremely weak in his leg. He denies chest pain, SOB, cough, but does report mild abdominal discomfort in around ileal conduit site. Pt denies nausea/vomiting, fever, chills, diarrhea. ER work up remarkable for Cr 1.74, UA with 4+ bacteriuria, LA 2.7, A1c 6.0. CT head and CXR unremarkable.     Today, feels better, afebrile, some loose stools    Objective:     Patient Vitals for the past 24 hrs:   Temp Pulse Resp BP SpO2   21 0716 98.2 °F (36.8 °C) (!) 59 18 136/70 98 %   21 0433 98.8 °F (37.1 °C) 69 18 121/73 98 %   21 0018 99.5 °F (37.5 °C) 60 18 110/67 94 %   21 2018 99.7 °F (37.6 °C) 60 18 111/60 98 %   21 1523 98.2 °F (36.8 °C) 61 18 (!) 108/57 95 %   21 1120 98.4 °F (36.9 °C) (!) 59 18 (!) 102/59 97 %       Oxygen Therapy  O2 Sat (%): 98 % (06/20/21 0716)  Pulse via Oximetry: 66 beats per minute (06/18/21 1659)  O2 Device: None (Room air) (06/18/21 1533)    Body mass index is 25.4 kg/m². Physical Exam:    General:  Alert, awake, elderly, mild distress  Lungs:  Clear to auscultation bilaterally   CV:   Regular rate and rhythm with normal S1 and S2   Abdomen:  Soft, nondistended, normoactive bowel sounds, ileal conduit site with bag filled with clear urine with mucus around the stoma, no bleeding noted. Extremities:  No cyanosis clubbing or edema   Neuro:  Grossly intact  Psych:  AO x3, mood and affect appropriate      Data Reviewed: I have reviewed all labs, meds, and studies. Recent Results (from the past 24 hour(s))   PTT    Collection Time: 06/19/21  4:08 PM   Result Value Ref Range    aPTT 34.6 24.1 - 35.1 SEC   PROTHROMBIN TIME + INR    Collection Time: 06/19/21  4:08 PM   Result Value Ref Range    Prothrombin time 14.7 12.5 - 14.7 sec    INR 1.1         EKG Results     Procedure 720 Value Units Date/Time    EKG, 12 LEAD, INITIAL [[id number]] Collected: 06/18/21 1404    Order Status: Completed Updated: 06/18/21 1826     Ventricular Rate 78 BPM      Atrial Rate 78 BPM      P-R Interval 192 ms      QRS Duration 102 ms      Q-T Interval 366 ms      QTC Calculation (Bezet) 417 ms      Calculated P Axis 78 degrees      Calculated R Axis 58 degrees      Calculated T Axis 83 degrees      Diagnosis --     !! AGE AND GENDER SPECIFIC ECG ANALYSIS !! Sinus rhythm with Premature atrial complexes  Incomplete right bundle branch block  Borderline ECG  When compared with ECG of 16-JAN-2011 09:18,  Premature atrial complexes are now Present  Vent.  rate has increased BY  33 BPM  Confirmed by [facility]  MD (), Tim Gomez ([id number]) on 6/18/2021 6:26:06 PM            All Micro Results     Procedure Component Value Units Date/Time    CULTURE, URINE [[id number]]  (Abnormal) Collected: 06/18/21 1404    Order Status: Completed Specimen: Urine Updated: 06/20/21 6560 Special Requests: NO SPECIAL REQUESTS        Culture result:       >100,000 COLONIES/mL GRAM NEGATIVE RODS IDENTIFICATION AND SUSCEPTIBILITY TO FOLLOW                  CULTURE IN PROGRESS,FURTHER UPDATES TO FOLLOW          BLOOD CULTURE [[id number]] Collected: 06/18/21 1355    Order Status: Completed Specimen: Blood Updated: 06/20/21 0650     Special Requests: --        NO SPECIAL REQUESTS  RIGHT  Antecubital       Culture result: NO GROWTH 2 DAYS       BLOOD CULTURE [[id number]] Collected: 06/18/21 1628    Order Status: Completed Specimen: Blood Updated: 06/20/21 0650     Special Requests: --        NO SPECIAL REQUESTS  LEFT  Antecubital       Culture result: NO GROWTH 2 DAYS             Other Studies:  CT ABD/PEL FOR RENAL STONE    Result Date: 6/19/2021  CT ABDOMEN AND PELVIS FOR STONES WITHOUT CONTRAST INDICATION:  Ileal conduit and history of urinary tract calculi. Multiple axial images were obtained through the abdomen and pelvis without intravenous or oral contrast.  Radiation dose reduction techniques were used for this study: All CT scans performed at this facility use one or all of the following: Automated exposure control, adjustment of the mA and/or kVp according to patient's size, iterative reconstruction. COMPARISON: None FINDINGS: -KIDNEYS: No radiopaque urinary tract calculi in the right kidney or left kidney. Moderately severe left hydronephrosis. Perinephric stranding densities on the left. -URETERS: Left hydroureter. There are multiple small stones in the abdomen in the lower left ureter, right of midline. No significant right hydroureter. There is a parastomal hernia containing bowel loops adjacent to the urostomy. -URINARY BLADDER: Absent -REPRODUCTIVE ORGANS: Absent. -OTHER: One small gallstone. Liver is unremarkable. Biliary tree is not dilated. Small hiatal hernia. Spleen is unremarkable. Bowel loops are not dilated.      Moderately severe left hydroureteronephrosis with multiple, (at least 5) small, 2-5 mm stones in tandem in the lower left ureter. Resting in the left, status post cystectomy with a parastomal hernia.          Medications:  Medications Administered       acetaminophen (TYLENOL) tablet 650 mg       Admin Date  03/13/2021 Action  Given Dose  650 mg Route  Oral Administered By  Jennifer Mims RN              cefTRIAXone (ROCEPHIN) 1 g in 0.9% sodium chloride (MBP/ADV) 50 mL MBP       Admin Date  03/13/2021 Action  New Bag Dose  1 g Rate  100 mL/hr Route  IntraVENous Administered By  Anju Galan RN              cefTRIAXone (ROCEPHIN) 2 g in 0.9% sodium chloride (MBP/ADV) 50 mL MBP       Admin Date  03/13/2021 Action  New Bag Dose  2 g Rate  100 mL/hr Route  IntraVENous Administered By  Mendez Almanza RN               Admin Date  03/14/2021 Action  New Bag Dose  2 g Rate  100 mL/hr Route  IntraVENous Administered By  Will Brown RN               Admin Date  03/15/2021 Action  New Bag Dose  2 g Rate  100 mL/hr Route  IntraVENous Administered By  Will Brown RN               Admin Date  03/16/2021 Action  New Bag Dose  2 g Rate  100 mL/hr Route  IntraVENous Administered By  Karlee Jama              diphenhydrAMINE (BENADRYL) injection 25 mg       Admin Date  03/14/2021 Action  Given Dose  25 mg Route  IntraVENous Administered By  Kin Mayes RN              doxycycline (VIBRAMYCIN) 100 mg in 0.9% sodium chloride (MBP/ADV) 100 mL MBP       Admin Date  03/13/2021 Action  New Bag Dose  100 mg Rate  100 mL/hr Route  IntraVENous Administered By  Mendez Almanza RN               Admin Date  03/13/2021 Action  New Bag Dose  100 mg Rate  100 mL/hr Route  IntraVENous Administered By  Kin Mayes RN               Admin Date  03/14/2021 Action  New Bag Dose  100 mg Rate  100 mL/hr Route  IntraVENous Administered By  Will Brown RN               Admin Date  03/14/2021 Action  New Bag Dose  100 mg Rate  100 mL/hr Route  IntraVENous Administered By  Kin Mayes RN Admin Date  03/15/2021 Action  New Bag Dose  100 mg Rate  100 mL/hr Route  IntraVENous Administered By  Michelle Hernandez RN              enoxaparin (LOVENOX) injection 40 mg       Admin Date  03/13/2021 Action  Given Dose  40 mg Route  SubCUTAneous Administered By  Marleni Rivera RN               Admin Date  03/13/2021 Action  Given Dose  40 mg Route  SubCUTAneous Administered By  Lorrie Pereira RN               Admin Date  03/14/2021 Action  Given Dose  40 mg Route  SubCUTAneous Administered By  Michelle Hernandez RN               Admin Date  03/14/2021 Action  Given Dose  40 mg Route  SubCUTAneous Administered By  Lorrie Pereira RN               Admin Date  03/15/2021 Action  Given Dose  40 mg Route  SubCUTAneous Administered By  Michelle Hernandez RN               Admin Date  03/15/2021 Action  Given Dose  40 mg Route  SubCUTAneous Administered By  Katherin Leiva RN               Admin Date  03/16/2021 Action  Given Dose  40 mg Route  SubCUTAneous Administered By  Yvrose Euceda              fentaNYL citrate (PF) injection 50 mcg       Admin Date  03/13/2021 Action  Given Dose  50 mcg Route  IntraVENous Administered By  Marleni Rivera RN               Admin Date  03/14/2021 Action  Given Dose  50 mcg Route  IntraVENous Administered By  Lorrie Pereira RN              ferrous sulfate tablet 325 mg       Admin Date  03/14/2021 Action  Given Dose  325 mg Route  Oral Administered By  Michelle Hernandez RN               Admin Date  03/14/2021 Action  Given Dose  325 mg Route  Oral Administered By  Michelle Hernandez RN               Admin Date  03/15/2021 Action  Given Dose  325 mg Route  Oral Administered By  Michelle Hernandez RN              folic acid (FOLVITE) tablet 1 mg       Admin Date  03/14/2021 Action  Given Dose  1 mg Route  Oral Administered By  Michelle Hernandez RN               Admin Date  03/15/2021 Action  Given Dose  1 mg Route  Oral Administered By  Michelle Hernandez RN               Admin Date  03/16/2021 Action  Given Dose  1 mg Route  Oral Administered By  Soraya Gilliam              furosemide (LASIX) injection 20 mg       Admin Date  03/15/2021 Action  Given Dose  20 mg Route  IntraVENous Administered By  Kaleb Stratton RN              furosemide (LASIX) injection 40 mg       Admin Date  03/13/2021 Action  Given Dose  40 mg Route  IntraVENous Administered By  Jojo Miranda RN               Admin Date  03/13/2021 Action  Given Dose  40 mg Route  IntraVENous Administered By  Peter Benitez RN               Admin Date  03/14/2021 Action  Given Dose  40 mg Route  IntraVENous Administered By  Kaleb Stratton RN               Admin Date  03/14/2021 Action  Given Dose  40 mg Route  IntraVENous Administered By  Peter Benitez RN               Admin Date  03/15/2021 Action  Given Dose  40 mg Route  IntraVENous Administered By  Arpita Royal RN               Admin Date  03/16/2021 Action  Given Dose  40 mg Route  IntraVENous Administered By  Soraya Gilliam              HYDROcodone-acetaminophen [facility]) 5-325 mg per tablet 1 Tab       Admin Date  03/14/2021 Action  Given Dose  1 Tab Route  Oral Administered By  Kaleb Stratton RN               Admin Date  03/15/2021 Action  Given Dose  1 Tab Route  Oral Administered By  Peter Benitez RN              insulin lispro (HUMALOG) injection       Admin Date  03/13/2021 Action  Given Dose  2 Units Route  SubCUTAneous Administered By  Peter Benitez RN               Admin Date  03/14/2021 Action  Given Dose  2 Units Route  SubCUTAneous Administered By  Kaleb Srtatton RN               Admin Date  03/14/2021 Action  Given Dose  2 Units Route  SubCUTAneous Administered By  Kaleb Stratton RN               Admin Date  03/14/2021 Action  Given Dose  4 Units Route  SubCUTAneous Administered By  Peter Benitez RN               Admin Date  03/15/2021 Action  Given Dose  2 Units Route  SubCUTAneous Administered By  Kaleb Stratton RN               Admin Date  03/15/2021 Action  Given Dose  4 Units Route  SubCUTAneous Administered By  Michelle Hernandez RN               Admin Date  03/15/2021 Action  Given Dose  2 Units Route  SubCUTAneous Administered By  Katherin Leiva RN               Admin Date  03/16/2021 Action  Given Dose  4 Units Route  SubCUTAneous Administered By  Yvrose Euceda              levothyroxine (SYNTHROID) tablet 137 mcg       Admin Date  03/14/2021 Action  Given Dose  137 mcg Route  Oral Administered By  Michelle Hernandez RN               Admin Date  03/15/2021 Action  Given Dose  137 mcg Route  Oral Administered By  Michelle Hernandez RN               Admin Date  03/16/2021 Action  Given Dose  137 mcg Route  Oral Administered By  Katherin Leiva RN              midodrine (PROAMATINE) tablet 10 mg       Admin Date  03/14/2021 Action  Given Dose  10 mg Route  Oral Administered By  Michelle Hernandez RN               Admin Date  03/14/2021 Action  Given Dose  10 mg Route  Oral Administered By  Michelle Hernandez RN               Admin Date  03/15/2021 Action  Given Dose  10 mg Route  Oral Administered By  Michelle Hernandez RN               Admin Date  03/15/2021 Action  Given Dose  10 mg Route  Oral Administered By  Michelle Hernandez RN               Admin Date  03/15/2021 Action  Given Dose  10 mg Route  Oral Administered By  Michelle Hernandez RN               Admin Date  03/16/2021 Action  Given Dose  10 mg Route  Oral Administered By  Alyssa Hooker Date  03/16/2021 Action  Given Dose  10 mg Route  Oral Administered By  Alyssa Hooker Date  03/16/2021 Action  Given Dose  10 mg Route  Oral Administered By  Yvrose Euceda              naloxone [facility]) injection 2 mg       Admin Date  03/13/2021 Action  Given Dose  2 mg Route  IntraVENous Administered By  Devan Avila RN              NOREPINephrine (LEVOPHED) 4 mg in 5% dextrose 250 mL infusion       Admin Date  03/14/2021 Action  New Bag Dose  4 mcg/min Rate  15 mL/hr Route  IntraVENous Administered By  Emma Mckinley RN               Admin Date  03/14/2021 Action  Rate Change Dose  6 mcg/min Rate  22.5 mL/hr Route  IntraVENous Administered By  Emma Mckinley RN               Admin Date  03/14/2021 Action  Rate Change Dose  4 mcg/min Rate  15 mL/hr Route  IntraVENous Administered By  Emma Mckinley RN               Admin Date  03/14/2021 Action  Rate Change Dose  2 mcg/min Rate  7.5 mL/hr Route  IntraVENous Administered By  Emma Mckinley RN               Admin Date  03/14/2021 Action  Rate Change Dose  1 mcg/min Rate  3.8 mL/hr Route  IntraVENous Administered By  Emma Mckinley RN               Admin Date  03/14/2021 Action  Restarted Dose  2 mcg/min Rate  7.5 mL/hr Route  IntraVENous Administered By  Emma Mckinley RN               Admin Date  03/14/2021 Action  Rate Change Dose  4 mcg/min Rate  15 mL/hr Route  IntraVENous Administered By  Emma Mckinley RN               Admin Date  03/15/2021 Action  Rate Change Dose  2 mcg/min Rate  7.5 mL/hr Route  IntraVENous Administered By  Anel Barnes RN              ondansetron [facility]) injection 4 mg       Admin Date  03/13/2021 Action  Given Dose  4 mg Route  IntraVENous Administered By  Jada Hyman RN              pantoprazole (PROTONIX) tablet 40 mg       Admin Date  03/14/2021 Action  Given Dose  40 mg Route  Oral Administered By  Emma Mckinley RN               Admin Date  03/15/2021 Action  Given Dose  40 mg Route  Oral Administered By  Emma Mckinley RN               Admin Date  03/16/2021 Action  Given Dose  40 mg Route  Oral Administered By  Naveed Quiroz              perflutren lipid microspheres (DEFINITY) in NS bolus IV       Admin Date  03/13/2021 Action  Given Dose  1 mL Route  IntraVENous Administered By  CAROLINA Byrd              potassium chloride (K-DUR, KLOR-CON) SR tablet 40 mEq       Admin Date  03/15/2021 Action  Given Dose  40 mEq Route  Oral Administered By  Emma Mckinley RN               Admin Date  03/16/2021 Action  Given Dose  40 mEq Route  Oral Administered By  Osyoly Pretty              potassium chloride 40 mEq IVPB       Admin Date  03/15/2021 Action  New Bag Dose  40 mEq Rate  25 mL/hr Route  IntraVENous Administered By  Talisha Montalvo RN              pregabalin (LYRICA) capsule 300 mg       Admin Date  03/14/2021 Action  Given Dose  300 mg Route  Oral Administered By  Marleny Atkinson, RN               Admin Date  03/14/2021 Action  Given Dose  300 mg Route  Oral Administered By  Marleny Atkinson, RN               Admin Date  03/15/2021 Action  Given Dose  300 mg Route  Oral Administered By  Marleny Atkinson, RN               Admin Date  03/15/2021 Action  Given Dose  300 mg Route  Oral Administered By  Anton Cordova, RN               Admin Date  03/16/2021 Action  Given Dose  300 mg Route  Oral Administered By  Zachary Pretty              sertraline (ZOLOFT) tablet 300 mg       Admin Date  03/14/2021 Action  Given Dose  300 mg Route  Oral Administered By  Marleny Atkinson, RN               Admin Date  03/15/2021 Action  Given Dose  300 mg Route  Oral Administered By  Marleny Atkinson, RN               Admin Date  03/16/2021 Action  Given Dose  300 mg Route  Oral Administered By  Zachary Pretty              sodium chloride (NS) flush 5-40 mL       Admin Date  03/13/2021 Action  Given Dose  10 mL Route  IntraVENous Administered By  Miki Murray, RN               Admin Date  03/13/2021 Action  Given Dose  30 mL Route  IntraVENous Administered By  Miki Murray, RN               Admin Date  03/13/2021 Action  Given Dose  40 mL Route  IntraVENous Administered By  Talisha Montalvo, RN               Admin Date  03/14/2021 Action  Given Dose  40 mL Route  IntraVENous Administered By  Talisha Montalvo, RN               Admin Date  03/14/2021 Action  Given Dose  10 mL Route  IntraVENous Administered By  Marleny Atkinson, RN               Admin Date  03/14/2021 Action  Given Dose  40 mL Route  IntraVENous Administered By  Debo Daigle RN               Admin Date  03/15/2021 Action  Given Dose  40 mL Route  IntraVENous Administered By  Debo Daigle RN               Admin Date  03/15/2021 Action  Given Dose  10 mL Route  IntraVENous Administered By  Yrn Álvarez RN               Admin Date  03/15/2021 Action  Given Dose  10 mL Route  IntraVENous Administered By  Rashmi Healy, JOSHUA               Admin Date  03/16/2021 Action  Given Dose  10 mL Route  IntraVENous Administered By  Tianna Armstrong              sodium chloride 0.9 % bolus infusion 250 mL       Admin Date  03/14/2021 Action  New Bag Dose  250 mL Rate  250 mL/hr Route  IntraVENous Administered By  Yrn Álvarez RN              tiZANidine (ZANAFLEX) tablet 4 mg       Admin Date  03/14/2021 Action  Given Dose  4 mg Route  Oral Administered By  Debo Daigle RN               Admin Date  03/14/2021 Action  Given Dose  4 mg Route  Oral Administered By  Yrn Álvarez RN               Admin Date  03/14/2021 Action  Given Dose  4 mg Route  Oral Administered By  Yrn Álvarez RN               Admin Date  03/14/2021 Action  Given Dose  4 mg Route  Oral Administered By  Debo Daigle RN               Admin Date  03/15/2021 Action  Given Dose  4 mg Route  Oral Administered By  Yrn Álvarez RN               Admin Date  03/15/2021 Action  Given Dose  4 mg Route  Oral Administered By  Yrn Álvarez RN               Admin Date  03/15/2021 Action  Given Dose  4 mg Route  Oral Administered By  Rashmi Healy RN               Admin Date  03/16/2021 Action  Given Dose  4 mg Route  Oral Administered By  Augustina Ferrari Date  03/16/2021 Action  Given Dose  4 mg Route  Oral Administered By  Tianna Armstrong              traZODone (DESYREL) tablet 150 mg       Admin Date  03/14/2021 Action  Given Dose  150 mg Route  Oral Administered By  Debo Daigle RN               Admin Date  03/15/2021 Action  Given Dose  150 mg Route  Oral Administered By  Nicolette Dumont RN              tuberculin injection 5 Units       Admin Date  03/15/2021 Action  Give PPD Dose  5 Units Route  IntraDERMal Administered By  Ulisses Mena RN              vancomycin (VANCOCIN) 2500 mg in  mL infusion       Admin Date  03/13/2021 Action  Given Dose  2,500 mg Rate  200 mL/hr Route  IntraVENous Administered By  Carmelo Youngblood RN              zonisamide (ZONEGRAN) capsule 300 mg       Admin Date  03/14/2021 Action  Given Dose  300 mg Route  Oral Administered By  Aidan Hernandez RN               Admin Date  03/15/2021 Action  Given Dose  300 mg Route  Oral Administered By  Nicolette Dumont RN                        Problem List:     Hospital Problems as of 6/20/2021 Date Reviewed: 5/20/2021        Codes Class Noted - Resolved POA    Urinary tract infection ICD-10-CM: N39.0  ICD-9-CM: 599.0  6/18/2021 - Present Unknown        * (Principal) Severe sepsis ([facility] ICD-10-CM: A41.9, R65.20  ICD-9-CM: 038.9, 995.92  6/18/2021 - Present Unknown        Acute encephalopathy ICD-10-CM: G93.40  ICD-9-CM: 348.30  6/18/2021 - Present Unknown        Stage 3a chronic kidney disease ([facility] ICD-10-CM: N18.31  ICD-9-CM: 894. 3  Unknown - Present Yes        Major depressive disorder with single episode, in full remission ([facility] ICD-10-CM: F32.5  ICD-9-CM: 296.26  Unknown - Present Yes        Essential hypertension ICD-10-CM: I10  ICD-9-CM: 401.9  7/31/2017 - Present Yes        Mixed hyperlipidemia ICD-10-CM: E78.2  ICD-9-CM: 272.2  7/31/2017 - Present Yes        Coronary artery disease involving coronary bypass graft of native heart without angina pectoris ICD-10-CM: I25.810  ICD-9-CM: 414.05  11/6/2015 - Present Yes                 Signed By: Ramesh Flowers MD   [facility]    June 20, 2021  4:22 PM

## 2021-06-20 NOTE — PROGRESS NOTES
Urology Progress Note    Admit Date: 6/18/2021    Subjective:     Patient has no new complaints this AM.  Afebrile. Pain controlled. CT A/P shows severe L hydronephrosis with 5 distal ureteral stones obstructing near ileal conduit. Remains on IV Rocephin      Urine culture with GNR prelim. Objective:     Patient Vitals for the past 8 hrs:   BP Temp Pulse Resp SpO2   06/20/21 0018 110/67 99.5 °F (37.5 °C) 60 18 94 %     No intake/output data recorded. 06/18 0701 - 06/19 1900  In: 2530 [P.O.:480; I.V.:2050]  Out: 150     Physical Exam:   Visit Vitals  /67 (BP 1 Location: Left upper arm, BP Patient Position: At rest)   Pulse 60   Temp 99.5 °F (37.5 °C)   Resp 18   Ht 5' 10\" (1.778 m)   Wt 177 lb (80.3 kg)   SpO2 94%   BMI 25.40 kg/m²        GENERAL: No acute distress, Awake, Alert, Oriented X 3  CARDIAC: regular rate and rhythm  CHEST AND LUNG: Easy work of breathing  ABDOMEN: soft, non tender, non-distended, ileal conduit pink/patent producing clear yellow urine       Data Review   Recent Results (from the past 24 hour(s))   PTT    Collection Time: 06/19/21  4:08 PM   Result Value Ref Range    aPTT 34.6 24.1 - 35.1 SEC   PROTHROMBIN TIME + INR    Collection Time: 06/19/21  4:08 PM   Result Value Ref Range    Prothrombin time 14.7 12.5 - 14.7 sec    INR 1.1             Assessment:     Principal Problem:    Severe sepsis ([address] (6/18/2021)    Active Problems:    Coronary artery disease involving coronary bypass graft of native heart without angina pectoris (11/6/2015)      Essential hypertension (7/31/2017)      Mixed hyperlipidemia (7/31/2017)      Major depressive disorder with single episode, in full remission (HCC) ()      Stage 3a chronic kidney disease (HCC) ()      Urinary tract infection (6/18/2021)      Acute encephalopathy (6/18/2021)      68year old male with sepsis due to UTI, severe L hydro-ureteronephrosis and obstructing 5 distal L ureteral stones.   Clinically stable on IV antibiotics    Plan:     -IR planning for L NT placement tomorrow. Spoke with IR Physician on call  -OK for diet today and make NPO at midnight for IR procedure tomorrow  -Continue antibiotics  -Follow up cultures  -Will follow       Guy Anguiaon M.D.     [facility]  [facility], [address]  Phone: [phone]  Fax: [phone]

## 2021-06-21 ENCOUNTER — HOME HEALTH ADMISSION (OUTPATIENT)
Dept: HOME HEALTH SERVICES | Facility: HOME HEALTH | Age: 81
End: 2021-06-21
Payer: MEDICARE

## 2021-06-21 ENCOUNTER — APPOINTMENT (OUTPATIENT)
Dept: INTERVENTIONAL RADIOLOGY/VASCULAR | Age: 81
DRG: 871 | End: 2021-06-21
Attending: UROLOGY
Payer: MEDICARE

## 2021-06-21 LAB
ACC. NO. FROM MICRO ORDER, ACCP: ABNORMAL
ANION GAP SERPL CALC-SCNC: 5 MMOL/L (ref 7–16)
BACTERIA SPEC CULT: ABNORMAL
BASOPHILS # BLD: 0 K/UL (ref 0–0.2)
BASOPHILS NFR BLD: 0 % (ref 0–2)
BUN SERPL-MCNC: 19 MG/DL (ref 8–23)
CALCIUM SERPL-MCNC: 8.5 MG/DL (ref 8.3–10.4)
CHLORIDE SERPL-SCNC: 110 MMOL/L (ref 98–107)
CO2 SERPL-SCNC: 26 MMOL/L (ref 21–32)
CREAT SERPL-MCNC: 1.34 MG/DL (ref 0.8–1.5)
DIFFERENTIAL METHOD BLD: ABNORMAL
EOSINOPHIL # BLD: 0.1 K/UL (ref 0–0.8)
EOSINOPHIL NFR BLD: 2 % (ref 0.5–7.8)
ERYTHROCYTE [DISTWIDTH] IN BLOOD BY AUTOMATED COUNT: 13.8 % (ref 11.9–14.6)
ESCHERICHIA COLI: DETECTED
GLUCOSE SERPL-MCNC: 86 MG/DL (ref 65–100)
HCT VFR BLD AUTO: 33.1 % (ref 41.1–50.3)
HGB BLD-MCNC: 10.9 G/DL (ref 13.6–17.2)
IMM GRANULOCYTES # BLD AUTO: 0 K/UL (ref 0–0.5)
IMM GRANULOCYTES NFR BLD AUTO: 0 % (ref 0–5)
INTERPRETATION: ABNORMAL
KPC (CARBAPENEM RESISTANCE GENE): NOT DETECTED
LYMPHOCYTES # BLD: 1.3 K/UL (ref 0.5–4.6)
LYMPHOCYTES NFR BLD: 27 % (ref 13–44)
MCH RBC QN AUTO: 31 PG (ref 26.1–32.9)
MCHC RBC AUTO-ENTMCNC: 32.9 G/DL (ref 31.4–35)
MCV RBC AUTO: 94 FL (ref 79.6–97.8)
MM INDURATION POC: 0 MM (ref 0–5)
MONOCYTES # BLD: 0.5 K/UL (ref 0.1–1.3)
MONOCYTES NFR BLD: 10 % (ref 4–12)
NEUTS SEG # BLD: 3 K/UL (ref 1.7–8.2)
NEUTS SEG NFR BLD: 61 % (ref 43–78)
NRBC # BLD: 0 K/UL (ref 0–0.2)
PLATELET # BLD AUTO: 126 K/UL (ref 150–450)
PMV BLD AUTO: 10 FL (ref 9.4–12.3)
POTASSIUM SERPL-SCNC: 3.4 MMOL/L (ref 3.5–5.1)
PPD POC: NEGATIVE NEGATIVE
RBC # BLD AUTO: 3.52 M/UL (ref 4.23–5.6)
SERVICE CMNT-IMP: ABNORMAL
SODIUM SERPL-SCNC: 141 MMOL/L (ref 138–145)
WBC # BLD AUTO: 5 K/UL (ref 4.3–11.1)

## 2021-06-21 PROCEDURE — 87086 URINE CULTURE/COLONY COUNT: CPT

## 2021-06-21 PROCEDURE — C1769 GUIDE WIRE: HCPCS

## 2021-06-21 PROCEDURE — 77030002916 HC SUT ETHLN J&J -A

## 2021-06-21 PROCEDURE — 85025 COMPLETE CBC W/AUTO DIFF WBC: CPT

## 2021-06-21 PROCEDURE — 74011000636 HC RX REV CODE- 636: Performed by: RADIOLOGY

## 2021-06-21 PROCEDURE — 74011250637 HC RX REV CODE- 250/637: Performed by: HOSPITALIST

## 2021-06-21 PROCEDURE — C1729 CATH, DRAINAGE: HCPCS

## 2021-06-21 PROCEDURE — 99231 SBSQ HOSP IP/OBS SF/LOW 25: CPT | Performed by: NURSE PRACTITIONER

## 2021-06-21 PROCEDURE — 80048 BASIC METABOLIC PNL TOTAL CA: CPT

## 2021-06-21 PROCEDURE — 77030025702 HC BG URIN DRN MRTM -A

## 2021-06-21 PROCEDURE — 97535 SELF CARE MNGMENT TRAINING: CPT

## 2021-06-21 PROCEDURE — 74011000250 HC RX REV CODE- 250: Performed by: RADIOLOGY

## 2021-06-21 PROCEDURE — 74011250636 HC RX REV CODE- 250/636: Performed by: RADIOLOGY

## 2021-06-21 PROCEDURE — 0T9430Z DRAINAGE OF LEFT KIDNEY PELVIS WITH DRAINAGE DEVICE, PERCUTANEOUS APPROACH: ICD-10-PCS | Performed by: RADIOLOGY

## 2021-06-21 PROCEDURE — 36415 COLL VENOUS BLD VENIPUNCTURE: CPT

## 2021-06-21 PROCEDURE — 2709999900 HC NON-CHARGEABLE SUPPLY

## 2021-06-21 PROCEDURE — 99152 MOD SED SAME PHYS/QHP 5/>YRS: CPT

## 2021-06-21 PROCEDURE — 97530 THERAPEUTIC ACTIVITIES: CPT

## 2021-06-21 PROCEDURE — 74011250636 HC RX REV CODE- 250/636: Performed by: HOSPITALIST

## 2021-06-21 PROCEDURE — 74011000258 HC RX REV CODE- 258: Performed by: HOSPITALIST

## 2021-06-21 PROCEDURE — 65270000029 HC RM PRIVATE

## 2021-06-21 PROCEDURE — 87040 BLOOD CULTURE FOR BACTERIA: CPT

## 2021-06-21 PROCEDURE — 74011250637 HC RX REV CODE- 250/637: Performed by: UROLOGY

## 2021-06-21 RX ORDER — SODIUM CHLORIDE 9 MG/ML
25 INJECTION, SOLUTION INTRAVENOUS ONCE
Status: COMPLETED | OUTPATIENT
Start: 2021-06-21 | End: 2021-06-21

## 2021-06-21 RX ORDER — FENTANYL CITRATE 50 UG/ML
25-100 INJECTION, SOLUTION INTRAMUSCULAR; INTRAVENOUS
Status: DISCONTINUED | OUTPATIENT
Start: 2021-06-21 | End: 2021-06-21

## 2021-06-21 RX ORDER — LIDOCAINE HYDROCHLORIDE 20 MG/ML
20-300 INJECTION, SOLUTION INFILTRATION; PERINEURAL
Status: DISCONTINUED | OUTPATIENT
Start: 2021-06-21 | End: 2021-06-22 | Stop reason: HOSPADM

## 2021-06-21 RX ORDER — MIDAZOLAM HYDROCHLORIDE 1 MG/ML
.5-2 INJECTION, SOLUTION INTRAMUSCULAR; INTRAVENOUS
Status: DISCONTINUED | OUTPATIENT
Start: 2021-06-21 | End: 2021-06-21

## 2021-06-21 RX ADMIN — FENTANYL CITRATE 50 MCG: 50 INJECTION, SOLUTION INTRAMUSCULAR; INTRAVENOUS at 09:27

## 2021-06-21 RX ADMIN — FLUOXETINE 20 MG: 10 CAPSULE ORAL at 07:46

## 2021-06-21 RX ADMIN — ASPIRIN 81 MG: 81 TABLET ORAL at 07:46

## 2021-06-21 RX ADMIN — Medication 10 ML: at 20:17

## 2021-06-21 RX ADMIN — FENTANYL CITRATE 50 MCG: 50 INJECTION, SOLUTION INTRAMUSCULAR; INTRAVENOUS at 09:22

## 2021-06-21 RX ADMIN — LIDOCAINE HYDROCHLORIDE 6 ML: 20 INJECTION, SOLUTION INFILTRATION; PERINEURAL at 09:28

## 2021-06-21 RX ADMIN — MIDAZOLAM 1 MG: 1 INJECTION INTRAMUSCULAR; INTRAVENOUS at 09:22

## 2021-06-21 RX ADMIN — IOPAMIDOL 6 ML: 612 INJECTION, SOLUTION INTRAVENOUS at 09:30

## 2021-06-21 RX ADMIN — MIDAZOLAM 1 MG: 1 INJECTION INTRAMUSCULAR; INTRAVENOUS at 09:27

## 2021-06-21 RX ADMIN — SODIUM CHLORIDE 25 ML/HR: 900 INJECTION, SOLUTION INTRAVENOUS at 09:17

## 2021-06-21 RX ADMIN — CEFTRIAXONE 2 G: 2 INJECTION, POWDER, FOR SOLUTION INTRAMUSCULAR; INTRAVENOUS at 15:37

## 2021-06-21 RX ADMIN — Medication 5 ML: at 11:34

## 2021-06-21 RX ADMIN — LOSARTAN POTASSIUM 50 MG: 50 TABLET, FILM COATED ORAL at 07:46

## 2021-06-21 RX ADMIN — FAMOTIDINE 20 MG: 20 TABLET ORAL at 07:46

## 2021-06-21 NOTE — PROGRESS NOTES
TRANSFER - OUT REPORT:    Verbal report given to [facility] RN (name) on Jose Raul Park  being transferred to [facility](unit) for routine progression of care       Report consisted of patients Situation, Background, Assessment and   Recommendations(SBAR). Information from the following report(s) SBAR, Procedure Summary and MAR was reviewed with the receiving nurse. Opportunity for questions and clarification was provided. Conscious Sedation:   100 Mcg of Fentanyl administered  2 Mg of Versed administered    Pt tolerated procedure well.      Visit Vitals  /69   Pulse (!) 59   Temp 98.8 °F (37.1 °C)   Resp 16   Ht 5' 10\" (1.778 m)   Wt 80.3 kg (177 lb)   SpO2 92%   BMI 25.40 kg/m²     Past Medical History:   Diagnosis Date    Acute encephalopathy 6/18/2021    Arthritis     OA- bilat knees    Bladder absent     stoma present    Bladder cancer (HCC)     CAD (coronary artery disease)     CABG x 4 in 2004 - no damage- emergenc CABG- Cath 1/2011- total occlusion ramus, total mid occlusion left anterior descending, 50-70% stenosis of mid right coronary artery- \"Normal LEF\"    Cancer ([facility] [address] 1/1/2008    bladder- chemo in bladder then 7/11 bladder removed    CRI (chronic renal insufficiency), stage 3 (moderate) (HCC)     Depression     Family history of diabetes mellitus     Heart murmur     mild mitral regurgitation    Hypercholesteremia     Hypertension     Infection     to s/p hernia wound 6/0430    Other complication of colostomy or enterostomy     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    Followed by surgeon Dr Jeanne Campos Unspecified intestinal obstruction     Urinary tract infection 6/18/2021    Ventral hernia, unspecified, without mention of obstruction or gangrene      Peripheral IV 06/18/21 Distal;Right (Active)   Site Assessment Clean, dry, & intact 06/21/21 0744   Phlebitis Assessment 0 06/21/21 0744   Infiltration Assessment 0 06/21/21 0744   Dressing Status Clean, dry, & intact 06/21/21 0744   Dressing Type Transparent 06/21/21 0744   Hub Color/Line Status Patent 06/21/21 0744       Peripheral IV 06/18/21 Left Antecubital (Active)   Site Assessment Clean, dry, & intact 06/21/21 0744   Phlebitis Assessment 0 06/21/21 0744   Infiltration Assessment 0 06/21/21 0744   Dressing Status Clean, dry, & intact 06/21/21 0744   Dressing Type Transparent 06/21/21 0744   Hub Color/Line Status Patent 06/21/21 0744                 Nephrostomy Tube 06/21/21 Flank (Active)

## 2021-06-21 NOTE — PROGRESS NOTES
Notified Dr. Domenic Hutton that patient's Anaerobic Blood Culture taken from the [facility] on the 18th, was showing gram negative Rods. Reema,Lab, also said that after further review it was E Coli.

## 2021-06-21 NOTE — PROGRESS NOTES
To IR room 1 via stretcher. Name and  verified. Transferred prone to table, secured and monitor attached.

## 2021-06-21 NOTE — PROGRESS NOTES
ACUTE OT GOALS:  (Developed with and agreed upon by patient and/or caregiver.)  1. Patient will complete lower body bathing and dressing with supervision and adaptive equipment as needed. 2. Patient will complete toileting with supervision . 3. Patient will complete grooming ADL with supervision  . 4. Patient will tolerate 23 minutes of OT treatment with 1-2 rest breaks to increase activity tolerance for ADLs. 5. Patient will complete functional transfers with supervision   and adaptive equipment as needed. 6. Patient will tolerate 10 minutes BUE exercises to increase strength for safe, functional transfers.      Timeframe: 7 visits     OCCUPATIONAL THERAPY: Daily Note OT Treatment Day # 2    Willard Ratliff is a 68 y.o. male   PRIMARY DIAGNOSIS: Severe sepsis ([facility] [address]  Urinary tract infection [N39.0]  Severe sepsis ([facility] [address] [A41.9, R65.20]  Acute encephalopathy [G93.40]       [place] / [place] / Product Type: Medicare /   ASSESSMENT:     REHAB RECOMMENDATIONS: CURRENT LEVEL OF FUNCTION:  (Most Recently Demonstrated)   Recommendation to date pending progress:  [address] [place] [address] vs. Short-term Rehab   Pt lives alone with very limited help available during the day--due to confusion and decreased safety awareness, pt would benefit from rehab stay until safe to d/c home alone  Equipment:    None--pt has all recommended equipment at this time (RW, TTB, SPC, and power scooter) Bathing:   Standby Assistance  Dressing:   Minimal Assistance  Feeding/Grooming:   Contact Guard Assistance  Toileting:   Not tested  Functional Mobility:   Contact Guard Assistance     ASSESSMENT:  Mr. Lanie Frankel is progressing well with OT services though still presents with confusion. Pt s/p L nephrostomy tube. Performed bed mobility CGA. Functional ambulation CGA. Grooming tasks CGA standing at sink, SBA sitting at sink. Decreased processing time.  Required VCs for sequencing due to confusion. Pt is oriented x4 but remained confused throughout conversation and simple tasks. Pt remains below baseline with decreased strength, coordination, balance, cognition, and activity tolerance for completion of ADLs/functional mobility. Continue with OT POC in order to address functional deficits. SUBJECTIVE:   Mr. Lester Garrett states, \"That's my girlfriend but I cant remember her name right now. \"    SOCIAL HISTORY/LIVING ENVIRONMENT: Pt lives alone in a one level home with a tub shower w/ TTB. Pt uses a RW for ambulation.  amily and girlfriend work throughout the day, unable to provide assistance/supervision during the day     OBJECTIVE:     PAIN: VITAL SIGNS: LINES/DRAINS:   Pre Treatment: Pain Screen  Pain Scale 1: Numeric (0 - 10)  Pain Intensity 1: 0  Post Treatment: no change    nephrostomy tube, urostomy   O2 Device: None (Room air)     ACTIVITIES OF DAILY LIVING: I Mod I S SBA CGA Min Mod Max Total NT Comments   BASIC ADLs:              Bathing/ Showering [] [] [] [x] [] [] [] [] [] [] Washed UB sitting at sink   Toileting [] [] [] [] [] [] [] [] [] [x]    Dressing [] [] [] [] [] [x] [] [] [] [] Donned/doffed gown    Feeding [] [] [] [] [] [] [] [] [] [x]    Grooming [] [] [] [] [x] [] [] [] [] [] Brushed teeth, washed face, and washed hair standing at sink    Personal Device Care [] [] [] [] [] [] [] [] [] [x]    Functional Mobility [] [] [] [] [x] [] [] [] [] [] Ambulated in room CGA w/ HHA   I=Independent, Mod I=Modified Independent, S=Supervision, SBA=Standby Assistance, CGA=Contact Guard Assistance,   Min=Minimal Assistance, Mod=Moderate Assistance, Max=Maximal Assistance, Total=Total Assistance, NT=Not Tested    MOBILITY: I Mod I S SBA CGA Min Mod Max Total  NT x2 Comments:   Supine to sit [] [] [] [] [x] [] [] [] [] [] []    Sit to supine [] [] [] [] [x] [] [] [] [] [] []    Sit to stand [] [] [] [] [x] [] [] [] [] [] []    Bed to chair [] [] [] [] [] [] [] [] [] [x] [] Ambulation in room CGA w/ HHA   I=Independent, Mod I=Modified Independent, S=Supervision, SBA=Standby Assistance, CGA=Contact Guard Assistance,   Min=Minimal Assistance, Mod=Moderate Assistance, Max=Maximal Assistance, Total=Total Assistance, NT=Not Tested    BALANCE: Good Fair+ Fair Fair- Poor NT Comments   Sitting Static [x] [] [] [] [] []    Sitting Dynamic [x] [] [] [] [] []              Standing Static [] [x] [] [] [] []    Standing Dynamic [] [x] [] [] [] []      PLAN:   FREQUENCY/DURATION: OT Plan of Care: 3 times/week for duration of hospital stay or until stated goals are met, whichever comes first.    TREATMENT:   TREATMENT:   ($$ Self Care/Home Management: 23-37 mins$$ Therapeutic Activity: 8-22 mins   )  Therapeutic Activity (15 Minutes): Therapeutic activity included Supine to Sit, Sit to Supine, Transfer Training, Ambulation on level ground, Sitting balance  and Standing balance to improve functional Mobility, Strength and Activity tolerance. Self Care (25 Minutes): Self care including Upper Body Bathing, Upper Body Dressing and Grooming to increase independence and decrease level of assistance required.     TREATMENT GRID:  N/A    AFTER TREATMENT POSITION/PRECAUTIONS:  Alarm Activated, Bed, Needs within reach and Visitors at bedside    INTERDISCIPLINARY COLLABORATION:  RN/PCT and OT/ERIC    TOTAL TREATMENT DURATION:  OT Patient Time In/Time Out  Time In: 1408  Time Out: [address], OT

## 2021-06-21 NOTE — PROGRESS NOTES
Patient's girlfriend Jacinto Turner at bedside for nephrostomy tube teaching. Teresa instructed on how to flush drain with 10ml NS, bag emptying, and dressing changes with return demonstration. Teresa verbalizes understanding. All questions answered.

## 2021-06-21 NOTE — PROGRESS NOTES
TRANSFER - OUT REPORT:    Verbal report given to Amanda(name) on China Choe  being transferred to IR recovery(unit) for routine progression of care       Report consisted of patients Situation, Background, Assessment and   Recommendations(SBAR). Information from the following report(s) SBAR, Procedure Summary and MAR was reviewed with the receiving nurse. Opportunity for questions and clarification was provided. Conscious Sedation:   100 Mcg of Fentanyl administered  2 Mg of Versed administered    Pt tolerated procedure well.      Visit Vitals  /69   Pulse (!) 59   Temp 98.8 °F (37.1 °C)   Resp 16   Ht 5' 10\" (1.778 m)   Wt 80.3 kg (177 lb)   SpO2 92%   BMI 25.40 kg/m²     Past Medical History:   Diagnosis Date    Acute encephalopathy 6/18/2021    Arthritis     OA- bilat knees    Bladder absent     stoma present    Bladder cancer (HCC)     CAD (coronary artery disease)     CABG x 4 in 2004 - no damage- emergenc CABG- Cath 1/2011- total occlusion ramus, total mid occlusion left anterior descending, 50-70% stenosis of mid right coronary artery- \"Normal LEF\"    Cancer ([facility] [address] 1/1/2008    bladder- chemo in bladder then 7/11 bladder removed    CRI (chronic renal insufficiency), stage 3 (moderate) (HCC)     Depression     Family history of diabetes mellitus     Heart murmur     mild mitral regurgitation    Hypercholesteremia     Hypertension     Infection     to s/p hernia wound 0/6883    Other complication of colostomy or enterostomy     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    Followed by surgeon Dr Mary Mohamud Unspecified intestinal obstruction     Urinary tract infection 6/18/2021    Ventral hernia, unspecified, without mention of obstruction or gangrene      Peripheral IV 06/18/21 Distal;Right (Active)   Site Assessment Clean, dry, & intact 06/21/21 0744   Phlebitis Assessment 0 06/21/21 0744   Infiltration Assessment 0 06/21/21 0744   Dressing Status Clean, dry, & intact 06/21/21 0744   Dressing Type Transparent 06/21/21 0744   Hub Color/Line Status Patent 06/21/21 0744       Peripheral IV 06/18/21 Left Antecubital (Active)   Site Assessment Clean, dry, & intact 06/21/21 0744   Phlebitis Assessment 0 06/21/21 0744   Infiltration Assessment 0 06/21/21 0744   Dressing Status Clean, dry, & intact 06/21/21 0744   Dressing Type Transparent 06/21/21 0744   Hub Color/Line Status Patent 06/21/21 0744                 Nephrostomy Tube 06/21/21 Flank (Active)

## 2021-06-21 NOTE — PROCEDURES
Department of Interventional Radiology  [phone]        Interventional Radiology Brief Procedure Note    Patient: Rachana Zamorano MRN: [id number]  SSN: [id number]    YOB: 1940  Age: 68 y.o. Sex: male      Date of Procedure: 6/21/2021    Pre-Procedure Diagnosis: hydronephrosis    Post-Procedure Diagnosis: SAME    Procedure(s): Percutaneous Nephrostomy Tube Placement    Brief Description of Procedure: as above    Performed By: Emelia Regalado MD     Assistants: None    Anesthesia:Moderate Sedation    Estimated Blood Loss: Less than 10ml    Specimens:  Microbiology    Implants:  Nephrostomy Drain    Findings: moderate left hydronephrosis.   10 F drain placed    Complications: None    Recommendations: external drainage     Follow Up: will await recommendations per urology and cultures    Signed By: Emelia Regalado MD     June 21, 2021

## 2021-06-21 NOTE — PROGRESS NOTES
Admit Date: 6/18/2021    Subjective:     Oumou Linares is resting, no complaints. Objective:     Patient Vitals for the past 8 hrs:   BP Temp Pulse Resp SpO2   06/21/21 0829 132/63 98.8 °F (37.1 °C) (!) 58 18 --   06/21/21 0744 (!) 143/73 98.5 °F (36.9 °C) 61 18 99 %   06/21/21 0418 (!) 150/79 98.6 °F (37 °C) 96 19 96 %     06/21 0701 - 06/21 1900  In: -   Out: 675 [Urine:675]  06/19 1901 - 06/21 0700  In: -   Out: 525 [Urine:325]    Physical Exam:  GENERAL: alert, cooperative, no distress  LUNG: clear to auscultation bilaterally  HEART: regular rate and rhythm, S1, S2   ABDOMEN: soft, non-tender  NEUROLOGIC: AOx3    Data Review   Recent Results (from the past 24 hour(s))   CBC WITH AUTOMATED DIFF    Collection Time: 06/21/21  4:41 AM   Result Value Ref Range    WBC 5.0 4.3 - 11.1 K/uL    RBC 3.52 (L) 4.23 - 5.6 M/uL    HGB 10.9 (L) 13.6 - 17.2 g/dL    HCT 33.1 (L) 41.1 - 50.3 %    MCV 94.0 79.6 - 97.8 FL    MCH 31.0 26.1 - 32.9 PG    MCHC 32.9 31.4 - 35.0 g/dL    RDW 13.8 11.9 - 14.6 %    PLATELET 576 (L) 031 - 450 K/uL    MPV 10.0 9.4 - 12.3 FL    ABSOLUTE NRBC 0.00 0.0 - 0.2 K/uL    DF AUTOMATED      NEUTROPHILS 61 43 - 78 %    LYMPHOCYTES 27 13 - 44 %    MONOCYTES 10 4.0 - 12.0 %    EOSINOPHILS 2 0.5 - 7.8 %    BASOPHILS 0 0.0 - 2.0 %    IMMATURE GRANULOCYTES 0 0.0 - 5.0 %    ABS. NEUTROPHILS 3.0 1.7 - 8.2 K/UL    ABS. LYMPHOCYTES 1.3 0.5 - 4.6 K/UL    ABS. MONOCYTES 0.5 0.1 - 1.3 K/UL    ABS. EOSINOPHILS 0.1 0.0 - 0.8 K/UL    ABS. BASOPHILS 0.0 0.0 - 0.2 K/UL    ABS. IMM.  GRANS. 0.0 0.0 - 0.5 K/UL   METABOLIC PANEL, BASIC    Collection Time: 06/21/21  4:41 AM   Result Value Ref Range    Sodium 141 138 - 145 mmol/L    Potassium 3.4 (L) 3.5 - 5.1 mmol/L    Chloride 110 (H) 98 - 107 mmol/L    CO2 26 21 - 32 mmol/L    Anion gap 5 (L) 7 - 16 mmol/L    Glucose 86 65 - 100 mg/dL    BUN 19 8 - 23 MG/DL    Creatinine 1.34 0.8 - 1.5 MG/DL    GFR est AA >60 >60 ml/min/1.73m2    GFR est non-AA 55 (L) >60 ml/min/1.73m2    Calcium 8.5 8.3 - 10.4 MG/DL       Assessment:     Principal Problem:    Severe sepsis ([facility] [address] (6/18/2021)    Active Problems:    Coronary artery disease involving coronary bypass graft of native heart without angina pectoris (11/6/2015)      Essential hypertension (7/31/2017)      Mixed hyperlipidemia (7/31/2017)      Major depressive disorder with single episode, in full remission ([facility] [address] ()      Stage 3a chronic kidney disease (HCC) ()      Urinary tract infection (6/18/2021)      Acute encephalopathy (6/18/2021)      68year old male with sepsis due to UTI, severe L hydro-ureteronephrosis and obstructing 5 distal L ureteral stones. Urine culture with klebsiella/E coli. Clinically stable on IV antibiotics. Plan: For left NT placement today in IR. Continue NPO. Continue abx, follow up cultures in process. Ivette Ac NP  [facility]  I have reviewed the above note and examined the patient. I agree with the exam, assessment and plan.     Jasmeet Montoya., MD

## 2021-06-21 NOTE — PROGRESS NOTES
Physician Progress Note      Kraig PEDRO #:                  [id number]  :                       1940  ADMIT DATE:       2021 1:42 PM  [address] [place] DATE:  RESPONDING  PROVIDER #:        Ashley HOOKS MD          QUERY TEXT:    Pt admitted with sepsis. Pt noted to have UTI and left hydronephrosis with 5 distal ureteral stones. If possible, please document in the progress notes and discharge summary if you are evaluating and/or treating any of the following: The medical record reflects the following:  Risk Factors: 68 yr, ileal conduit    Clinical Indicators: per documentation on  PN CT A/P shows severe L hydronephrosis with 5 distal ureteral stones obstructing near ileal conduit, urine culture  with E coli and klebsiella pneuomniae, CR on admission 1.74    Treatment: Left nephrostomy tube placed on , Urology consult, IR consult, IV Rocephin,  Options provided:  -- UTI due to severe left hydronephrosis with 5 distal ureteral stones  -- UTI not due to severe left hydronephrosis with 5 distal ureteral stoness  -- Other - I will add my own diagnosis  -- Disagree - Not applicable / Not valid  -- Disagree - Clinically unable to determine / Unknown  -- Refer to Clinical Documentation Reviewer    PROVIDER RESPONSE TEXT:    UTI is due severe left hydronephrosis with 5 distal ureteral stones.     Query created by: Aliya Nicholson on 2021 11:48 AM      Electronically signed by:  Ashley Abreu MD 2021 12:54 PM

## 2021-06-21 NOTE — PROGRESS NOTES
Hospitalist       Name:  Anna Love  Age:80 y.o. Sex:male   :  1940    MRN:  [id number]   PCP:  Quyen Barker MD      Admit Date:  2021  1:42 PM     Assessment & Plan:     1- Complicated UTI with sepsis, improving, urinary and blood cultures grew Klebsiella and E.coli. Seen by Urology. Pt is s/p ileal conduit since  following radical cystoprostatectomy. Has Percutaneous Nephrostomy Tube Placement on 21  2- Acute kidney injury on CKD-2, improved to baseline  3- Acute encephalopathy, due to #1,  resolved  4- Hypertnsion: controlled  5- Hx of Anxiety/depression, CABG. Stable. 6- Hypokalemia, replaced  7- Prediabetic    Rx:  Rocephin iv for 1-2 days more  BP control    Dispo: home health, pt change his mind about rehab    subj      68 y.o. 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 Pt is alert, awake reports feeling somewhat better after IV fluids in ER. He reports feeling weak, and having a fall as he felt extremely weak in his leg. He denies chest pain, SOB, cough, but does report mild abdominal discomfort in around ileal conduit site. Pt denies nausea/vomiting, fever, chills, diarrhea. ER work up remarkable for Cr 1.74, UA with 4+ bacteriuria, LA 2.7, A1c 6.0. CT head and CXR unremarkable.     Today, feels better, afebrile, doing well post-nephrostomy today    Objective:     Patient Vitals for the past 24 hrs:   Temp Pulse Resp BP SpO2   21 1024  (!) 52 16 (!) 119/57 91 %   21 1014  (!) 52 16 (!) 98/53 93 %   21 1003  (!) 59 16 131/83 91 %   21 0954  (!) 59 16 124/69 92 %   21 0943  68 14 129/70 99 %   21 0938  68 15 139/74 98 %   21 0933  65 14 122/73 98 %   21 0928  (!) 58 18 (!) 152/79 100 % 06/21/21 0923  62 20 (!) 169/78 100 %   06/21/21 0917  62 22 (!) 155/80 100 %   06/21/21 0829 98.8 °F (37.1 °C) (!) 58 18 132/63    06/21/21 0744 98.5 °F (36.9 °C) 61 18 (!) 143/73 99 %   06/21/21 0418 98.6 °F (37 °C) 96 19 (!) 150/79 96 %   06/21/21 0027 98.2 °F (36.8 °C) 69 18 116/62 97 %   06/20/21 2017 98.4 °F (36.9 °C) 66 17 (!) 148/76 98 %   06/20/21 1616 97.7 °F (36.5 °C) 62 18 124/64 98 %   06/20/21 1226 97.7 °F (36.5 °C) 65 18 (!) 122/53 98 %       Oxygen Therapy  O2 Sat (%): 91 % (06/21/21 1024)  Pulse via Oximetry: 52 beats per minute (06/21/21 1024)  O2 Device: None (Room air) (06/21/21 1024)  O2 Flow Rate (L/min): 3 l/min (06/21/21 0917)  ETCO2 (mmHg): 45 mmHg (06/21/21 0943)    Body mass index is 25.4 kg/m². Physical Exam:    General:  Alert, awake, elderly, mild distress  Lungs:  Clear to auscultation bilaterally   CV:   Regular rate and rhythm with normal S1 and S2   Abdomen:  Soft, nondistended, normoactive bowel sounds, ileal conduit site with bag filled with clear urine with mucus around the stoma, slightly bloody  Extremities:  No cyanosis clubbing or edema   Neuro:  Grossly intact  Psych:  AO x3, mood and affect appropriate      Data Reviewed: I have reviewed all labs, meds, and studies. Recent Results (from the past 24 hour(s))   CBC WITH AUTOMATED DIFF    Collection Time: 06/21/21  4:41 AM   Result Value Ref Range    WBC 5.0 4.3 - 11.1 K/uL    RBC 3.52 (L) 4.23 - 5.6 M/uL    HGB 10.9 (L) 13.6 - 17.2 g/dL    HCT 33.1 (L) 41.1 - 50.3 %    MCV 94.0 79.6 - 97.8 FL    MCH 31.0 26.1 - 32.9 PG    MCHC 32.9 31.4 - 35.0 g/dL    RDW 13.8 11.9 - 14.6 %    PLATELET 792 (L) 052 - 450 K/uL    MPV 10.0 9.4 - 12.3 FL    ABSOLUTE NRBC 0.00 0.0 - 0.2 K/uL    DF AUTOMATED      NEUTROPHILS 61 43 - 78 %    LYMPHOCYTES 27 13 - 44 %    MONOCYTES 10 4.0 - 12.0 %    EOSINOPHILS 2 0.5 - 7.8 %    BASOPHILS 0 0.0 - 2.0 %    IMMATURE GRANULOCYTES 0 0.0 - 5.0 %    ABS. NEUTROPHILS 3.0 1.7 - 8.2 K/UL    ABS. LYMPHOCYTES 1.3 0.5 - 4.6 K/UL    ABS. MONOCYTES 0.5 0.1 - 1.3 K/UL    ABS. EOSINOPHILS 0.1 0.0 - 0.8 K/UL    ABS. BASOPHILS 0.0 0.0 - 0.2 K/UL    ABS. IMM. GRANS. 0.0 0.0 - 0.5 K/UL   METABOLIC PANEL, BASIC    Collection Time: 06/21/21  4:41 AM   Result Value Ref Range    Sodium 141 138 - 145 mmol/L    Potassium 3.4 (L) 3.5 - 5.1 mmol/L    Chloride 110 (H) 98 - 107 mmol/L    CO2 26 21 - 32 mmol/L    Anion gap 5 (L) 7 - 16 mmol/L    Glucose 86 65 - 100 mg/dL    BUN 19 8 - 23 MG/DL    Creatinine 1.34 0.8 - 1.5 MG/DL    GFR est AA >60 >60 ml/min/1.73m2    GFR est non-AA 55 (L) >60 ml/min/1.73m2    Calcium 8.5 8.3 - 10.4 MG/DL       EKG Results     Procedure 720 Value Units Date/Time    EKG, 12 LEAD, INITIAL [[id number]] Collected: 06/18/21 1404    Order Status: Completed Updated: 06/18/21 1826     Ventricular Rate 78 BPM      Atrial Rate 78 BPM      P-R Interval 192 ms      QRS Duration 102 ms      Q-T Interval 366 ms      QTC Calculation (Bezet) 417 ms      Calculated P Axis 78 degrees      Calculated R Axis 58 degrees      Calculated T Axis 83 degrees      Diagnosis --     !! AGE AND GENDER SPECIFIC ECG ANALYSIS !! Sinus rhythm with Premature atrial complexes  Incomplete right bundle branch block  Borderline ECG  When compared with ECG of 16-JAN-2011 09:18,  Premature atrial complexes are now Present  Vent.  rate has increased BY  33 BPM  Confirmed by TRACY SHRESTHA (), Veena Donahue ([id number]) on 6/18/2021 6:26:06 PM            All Micro Results     Procedure Component Value Units Date/Time    BLOOD CULTURE ID PANEL [[id number]]  (Abnormal) Collected: 06/18/21 1355    Order Status: Completed Specimen: Blood Updated: 06/21/21 0824     Acc. no. from Micro Order [id number]     Escherichia coli Detected        Comment: RESULTS VERIFIED, PHONED TO AND READ BACK BY  Graciela Anderson RN @ 1932 ON 6.20.21 [facility]          KPC (Carbapenem Resistance Gene) NOT DETECTED        Comment: WARNING:  A Not Detected result for the KPC gene does not indicate susceptibility to carbapenems. Gram negative bacteria can be resistant to carbapenems by mechanisms other than carrying the KPC gene. INTERPRETATION       Gram negative taryn. Identified by realtime PCR as E. coli          BLOOD CULTURE [[id number]]  (Abnormal) Collected: 06/18/21 1355    Order Status: Completed Specimen: Blood Updated: 06/21/21 0822     Special Requests: --        RIGHT  Antecubital       GRAM STAIN GRAM NEGATIVE RODS         ANAEROBIC BOTTLE POSITIVE               RESULTS VERIFIED, PHONED TO AND READ BACK BY Mary López RN @ 0692 ON 6.20.21 [facility]           Culture result:       GRAM NEGATIVE RODS IDENTIFICATION AND SUSCEPTIBILITY TO FOLLOW                  Refer to Blood Culture ID Panel Accession  [id number]      CULTURE, BODY FLUID Luis Miguel Lull STAIN [[id number]]     Order Status: Sent Specimen: Body Fluid from Miscellaneous Fluid     CULTURE, URINE [[id number]]  (Abnormal)  (Susceptibility) Collected: 06/18/21 1404    Order Status: Completed Specimen: Urine Updated: 06/21/21 0749     Special Requests: NO SPECIAL REQUESTS        Culture result:       >100,000 COLONIES/mL ESCHERICHIA COLI                  50,000-100,000 COLONIES/mL KLEBSIELLA PNEUMONIAE                  <10,000 COLONIES/mL MIXED SKIN OFE ISOLATED          BLOOD CULTURE [[id number]] Collected: 06/18/21 1628    Order Status: Completed Specimen: Blood Updated: 06/21/21 0733     Special Requests: --        NO SPECIAL REQUESTS  LEFT  Antecubital       Culture result: NO GROWTH 3 DAYS       CULTURE, BLOOD [[id number]] Collected: 06/21/21 0449    Order Status: Completed Specimen: Blood Updated: 06/21/21 0512    CULTURE, BLOOD [[id number]] Collected: 06/21/21 0441    Order Status: Completed Specimen: Blood Updated: 06/21/21 0511          Other Studies:  IR NEPHROSTOMY PERC LT PLC CATH  SI    Result Date: 6/21/2021  Title: Percutaneous nephrostomy Placement. Indication: History of ileal conduit.  New left-sided pain with hydronephrosis and UTI. Consent: Informed written and oral consent was obtained from the patient after explanation of benefits and risks (including, but not limited to: infection, hemorrhage, visceral injury). The patient's questions were answered to his satisfaction. He stated understanding and requested that we proceed. Procedure:  Maximal sterile barrier technique (including:  cap, mask, sterile gown, sterile gloves, sterile sheet, hand hygiene, and chlorhexidene for cutaneous antisepsis) were used. With the patient prone the skin of the left flank was prepped and draped in the standard fashion. Ultrasound evaluation was performed. .  An appropriate skin location was chosen and anesthetized with lidocaine. Using ultrasound guidance, a needle was advanced into the mid calyx. An antegrade nephrostogram was performed and permanent radiographic images were obtained. An 018 [place] wire was passed into the renal collecting system. Using an [place], a   wire was placed. Over a wire, an 10 Bulgarian nephrostomy was positioned into the renal pelvis. Contrast was administered confirming appropriate catheter position. This sheath/catheter was then secured . Specimen:  Microbiology Complication:  None. Radiation Exposure Indices: Reference Air Kerma (Ka,r) = 42 mGy Dose Area Product/Kerma Area Product (DAP/CAYETANO/PKA) = 7443 cGy-cm2 Fluoroscopy Exposure Time = 3 minutes Contrast:  6 milliliters. Medications:  Under physician supervision, 21 minutes of moderate intravenous conscious sedation was performed by administering Versed, Fentanyl intravenously. Continuous pulse oximetry, heart rate, and blood pressure monitoring was performed with an independent, trained observer present. Findings: Moderate hydronephrosis     Uncomplicated left percutaneous nephrostomy placement. Plan: Patient has been transported to their hospital room. Follow-up as per urology.  We could consider internalizing the drain if clinically indicated after resolution of UTI         Medications:  Medications Administered       acetaminophen (TYLENOL) tablet 650 mg       Admin Date  03/13/2021 Action  Given Dose  650 mg Route  Oral Administered By  Gris Cabrera RN              cefTRIAXone (ROCEPHIN) 1 g in 0.9% sodium chloride (MBP/ADV) 50 mL MBP       Admin Date  03/13/2021 Action  New Bag Dose  1 g Rate  100 mL/hr Route  IntraVENous Administered By  Jimmy Montana RN              cefTRIAXone (ROCEPHIN) 2 g in 0.9% sodium chloride (MBP/ADV) 50 mL MBP       Admin Date  03/13/2021 Action  New Bag Dose  2 g Rate  100 mL/hr Route  IntraVENous Administered By  Leonid Mondragon RN               Admin Date  03/14/2021 Action  New Bag Dose  2 g Rate  100 mL/hr Route  IntraVENous Administered By  Quin Hirsch RN               Admin Date  03/15/2021 Action  New Bag Dose  2 g Rate  100 mL/hr Route  IntraVENous Administered By  Quin Hirsch RN               Admin Date  03/16/2021 Action  New Bag Dose  2 g Rate  100 mL/hr Route  IntraVENous Administered By  Beltran Hidalgo              diphenhydrAMINE (BENADRYL) injection 25 mg       Admin Date  03/14/2021 Action  Given Dose  25 mg Route  IntraVENous Administered By  Mouna Wright RN              doxycycline (VIBRAMYCIN) 100 mg in 0.9% sodium chloride (MBP/ADV) 100 mL MBP       Admin Date  03/13/2021 Action  New Bag Dose  100 mg Rate  100 mL/hr Route  IntraVENous Administered By  Leonid Mondragon RN               Admin Date  03/13/2021 Action  New Bag Dose  100 mg Rate  100 mL/hr Route  IntraVENous Administered By  Mouna Wright RN               Admin Date  03/14/2021 Action  New Bag Dose  100 mg Rate  100 mL/hr Route  IntraVENous Administered By  Quin Hirsch RN               Admin Date  03/14/2021 Action  New Bag Dose  100 mg Rate  100 mL/hr Route  IntraVENous Administered By  Mouna Wright RN               Admin Date  03/15/2021 Action  New Bag Dose  100 mg Rate  100 mL/hr Route  IntraVENous Administered By  Johana Bailey RN              enoxaparin (LOVENOX) injection 40 mg       Admin Date  03/13/2021 Action  Given Dose  40 mg Route  SubCUTAneous Administered By  Herberth Villegas RN               Admin Date  03/13/2021 Action  Given Dose  40 mg Route  SubCUTAneous Administered By  Ermelinda Rapp RN               Admin Date  03/14/2021 Action  Given Dose  40 mg Route  SubCUTAneous Administered By  Johana Bailey RN               Admin Date  03/14/2021 Action  Given Dose  40 mg Route  SubCUTAneous Administered By  Ermelinda Rapp RN               Admin Date  03/15/2021 Action  Given Dose  40 mg Route  SubCUTAneous Administered By  Johana Bailey RN               Admin Date  03/15/2021 Action  Given Dose  40 mg Route  SubCUTAneous Administered By  Pito Latif RN               Admin Date  03/16/2021 Action  Given Dose  40 mg Route  SubCUTAneous Administered By  Elliot Jonas              fentaNYL citrate (PF) injection 50 mcg       Admin Date  03/13/2021 Action  Given Dose  50 mcg Route  IntraVENous Administered By  Herberth Villegas RN               Admin Date  03/14/2021 Action  Given Dose  50 mcg Route  IntraVENous Administered By  Ermelinda Rapp RN              ferrous sulfate tablet 325 mg       Admin Date  03/14/2021 Action  Given Dose  325 mg Route  Oral Administered By  Johana Bailey RN               Admin Date  03/14/2021 Action  Given Dose  325 mg Route  Oral Administered By  Johana Bailey RN               Admin Date  03/15/2021 Action  Given Dose  325 mg Route  Oral Administered By  Johana Bailey RN              folic acid (FOLVITE) tablet 1 mg       Admin Date  03/14/2021 Action  Given Dose  1 mg Route  Oral Administered By  Johana Bailey RN               Admin Date  03/15/2021 Action  Given Dose  1 mg Route  Oral Administered By  Johana Bailey RN               Admin Date  03/16/2021 Action  Given Dose  1 mg Route  Oral Administered By  Elliot Jonas              furosemide (LASIX) injection 20 mg       Admin Date  03/15/2021 Action  Given Dose  20 mg Route  IntraVENous Administered By  Ketty Edgar RN              furosemide (LASIX) injection 40 mg       Admin Date  03/13/2021 Action  Given Dose  40 mg Route  IntraVENous Administered By  Jany Humphrey RN               Admin Date  03/13/2021 Action  Given Dose  40 mg Route  IntraVENous Administered By  Mary Boyd RN               Admin Date  03/14/2021 Action  Given Dose  40 mg Route  IntraVENous Administered By  Ketty Edgar RN               Admin Date  03/14/2021 Action  Given Dose  40 mg Route  IntraVENous Administered By  Mary Boyd, JOSHUA               Admin Date  03/15/2021 Action  Given Dose  40 mg Route  IntraVENous Administered By  Sydney Marie RN               Admin Date  03/16/2021 Action  Given Dose  40 mg Route  IntraVENous Administered By  Celia Rodriguez              HYDROcodone-acetaminophen [facility]) 5-325 mg per tablet 1 Tab       Admin Date  03/14/2021 Action  Given Dose  1 Tab Route  Oral Administered By  Ketty Edgar RN               Admin Date  03/15/2021 Action  Given Dose  1 Tab Route  Oral Administered By  Mary Boyd RN              insulin lispro (HUMALOG) injection       Admin Date  03/13/2021 Action  Given Dose  2 Units Route  SubCUTAneous Administered By  Mary Boyd RN               Admin Date  03/14/2021 Action  Given Dose  2 Units Route  SubCUTAneous Administered By  Ketty Edgar RN               Admin Date  03/14/2021 Action  Given Dose  2 Units Route  SubCUTAneous Administered By  Ketty Edgar RN               Admin Date  03/14/2021 Action  Given Dose  4 Units Route  SubCUTAneous Administered By  Mary Boyd RN               Admin Date  03/15/2021 Action  Given Dose  2 Units Route  SubCUTAneous Administered By  Ketty Edgar RN               Admin Date  03/15/2021 Action  Given Dose  4 Units Route  SubCUTAneous Administered By  Ketty Edgar RN               Admin Date  03/15/2021 Action  Given Dose  2 Units Route  SubCUTAneous Administered By  Dorinda Bland RN               Admin Date  03/16/2021 Action  Given Dose  4 Units Route  SubCUTAneous Administered By  Beltran Hidalgo              levothyroxine (SYNTHROID) tablet 137 mcg       Admin Date  03/14/2021 Action  Given Dose  137 mcg Route  Oral Administered By  Quin Hirsch RN               Admin Date  03/15/2021 Action  Given Dose  137 mcg Route  Oral Administered By  Quin Hirsch RN               Admin Date  03/16/2021 Action  Given Dose  137 mcg Route  Oral Administered By  Dorinda Bland RN              midodrine (PROAMATINE) tablet 10 mg       Admin Date  03/14/2021 Action  Given Dose  10 mg Route  Oral Administered By  Quin Hirsch, JOSHUA               Admin Date  03/14/2021 Action  Given Dose  10 mg Route  Oral Administered By  Quin Hirsch RN               Admin Date  03/15/2021 Action  Given Dose  10 mg Route  Oral Administered By  Quin Hirsch RN               Admin Date  03/15/2021 Action  Given Dose  10 mg Route  Oral Administered By  Quin Hirsch RN               Admin Date  03/15/2021 Action  Given Dose  10 mg Route  Oral Administered By  Quin Hirsch RN               Admin Date  03/16/2021 Action  Given Dose  10 mg Route  Oral Administered By  Marko Salinas Date  03/16/2021 Action  Given Dose  10 mg Route  Oral Administered By  Marko Salinas Date  03/16/2021 Action  Given Dose  10 mg Route  Oral Administered By  Beltran Hidalgo              naloxone [facility]) injection 2 mg       Admin Date  03/13/2021 Action  Given Dose  2 mg Route  IntraVENous Administered By  Jimmy Montana RN              NOREPINephrine (LEVOPHED) 4 mg in 5% dextrose 250 mL infusion       Admin Date  03/14/2021 Action  New Bag Dose  4 mcg/min Rate  15 mL/hr Route  IntraVENous Administered By  Quin Hirsch RN               Admin Date  03/14/2021 Action  Rate Change Dose  6 mcg/min Rate  22.5 mL/hr Route  IntraVENous Administered By  Shaylee Thomason RN               Admin Date  03/14/2021 Action  Rate Change Dose  4 mcg/min Rate  15 mL/hr Route  IntraVENous Administered By  Shaylee Thomason RN               Admin Date  03/14/2021 Action  Rate Change Dose  2 mcg/min Rate  7.5 mL/hr Route  IntraVENous Administered By  Shaylee Thomason RN               Admin Date  03/14/2021 Action  Rate Change Dose  1 mcg/min Rate  3.8 mL/hr Route  IntraVENous Administered By  Shaylee Thomason RN               Admin Date  03/14/2021 Action  Restarted Dose  2 mcg/min Rate  7.5 mL/hr Route  IntraVENous Administered By  Shaylee Thomason RN               Admin Date  03/14/2021 Action  Rate Change Dose  4 mcg/min Rate  15 mL/hr Route  IntraVENous Administered By  Shaylee Thomason RN               Admin Date  03/15/2021 Action  Rate Change Dose  2 mcg/min Rate  7.5 mL/hr Route  IntraVENous Administered By  Jean Mccarty RN              ondansetron [facility]) injection 4 mg       Admin Date  03/13/2021 Action  Given Dose  4 mg Route  IntraVENous Administered By  Adriano Stevens RN              pantoprazole (PROTONIX) tablet 40 mg       Admin Date  03/14/2021 Action  Given Dose  40 mg Route  Oral Administered By  Shaylee Thomason RN               Admin Date  03/15/2021 Action  Given Dose  40 mg Route  Oral Administered By  Shaylee Thomason RN               Admin Date  03/16/2021 Action  Given Dose  40 mg Route  Oral Administered By  Kimberly Higginbotham              perflutren lipid microspheres (DEFINITY) in NS bolus IV       Admin Date  03/13/2021 Action  Given Dose  1 mL Route  IntraVENous Administered By  CAROLINA Zuniga              potassium chloride (K-DUR, KLOR-CON) SR tablet 40 mEq       Admin Date  03/15/2021 Action  Given Dose  40 mEq Route  Oral Administered By  Shaylee Thomason RN               Admin Date  03/16/2021 Action  Given Dose  40 mEq Route  Oral Administered By  Vic Ann Tatiana              potassium chloride 40 mEq IVPB       Admin Date  03/15/2021 Action  New Bag Dose  40 mEq Rate  25 mL/hr Route  IntraVENous Administered By  Jean Mccarty RN              pregabalin (LYRICA) capsule 300 mg       Admin Date  03/14/2021 Action  Given Dose  300 mg Route  Oral Administered By  Shaylee Thomason RN               Admin Date  03/14/2021 Action  Given Dose  300 mg Route  Oral Administered By  Shaylee Thomason RN               Admin Date  03/15/2021 Action  Given Dose  300 mg Route  Oral Administered By  Shaylee Thomason RN               Admin Date  03/15/2021 Action  Given Dose  300 mg Route  Oral Administered By  Mayra Weir RN               Admin Date  03/16/2021 Action  Given Dose  300 mg Route  Oral Administered By  Kimberly Higginbotham              sertraline (ZOLOFT) tablet 300 mg       Admin Date  03/14/2021 Action  Given Dose  300 mg Route  Oral Administered By  Shaylee Thomason RN               Admin Date  03/15/2021 Action  Given Dose  300 mg Route  Oral Administered By  Shaylee Thomason RN               Admin Date  03/16/2021 Action  Given Dose  300 mg Route  Oral Administered By  Kimberly Higginbotham              sodium chloride (NS) flush 5-40 mL       Admin Date  03/13/2021 Action  Given Dose  10 mL Route  IntraVENous Administered By  Linnea Licea RN               Admin Date  03/13/2021 Action  Given Dose  30 mL Route  IntraVENous Administered By  Linnea Licea RN               Admin Date  03/13/2021 Action  Given Dose  40 mL Route  IntraVENous Administered By  Jean Mccarty RN               Admin Date  03/14/2021 Action  Given Dose  40 mL Route  IntraVENous Administered By  Jean Mccarty RN               Admin Date  03/14/2021 Action  Given Dose  10 mL Route  IntraVENous Administered By  Shaylee Thomason RN               Admin Date  03/14/2021 Action  Given Dose  40 mL Route  IntraVENous Administered By  Jean Mccarty RN               Admin Date  03/15/2021 Action  Given Dose  40 mL Route  IntraVENous Administered By  Farhan Rincon RN               Admin Date  03/15/2021 Action  Given Dose  10 mL Route  IntraVENous Administered By  Dougie Crawford RN               Admin Date  03/15/2021 Action  Given Dose  10 mL Route  IntraVENous Administered By  Mitchell Kamara RN               Admin Date  03/16/2021 Action  Given Dose  10 mL Route  IntraVENous Administered By  Debbie Rubio              sodium chloride 0.9 % bolus infusion 250 mL       Admin Date  03/14/2021 Action  New Bag Dose  250 mL Rate  250 mL/hr Route  IntraVENous Administered By  Dougie Carwford RN              tiZANidine (ZANAFLEX) tablet 4 mg       Admin Date  03/14/2021 Action  Given Dose  4 mg Route  Oral Administered By  Farhan Rincon RN               Admin Date  03/14/2021 Action  Given Dose  4 mg Route  Oral Administered By  Dougie Crawford RN               Admin Date  03/14/2021 Action  Given Dose  4 mg Route  Oral Administered By  Dougie Crawford RN               Admin Date  03/14/2021 Action  Given Dose  4 mg Route  Oral Administered By  Farhan Rincon RN               Admin Date  03/15/2021 Action  Given Dose  4 mg Route  Oral Administered By  Dougie Crawford RN               Admin Date  03/15/2021 Action  Given Dose  4 mg Route  Oral Administered By  Dougie Crawford RN               Admin Date  03/15/2021 Action  Given Dose  4 mg Route  Oral Administered By  Mitchell Kamara RN               Admin Date  03/16/2021 Action  Given Dose  4 mg Route  Oral Administered By  Keshav Alaner Date  03/16/2021 Action  Given Dose  4 mg Route  Oral Administered By  Debbie Rubio              traZODone (DESYREL) tablet 150 mg       Admin Date  03/14/2021 Action  Given Dose  150 mg Route  Oral Administered By  Farhan Rincon RN               Admin Date  03/15/2021 Action  Given Dose  150 mg Route  Oral Administered By  Mitchell Kamara RN              tuberculin injection 5 Units       Admin Date  03/15/2021 Action  Give PPD Dose  5 Units Route  IntraDERMal Administered By  Luis E Patel RN              vancomycin (VANCOCIN) 2500 mg in  mL infusion       Admin Date  03/13/2021 Action  Given Dose  2,500 mg Rate  200 mL/hr Route  IntraVENous Administered By  Moustapha De La Cruz RN              zonisamide (ZONEGRAN) capsule 300 mg       Admin Date  03/14/2021 Action  Given Dose  300 mg Route  Oral Administered By  Matt Pyle RN               Admin Date  03/15/2021 Action  Given Dose  300 mg Route  Oral Administered By  Carmenza Cabral RN                        Problem List:     Hospital Problems as of 6/21/2021 Date Reviewed: 5/20/2021        Codes Class Noted - Resolved POA    Urinary tract infection ICD-10-CM: N39.0  ICD-9-CM: 599.0  6/18/2021 - Present Unknown        * (Principal) [facility] ICD-10-CM: A41.9, R65.20  ICD-9-CM: 038.9, 995.92  6/18/2021 - Present Unknown        Acute encephalopathy ICD-10-CM: G93.40  ICD-9-CM: 348.30  6/18/2021 - Present Unknown        Stage 3a chronic kidney disease ([facility] ICD-10-CM: N18.31  ICD-9-CM: 475. 3  Unknown - Present Yes        Major depressive disorder with single episode, in full remission [facility] ICD-10-CM: F32.5  ICD-9-CM: 296.26  Unknown - Present Yes        Essential hypertension ICD-10-CM: I10  ICD-9-CM: 401.9  7/31/2017 - Present Yes        Mixed hyperlipidemia ICD-10-CM: E78.2  ICD-9-CM: 272.2  7/31/2017 - Present Yes        Coronary artery disease involving coronary bypass graft of native heart without angina pectoris ICD-10-CM: I25.810  ICD-9-CM: 414.05  11/6/2015 - Present Yes                 Signed By: Michele Cummins MD   [email or web address] [facility]    June 21, 2021  4:22 PM

## 2021-06-21 NOTE — PROGRESS NOTES
Pt is a 69 yo male admitted due to a UTI and sepsis. CM consult received to assist with dc planning. Chart reviewed and SW met with pt to complete assessment. Demographics, insurance and PCP confirmed. Pt confirmed that he lives alone but has 2 sons in the area who are very supportive and assist as needed. Pt has all needed DME in the home. Pt does not think he needs to go to [facility] but he is agreeable to [facility] services. He expressed no preference of [facility] agency and is in agreement with referral to [facility]. Referral for RN/PT/OT services submitted. Pt provided with Beneficiary Notification Letter that he is a bundled patient. No other dc needs or concerns identified or reported. SW to continue to follow to assist as needed. Care Management Interventions  PCP Verified by CM: Yes  Last Visit to PCP: 05/20/21  Mode of Transport at Discharge:  Other (see comment) (family)  Transition of Care Consult (CM Consult): Discharge Planning, [address]: Yes  Discharge Durable Medical Equipment: No (has w/c, RW, motorized scooter)  Physical Therapy Consult: Yes  Occupational Therapy Consult: Yes  Speech Therapy Consult: No  Current Support Network: Own Home, Lives Alone, Family Lives Nearby  Confirm Follow Up Transport: Family  The Plan for Transition of Care is Related to the Following Treatment Goals : Home health nursing and therapy to improve pt's strength and functional abilities in the home  The Patient and/or Patient Representative was Provided with a Choice of Provider and Agrees with the Discharge Plan?: Yes  Freedom of Choice List was Provided with Basic Dialogue that Supports the Patient's Individualized Plan of Care/Goals, Treatment Preferences and Shares the Quality Data Associated with the Providers?: Yes  Discharge Location  Discharge Placement: Home with [facility])

## 2021-06-22 VITALS
DIASTOLIC BLOOD PRESSURE: 67 MMHG | RESPIRATION RATE: 16 BRPM | TEMPERATURE: 97.9 F | OXYGEN SATURATION: 98 % | WEIGHT: 177 LBS | SYSTOLIC BLOOD PRESSURE: 126 MMHG | BODY MASS INDEX: 25.34 KG/M2 | HEIGHT: 70 IN | HEART RATE: 62 BPM

## 2021-06-22 LAB
ANION GAP SERPL CALC-SCNC: 5 MMOL/L (ref 7–16)
BACTERIA SPEC CULT: ABNORMAL
BACTERIA SPEC CULT: ABNORMAL
BASOPHILS # BLD: 0 K/UL (ref 0–0.2)
BASOPHILS NFR BLD: 0 % (ref 0–2)
BUN SERPL-MCNC: 15 MG/DL (ref 8–23)
CALCIUM SERPL-MCNC: 8.4 MG/DL (ref 8.3–10.4)
CHLORIDE SERPL-SCNC: 113 MMOL/L (ref 98–107)
CO2 SERPL-SCNC: 26 MMOL/L (ref 21–32)
CREAT SERPL-MCNC: 1.3 MG/DL (ref 0.8–1.5)
DIFFERENTIAL METHOD BLD: ABNORMAL
EOSINOPHIL # BLD: 0.1 K/UL (ref 0–0.8)
EOSINOPHIL NFR BLD: 2 % (ref 0.5–7.8)
ERYTHROCYTE [DISTWIDTH] IN BLOOD BY AUTOMATED COUNT: 14.1 % (ref 11.9–14.6)
GLUCOSE SERPL-MCNC: 106 MG/DL (ref 65–100)
GRAM STN SPEC: ABNORMAL
HCT VFR BLD AUTO: 32.6 % (ref 41.1–50.3)
HGB BLD-MCNC: 10.8 G/DL (ref 13.6–17.2)
IMM GRANULOCYTES # BLD AUTO: 0 K/UL (ref 0–0.5)
IMM GRANULOCYTES NFR BLD AUTO: 1 % (ref 0–5)
LYMPHOCYTES # BLD: 1.7 K/UL (ref 0.5–4.6)
LYMPHOCYTES NFR BLD: 28 % (ref 13–44)
MCH RBC QN AUTO: 31.1 PG (ref 26.1–32.9)
MCHC RBC AUTO-ENTMCNC: 33.1 G/DL (ref 31.4–35)
MCV RBC AUTO: 93.9 FL (ref 79.6–97.8)
MONOCYTES # BLD: 0.6 K/UL (ref 0.1–1.3)
MONOCYTES NFR BLD: 10 % (ref 4–12)
NEUTS SEG # BLD: 3.7 K/UL (ref 1.7–8.2)
NEUTS SEG NFR BLD: 59 % (ref 43–78)
NRBC # BLD: 0 K/UL (ref 0–0.2)
PLATELET # BLD AUTO: 120 K/UL (ref 150–450)
PMV BLD AUTO: 9.7 FL (ref 9.4–12.3)
POTASSIUM SERPL-SCNC: 4.1 MMOL/L (ref 3.5–5.1)
RBC # BLD AUTO: 3.47 M/UL (ref 4.23–5.6)
SERVICE CMNT-IMP: ABNORMAL
SODIUM SERPL-SCNC: 144 MMOL/L (ref 136–145)
WBC # BLD AUTO: 6.2 K/UL (ref 4.3–11.1)

## 2021-06-22 PROCEDURE — 85025 COMPLETE CBC W/AUTO DIFF WBC: CPT

## 2021-06-22 PROCEDURE — 36415 COLL VENOUS BLD VENIPUNCTURE: CPT

## 2021-06-22 PROCEDURE — 74011250637 HC RX REV CODE- 250/637: Performed by: HOSPITALIST

## 2021-06-22 PROCEDURE — 80048 BASIC METABOLIC PNL TOTAL CA: CPT

## 2021-06-22 PROCEDURE — 74011250637 HC RX REV CODE- 250/637: Performed by: UROLOGY

## 2021-06-22 RX ORDER — CEFDINIR 300 MG/1
300 CAPSULE ORAL 2 TIMES DAILY
Qty: 20 CAPSULE | Refills: 0 | Status: SHIPPED | OUTPATIENT
Start: 2021-06-22 | End: 2021-07-27

## 2021-06-22 RX ADMIN — Medication 10 ML: at 05:23

## 2021-06-22 RX ADMIN — LOSARTAN POTASSIUM 50 MG: 50 TABLET, FILM COATED ORAL at 09:04

## 2021-06-22 RX ADMIN — Medication 1 AMPULE: at 09:04

## 2021-06-22 RX ADMIN — FLUOXETINE 20 MG: 10 CAPSULE ORAL at 09:05

## 2021-06-22 RX ADMIN — ACETAMINOPHEN 650 MG: 325 TABLET ORAL at 05:33

## 2021-06-22 RX ADMIN — ASPIRIN 81 MG: 81 TABLET ORAL at 09:04

## 2021-06-22 RX ADMIN — FAMOTIDINE 20 MG: 20 TABLET ORAL at 09:05

## 2021-06-22 RX ADMIN — Medication 10 ML: at 05:33

## 2021-06-22 NOTE — PROGRESS NOTES
Admit Date: 6/18/2021    Subjective:     Trish Morris is resting. No complaints. Objective:     Patient Vitals for the past 8 hrs:   BP Temp Pulse Resp SpO2   06/22/21 0727 134/71 98.1 °F (36.7 °C) 79 16 97 %   06/22/21 0335 130/70 99.1 °F (37.3 °C) 67 16 96 %     No intake/output data recorded. 06/20 1901 - 06/22 0700  In: -   Out: 3460 [Urine:3210]    Physical Exam:  GENERAL: alert, cooperative, no distress  LUNG: clear to auscultation bilaterally  HEART: regular rate and rhythm, S1, S2   ABDOMEN: soft, non-tender  NEUROLOGIC: AOx3    Data Review   Recent Results (from the past 24 hour(s))   PLEASE READ & DOCUMENT PPD TEST IN 24 HRS    Collection Time: 06/21/21  2:50 PM   Result Value Ref Range    PPD Negative Negative    mm Induration 0 0 - 5 mm   CBC WITH AUTOMATED DIFF    Collection Time: 06/22/21  4:19 AM   Result Value Ref Range    WBC 6.2 4.3 - 11.1 K/uL    RBC 3.47 (L) 4.23 - 5.6 M/uL    HGB 10.8 (L) 13.6 - 17.2 g/dL    HCT 32.6 (L) 41.1 - 50.3 %    MCV 93.9 79.6 - 97.8 FL    MCH 31.1 26.1 - 32.9 PG    MCHC 33.1 31.4 - 35.0 g/dL    RDW 14.1 11.9 - 14.6 %    PLATELET 982 (L) 904 - 450 K/uL    MPV 9.7 9.4 - 12.3 FL    ABSOLUTE NRBC 0.00 0.0 - 0.2 K/uL    DF AUTOMATED      NEUTROPHILS 59 43 - 78 %    LYMPHOCYTES 28 13 - 44 %    MONOCYTES 10 4.0 - 12.0 %    EOSINOPHILS 2 0.5 - 7.8 %    BASOPHILS 0 0.0 - 2.0 %    IMMATURE GRANULOCYTES 1 0.0 - 5.0 %    ABS. NEUTROPHILS 3.7 1.7 - 8.2 K/UL    ABS. LYMPHOCYTES 1.7 0.5 - 4.6 K/UL    ABS. MONOCYTES 0.6 0.1 - 1.3 K/UL    ABS. EOSINOPHILS 0.1 0.0 - 0.8 K/UL    ABS. BASOPHILS 0.0 0.0 - 0.2 K/UL    ABS. IMM.  GRANS. 0.0 0.0 - 0.5 K/UL   METABOLIC PANEL, BASIC    Collection Time: 06/22/21  4:19 AM   Result Value Ref Range    Sodium 144 136 - 145 mmol/L    Potassium 4.1 3.5 - 5.1 mmol/L    Chloride 113 (H) 98 - 107 mmol/L    CO2 26 21 - 32 mmol/L    Anion gap 5 (L) 7 - 16 mmol/L    Glucose 106 (H) 65 - 100 mg/dL    BUN 15 8 - 23 MG/DL    Creatinine 1.30 0.8 - 1.5 MG/DL    GFR est AA >60 >60 ml/min/1.73m2    GFR est non-AA 57 (L) >60 ml/min/1.73m2    Calcium 8.4 8.3 - 10.4 MG/DL       Assessment:     Principal Problem:    Severe sepsis ([facility] (6/18/2021)    Active Problems:    Coronary artery disease involving coronary bypass graft of native heart without angina pectoris (11/6/2015)      Essential hypertension (7/31/2017)      Mixed hyperlipidemia (7/31/2017)      Major depressive disorder with single episode, in full remission ([facility] ()      Stage 3a chronic kidney disease (H[facility]) ()      Urinary tract infection (6/18/2021)      Acute encephalopathy (6/18/2021)      68year old male with hx of bladder cancer and cystectomy and ileal conduit in 2008, presents with sepsis due to UTI, severe L hydro-ureteronephrosis and obstructing 5 distal L ureteral stones. Urine culture with klebsiella/E coli. Clinically stable on IV antibiotics. Had left nephrostomy tube placed 6/21/21. Afebrile, VSS. Cn 1.30. WBC 6.2. UOP- 910 per NT, 500 per urostomy/12 hours. Plan:     D/c home today with NT and with PO abx x 10 days. He will RTO in around 10-14 days to discuss PCNL with Dr. Ky Quintanilla. Will get appt scheduled and notify pt of appt time/date. Jocelyn Bernal NP  [facility]  I have reviewed the above note and examined the patient. I agree with the exam, assessment and plan.     Juan Carlos Phillips MD

## 2021-06-22 NOTE — ROUTINE PROCESS
Verbal bedside report received from Tawana, [facility]. Assumed care of patient.

## 2021-06-22 NOTE — DISCHARGE INSTRUCTIONS
PCP in 2-4 weeks, Urology and Interventional radiology as set up, diabetic diet, activity as tolerated, nephrostomy care per nursing instructions

## 2021-06-22 NOTE — DISCHARGE SUMMARY
Physician Discharge Summary     Patient ID:  Alyson Lai  [id number]  50 y.o.  1940    Admit date: 6/18/2021    Discharge date: 6-    Diagnosis:  1- Complicated UTI with sepsis, improving, urinary and blood cultures grew Klebsiella and E.coli. Seen by Urology. 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 Has Percutaneous Nephrostomy Tube Placement on 6-21-21. Treated with Cephalosporins. Improved. 2- Acute kidney injury on CKD-2, improved to baseline  3- Acute encephalopathy, due to #1,  resolved  4- Hypertnsion: controlled  5- Hx of Anxiety/depression, CABG. Stable. 6- Hypokalemia, replaced  [address] [place] [address] course:    68 y.o. 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 Pt is alert, awake reports feeling somewhat better after IV fluids in ER. He reports feeling weak, and having a fall as he felt extremely weak in his leg. He denies chest pain, SOB, cough, but does report mild abdominal discomfort in around ileal conduit site. Pt denies nausea/vomiting, fever, chills, diarrhea. ER work up remarkable for Cr 1.74, UA with 4+ bacteriuria, LA 2.7, A1c 6.0. CT head and CXR unremarkable. Patient admitted and treated as above. On day of discharge, patient exam showed normal abdominal exam, pt felt well and asymptomatic. Nephrostomy bag had scanty clear fluid. PCP: Tracey Gentile MD    Patient Instructions:   Current Discharge Medication List      START taking these medications    Details   cefdinir (OMNICEF) 300 mg capsule Take 1 Capsule by mouth two (2) times a day. Qty: 20 Capsule, Refills: 0         CONTINUE these medications which have NOT CHANGED    Details   losartan (COZAAR) 100 mg tablet Take 1 Tab by mouth daily.   Qty: 90 Tab, Refills: 3      FLUoxetine (PROzac) 20 mg capsule Take 1 Cap by mouth daily. Qty: 90 Cap, Refills: 1    Associated Diagnoses: Major depressive disorder with single episode, in full remission ([facility]      simvastatin (ZOCOR) 80 mg tablet TAKE 1 TABLET AT BEDTIME  Qty: 90 Tab, Refills: 3    Associated Diagnoses: Coronary artery disease involving coronary bypass graft of native heart without angina pectoris; Mixed hyperlipidemia      aspirin delayed-release 81 mg tablet Take 1 Tab by mouth daily. Indications: MYOCARDIAL INFARCTION PREVENTION  Qty: 1 Tab, Refills: 1             Instructions:     PCP in 2-4 weeks, Urology and Interventional radiology as set up, diabetic diet, activity as tolerated, nephrostomy care per nursing instructions    Time 35 min  Please send copy to primary physician.     Signed:  Kaye Finley MD  6/22/2021  9:27 AM

## 2021-06-22 NOTE — PROGRESS NOTES
Pt was medically cleared for dc to home today with [facility] RN/PT/OT services through [facility]. No other dc needs or concerns identified or reported. Care Management Interventions  PCP Verified by CM: Yes  Last Visit to PCP: 05/20/21  Mode of Transport at Discharge:  Other (see comment) (family)  Transition of Care Consult (CM Consult): Discharge Planning, [address]: Yes  Discharge Durable Medical Equipment: No (has w/c, RW, motorized scooter)  Physical Therapy Consult: Yes  Occupational Therapy Consult: Yes  Speech Therapy Consult: No  Current Support Network: Own Home, Lives Alone, Family Lives Nearby  Confirm Follow Up Transport: Family  The Plan for Transition of Care is Related to the Following Treatment Goals : Home health nursing and therapy to improve pt's strength and functional abilities in the home  The Patient and/or Patient Representative was Provided with a Choice of Provider and Agrees with the Discharge Plan?: Yes  Freedom of Choice List was Provided with Basic Dialogue that Supports the Patient's Individualized Plan of Care/Goals, Treatment Preferences and Shares the Quality Data Associated with the Providers?: Yes  Discharge Location  Discharge Placement: Home with [facility])

## 2021-06-22 NOTE — PROGRESS NOTES
Spiritual Care  Initial visit from [facility] Mo Morin. Chaplains to follow-up, as needed.

## 2021-06-23 ENCOUNTER — HOME CARE VISIT (OUTPATIENT)
Dept: SCHEDULING | Facility: HOME HEALTH | Age: 81
End: 2021-06-23
Payer: MEDICARE

## 2021-06-23 VITALS
BODY MASS INDEX: 24.5 KG/M2 | DIASTOLIC BLOOD PRESSURE: 80 MMHG | RESPIRATION RATE: 18 BRPM | HEIGHT: 71 IN | SYSTOLIC BLOOD PRESSURE: 140 MMHG | HEART RATE: 74 BPM | TEMPERATURE: 98.4 F | WEIGHT: 175 LBS | OXYGEN SATURATION: 98 %

## 2021-06-23 LAB
BACTERIA SPEC CULT: NORMAL
BACTERIA SPEC CULT: NORMAL
SERVICE CMNT-IMP: NORMAL
SERVICE CMNT-IMP: NORMAL

## 2021-06-23 PROCEDURE — 3331090001 HH PPS REVENUE CREDIT

## 2021-06-23 PROCEDURE — MED11639

## 2021-06-23 PROCEDURE — G0299 HHS/HOSPICE OF RN EA 15 MIN: HCPCS

## 2021-06-23 PROCEDURE — A6216 NON-STERILE GAUZE<=16 SQ IN: HCPCS

## 2021-06-23 PROCEDURE — 400013 HH SOC

## 2021-06-23 PROCEDURE — 3331090002 HH PPS REVENUE DEBIT

## 2021-06-23 PROCEDURE — A6252 ABSORPT DRG >16 <=48 W/O BDR: HCPCS

## 2021-06-23 PROCEDURE — MED11861

## 2021-06-23 PROCEDURE — 400018 HH-NO PAY CLAIM PROCEDURE

## 2021-06-24 ENCOUNTER — TELEPHONE (OUTPATIENT)
Dept: HOME HEALTH SERVICES | Facility: HOME HEALTH | Age: 81
End: 2021-06-24

## 2021-06-24 ENCOUNTER — HOME CARE VISIT (OUTPATIENT)
Dept: SCHEDULING | Facility: HOME HEALTH | Age: 81
End: 2021-06-24
Payer: MEDICARE

## 2021-06-24 VITALS
TEMPERATURE: 97.6 F | HEART RATE: 60 BPM | SYSTOLIC BLOOD PRESSURE: 128 MMHG | RESPIRATION RATE: 15 BRPM | DIASTOLIC BLOOD PRESSURE: 72 MMHG

## 2021-06-24 PROCEDURE — 3331090002 HH PPS REVENUE DEBIT

## 2021-06-24 PROCEDURE — 3331090001 HH PPS REVENUE CREDIT

## 2021-06-24 PROCEDURE — G0151 HHCP-SERV OF PT,EA 15 MIN: HCPCS

## 2021-06-24 NOTE — TELEPHONE ENCOUNTER
[address] [place] [address] Wu Newman Pharmacist consult. Mr. Mackey Krabbe was discharged from [facility] after having sepsis due to UTI and stones. I spoke with his son Ruba Lo who said he is his father's contact. I explained my part of the [facility] team.  I reviewed his father's discharge medications. He said they got his antibiotic(only new medication) on the day he was discharged and started it that day. No questions or issues at this time. He said it was OK to call back.

## 2021-06-25 ENCOUNTER — HOME CARE VISIT (OUTPATIENT)
Dept: SCHEDULING | Facility: HOME HEALTH | Age: 81
End: 2021-06-25
Payer: MEDICARE

## 2021-06-25 PROCEDURE — 3331090001 HH PPS REVENUE CREDIT

## 2021-06-25 PROCEDURE — 3331090002 HH PPS REVENUE DEBIT

## 2021-06-25 PROCEDURE — G0299 HHS/HOSPICE OF RN EA 15 MIN: HCPCS

## 2021-06-26 VITALS
TEMPERATURE: 98.1 F | DIASTOLIC BLOOD PRESSURE: 72 MMHG | RESPIRATION RATE: 16 BRPM | OXYGEN SATURATION: 98 % | HEART RATE: 64 BPM | SYSTOLIC BLOOD PRESSURE: 132 MMHG

## 2021-06-26 PROCEDURE — 3331090002 HH PPS REVENUE DEBIT

## 2021-06-26 PROCEDURE — 3331090001 HH PPS REVENUE CREDIT

## 2021-06-27 PROCEDURE — 3331090002 HH PPS REVENUE DEBIT

## 2021-06-27 PROCEDURE — 3331090001 HH PPS REVENUE CREDIT

## 2021-06-28 ENCOUNTER — HOME CARE VISIT (OUTPATIENT)
Dept: HOME HEALTH SERVICES | Facility: HOME HEALTH | Age: 81
End: 2021-06-28
Payer: MEDICARE

## 2021-06-28 ENCOUNTER — HOME CARE VISIT (OUTPATIENT)
Dept: SCHEDULING | Facility: HOME HEALTH | Age: 81
End: 2021-06-28
Payer: MEDICARE

## 2021-06-28 VITALS
HEART RATE: 67 BPM | TEMPERATURE: 98.8 F | OXYGEN SATURATION: 97 % | DIASTOLIC BLOOD PRESSURE: 74 MMHG | SYSTOLIC BLOOD PRESSURE: 130 MMHG | RESPIRATION RATE: 18 BRPM

## 2021-06-28 PROCEDURE — 3331090002 HH PPS REVENUE DEBIT

## 2021-06-28 PROCEDURE — G0299 HHS/HOSPICE OF RN EA 15 MIN: HCPCS

## 2021-06-28 PROCEDURE — 3331090001 HH PPS REVENUE CREDIT

## 2021-06-29 PROCEDURE — 3331090002 HH PPS REVENUE DEBIT

## 2021-06-29 PROCEDURE — 3331090001 HH PPS REVENUE CREDIT

## 2021-06-30 ENCOUNTER — HOME CARE VISIT (OUTPATIENT)
Dept: SCHEDULING | Facility: HOME HEALTH | Age: 81
End: 2021-06-30
Payer: MEDICARE

## 2021-06-30 VITALS
RESPIRATION RATE: 16 BRPM | SYSTOLIC BLOOD PRESSURE: 132 MMHG | TEMPERATURE: 97.9 F | HEART RATE: 60 BPM | OXYGEN SATURATION: 99 % | DIASTOLIC BLOOD PRESSURE: 80 MMHG

## 2021-06-30 VITALS
TEMPERATURE: 98.3 F | HEART RATE: 63 BPM | DIASTOLIC BLOOD PRESSURE: 68 MMHG | RESPIRATION RATE: 15 BRPM | SYSTOLIC BLOOD PRESSURE: 132 MMHG

## 2021-06-30 PROCEDURE — 3331090001 HH PPS REVENUE CREDIT

## 2021-06-30 PROCEDURE — G0151 HHCP-SERV OF PT,EA 15 MIN: HCPCS

## 2021-06-30 PROCEDURE — G0152 HHCP-SERV OF OT,EA 15 MIN: HCPCS

## 2021-06-30 PROCEDURE — 3331090002 HH PPS REVENUE DEBIT

## 2021-07-01 ENCOUNTER — HOME CARE VISIT (OUTPATIENT)
Dept: SCHEDULING | Facility: HOME HEALTH | Age: 81
End: 2021-07-01
Payer: MEDICARE

## 2021-07-01 VITALS
SYSTOLIC BLOOD PRESSURE: 142 MMHG | RESPIRATION RATE: 20 BRPM | TEMPERATURE: 98 F | DIASTOLIC BLOOD PRESSURE: 70 MMHG | HEART RATE: 52 BPM | OXYGEN SATURATION: 98 %

## 2021-07-01 PROCEDURE — 3331090002 HH PPS REVENUE DEBIT

## 2021-07-01 PROCEDURE — 3331090001 HH PPS REVENUE CREDIT

## 2021-07-01 PROCEDURE — G0299 HHS/HOSPICE OF RN EA 15 MIN: HCPCS

## 2021-07-02 ENCOUNTER — HOME CARE VISIT (OUTPATIENT)
Dept: SCHEDULING | Facility: HOME HEALTH | Age: 81
End: 2021-07-02
Payer: MEDICARE

## 2021-07-02 VITALS
HEART RATE: 86 BPM | RESPIRATION RATE: 18 BRPM | DIASTOLIC BLOOD PRESSURE: 80 MMHG | OXYGEN SATURATION: 97 % | SYSTOLIC BLOOD PRESSURE: 140 MMHG | TEMPERATURE: 97.3 F

## 2021-07-02 PROBLEM — A41.9 SEVERE SEPSIS (HCC): Status: RESOLVED | Noted: 2021-06-18 | Resolved: 2021-07-02

## 2021-07-02 PROBLEM — G93.40 ACUTE ENCEPHALOPATHY: Status: RESOLVED | Noted: 2021-06-18 | Resolved: 2021-07-02

## 2021-07-02 PROBLEM — N39.0 URINARY TRACT INFECTION: Status: RESOLVED | Noted: 2021-06-18 | Resolved: 2021-07-02

## 2021-07-02 PROBLEM — R65.20 SEVERE SEPSIS (HCC): Status: RESOLVED | Noted: 2021-06-18 | Resolved: 2021-07-02

## 2021-07-02 PROCEDURE — G0151 HHCP-SERV OF PT,EA 15 MIN: HCPCS

## 2021-07-02 PROCEDURE — 3331090002 HH PPS REVENUE DEBIT

## 2021-07-02 PROCEDURE — 3331090001 HH PPS REVENUE CREDIT

## 2021-07-03 PROCEDURE — 3331090002 HH PPS REVENUE DEBIT

## 2021-07-03 PROCEDURE — 3331090001 HH PPS REVENUE CREDIT

## 2021-07-04 PROCEDURE — 3331090002 HH PPS REVENUE DEBIT

## 2021-07-04 PROCEDURE — 3331090001 HH PPS REVENUE CREDIT

## 2021-07-05 ENCOUNTER — HOME CARE VISIT (OUTPATIENT)
Dept: SCHEDULING | Facility: HOME HEALTH | Age: 81
End: 2021-07-05
Payer: MEDICARE

## 2021-07-05 VITALS
SYSTOLIC BLOOD PRESSURE: 148 MMHG | DIASTOLIC BLOOD PRESSURE: 78 MMHG | TEMPERATURE: 98.2 F | HEART RATE: 66 BPM | RESPIRATION RATE: 18 BRPM

## 2021-07-05 PROCEDURE — 3331090002 HH PPS REVENUE DEBIT

## 2021-07-05 PROCEDURE — 3331090001 HH PPS REVENUE CREDIT

## 2021-07-05 PROCEDURE — G0151 HHCP-SERV OF PT,EA 15 MIN: HCPCS

## 2021-07-06 ENCOUNTER — HOME CARE VISIT (OUTPATIENT)
Dept: SCHEDULING | Facility: HOME HEALTH | Age: 81
End: 2021-07-06
Payer: MEDICARE

## 2021-07-06 VITALS
TEMPERATURE: 97.5 F | DIASTOLIC BLOOD PRESSURE: 79 MMHG | OXYGEN SATURATION: 98 % | SYSTOLIC BLOOD PRESSURE: 148 MMHG | HEART RATE: 66 BPM | RESPIRATION RATE: 20 BRPM

## 2021-07-06 PROCEDURE — G0299 HHS/HOSPICE OF RN EA 15 MIN: HCPCS

## 2021-07-06 PROCEDURE — 3331090001 HH PPS REVENUE CREDIT

## 2021-07-06 PROCEDURE — 3331090002 HH PPS REVENUE DEBIT

## 2021-07-07 ENCOUNTER — TELEPHONE (OUTPATIENT)
Dept: HOME HEALTH SERVICES | Facility: HOME HEALTH | Age: 81
End: 2021-07-07

## 2021-07-07 PROCEDURE — 3331090002 HH PPS REVENUE DEBIT

## 2021-07-07 PROCEDURE — 3331090001 HH PPS REVENUE CREDIT

## 2021-07-08 ENCOUNTER — HOME CARE VISIT (OUTPATIENT)
Dept: SCHEDULING | Facility: HOME HEALTH | Age: 81
End: 2021-07-08
Payer: MEDICARE

## 2021-07-08 VITALS
SYSTOLIC BLOOD PRESSURE: 138 MMHG | DIASTOLIC BLOOD PRESSURE: 82 MMHG | TEMPERATURE: 98.4 F | HEART RATE: 69 BPM | RESPIRATION RATE: 15 BRPM

## 2021-07-08 PROCEDURE — 3331090002 HH PPS REVENUE DEBIT

## 2021-07-08 PROCEDURE — 3331090001 HH PPS REVENUE CREDIT

## 2021-07-08 PROCEDURE — G0151 HHCP-SERV OF PT,EA 15 MIN: HCPCS

## 2021-07-09 ENCOUNTER — HOME CARE VISIT (OUTPATIENT)
Dept: SCHEDULING | Facility: HOME HEALTH | Age: 81
End: 2021-07-09
Payer: MEDICARE

## 2021-07-09 PROCEDURE — G0299 HHS/HOSPICE OF RN EA 15 MIN: HCPCS

## 2021-07-09 PROCEDURE — 3331090002 HH PPS REVENUE DEBIT

## 2021-07-09 PROCEDURE — 3331090001 HH PPS REVENUE CREDIT

## 2021-07-10 PROCEDURE — 3331090001 HH PPS REVENUE CREDIT

## 2021-07-10 PROCEDURE — 3331090002 HH PPS REVENUE DEBIT

## 2021-07-11 VITALS
TEMPERATURE: 97.5 F | DIASTOLIC BLOOD PRESSURE: 72 MMHG | HEART RATE: 78 BPM | OXYGEN SATURATION: 99 % | RESPIRATION RATE: 17 BRPM | SYSTOLIC BLOOD PRESSURE: 122 MMHG

## 2021-07-11 PROCEDURE — 3331090002 HH PPS REVENUE DEBIT

## 2021-07-11 PROCEDURE — 3331090001 HH PPS REVENUE CREDIT

## 2021-07-12 PROCEDURE — 3331090001 HH PPS REVENUE CREDIT

## 2021-07-12 PROCEDURE — 3331090002 HH PPS REVENUE DEBIT

## 2021-07-13 ENCOUNTER — HOME CARE VISIT (OUTPATIENT)
Dept: SCHEDULING | Facility: HOME HEALTH | Age: 81
End: 2021-07-13
Payer: MEDICARE

## 2021-07-13 PROCEDURE — 3331090001 HH PPS REVENUE CREDIT

## 2021-07-13 PROCEDURE — 3331090002 HH PPS REVENUE DEBIT

## 2021-07-13 PROCEDURE — G0299 HHS/HOSPICE OF RN EA 15 MIN: HCPCS

## 2021-07-14 ENCOUNTER — HOME CARE VISIT (OUTPATIENT)
Dept: SCHEDULING | Facility: HOME HEALTH | Age: 81
End: 2021-07-14
Payer: MEDICARE

## 2021-07-14 VITALS
SYSTOLIC BLOOD PRESSURE: 130 MMHG | HEART RATE: 60 BPM | RESPIRATION RATE: 12 BRPM | TEMPERATURE: 98.9 F | DIASTOLIC BLOOD PRESSURE: 78 MMHG

## 2021-07-14 PROCEDURE — 3331090001 HH PPS REVENUE CREDIT

## 2021-07-14 PROCEDURE — 3331090002 HH PPS REVENUE DEBIT

## 2021-07-14 PROCEDURE — G0151 HHCP-SERV OF PT,EA 15 MIN: HCPCS

## 2021-07-15 PROCEDURE — 3331090001 HH PPS REVENUE CREDIT

## 2021-07-15 PROCEDURE — 3331090002 HH PPS REVENUE DEBIT

## 2021-07-16 ENCOUNTER — HOME CARE VISIT (OUTPATIENT)
Dept: SCHEDULING | Facility: HOME HEALTH | Age: 81
End: 2021-07-16
Payer: MEDICARE

## 2021-07-16 VITALS
TEMPERATURE: 98.2 F | DIASTOLIC BLOOD PRESSURE: 76 MMHG | SYSTOLIC BLOOD PRESSURE: 124 MMHG | RESPIRATION RATE: 17 BRPM | HEART RATE: 78 BPM | OXYGEN SATURATION: 97 %

## 2021-07-16 VITALS
SYSTOLIC BLOOD PRESSURE: 124 MMHG | HEART RATE: 81 BPM | OXYGEN SATURATION: 98 % | TEMPERATURE: 97.7 F | DIASTOLIC BLOOD PRESSURE: 80 MMHG | RESPIRATION RATE: 18 BRPM

## 2021-07-16 PROCEDURE — G0299 HHS/HOSPICE OF RN EA 15 MIN: HCPCS

## 2021-07-16 PROCEDURE — 3331090001 HH PPS REVENUE CREDIT

## 2021-07-16 PROCEDURE — A4357 BEDSIDE DRAINAGE BAG: HCPCS

## 2021-07-16 PROCEDURE — 3331090002 HH PPS REVENUE DEBIT

## 2021-07-17 PROCEDURE — 3331090002 HH PPS REVENUE DEBIT

## 2021-07-17 PROCEDURE — 3331090001 HH PPS REVENUE CREDIT

## 2021-07-18 PROCEDURE — 3331090001 HH PPS REVENUE CREDIT

## 2021-07-18 PROCEDURE — 3331090002 HH PPS REVENUE DEBIT

## 2021-07-19 PROCEDURE — 3331090002 HH PPS REVENUE DEBIT

## 2021-07-19 PROCEDURE — 3331090001 HH PPS REVENUE CREDIT

## 2021-07-20 ENCOUNTER — HOME CARE VISIT (OUTPATIENT)
Dept: SCHEDULING | Facility: HOME HEALTH | Age: 81
End: 2021-07-20
Payer: MEDICARE

## 2021-07-20 VITALS
HEART RATE: 65 BPM | DIASTOLIC BLOOD PRESSURE: 85 MMHG | OXYGEN SATURATION: 99 % | TEMPERATURE: 98.4 F | SYSTOLIC BLOOD PRESSURE: 140 MMHG | WEIGHT: 170 LBS | BODY MASS INDEX: 23.71 KG/M2 | RESPIRATION RATE: 18 BRPM

## 2021-07-20 PROCEDURE — 3331090001 HH PPS REVENUE CREDIT

## 2021-07-20 PROCEDURE — 3331090002 HH PPS REVENUE DEBIT

## 2021-07-20 PROCEDURE — MED11943

## 2021-07-20 PROCEDURE — G0299 HHS/HOSPICE OF RN EA 15 MIN: HCPCS

## 2021-07-21 PROCEDURE — 3331090001 HH PPS REVENUE CREDIT

## 2021-07-21 PROCEDURE — 3331090002 HH PPS REVENUE DEBIT

## 2021-07-22 PROCEDURE — 3331090002 HH PPS REVENUE DEBIT

## 2021-07-22 PROCEDURE — 3331090001 HH PPS REVENUE CREDIT

## 2021-07-23 PROCEDURE — 3331090002 HH PPS REVENUE DEBIT

## 2021-07-23 PROCEDURE — 400018 HH-NO PAY CLAIM PROCEDURE

## 2021-07-23 PROCEDURE — 3331090001 HH PPS REVENUE CREDIT

## 2021-07-24 PROCEDURE — 3331090001 HH PPS REVENUE CREDIT

## 2021-07-24 PROCEDURE — 3331090002 HH PPS REVENUE DEBIT

## 2021-07-25 PROCEDURE — 3331090001 HH PPS REVENUE CREDIT

## 2021-07-25 PROCEDURE — 3331090002 HH PPS REVENUE DEBIT

## 2021-07-26 ENCOUNTER — HOME CARE VISIT (OUTPATIENT)
Dept: SCHEDULING | Facility: HOME HEALTH | Age: 81
End: 2021-07-26
Payer: MEDICARE

## 2021-07-26 VITALS
SYSTOLIC BLOOD PRESSURE: 124 MMHG | TEMPERATURE: 98 F | RESPIRATION RATE: 18 BRPM | HEART RATE: 66 BPM | DIASTOLIC BLOOD PRESSURE: 73 MMHG | OXYGEN SATURATION: 98 %

## 2021-07-26 PROCEDURE — 400013 HH SOC

## 2021-07-26 PROCEDURE — 3331090001 HH PPS REVENUE CREDIT

## 2021-07-26 PROCEDURE — G0299 HHS/HOSPICE OF RN EA 15 MIN: HCPCS

## 2021-07-26 PROCEDURE — 3331090002 HH PPS REVENUE DEBIT

## 2021-07-27 ENCOUNTER — HOSPITAL ENCOUNTER (OUTPATIENT)
Dept: SURGERY | Age: 81
Discharge: HOME | End: 2021-07-27
Payer: MEDICARE

## 2021-07-27 VITALS
RESPIRATION RATE: 16 BRPM | OXYGEN SATURATION: 99 % | DIASTOLIC BLOOD PRESSURE: 98 MMHG | SYSTOLIC BLOOD PRESSURE: 155 MMHG | HEIGHT: 70 IN | BODY MASS INDEX: 24.55 KG/M2 | HEART RATE: 69 BPM | WEIGHT: 171.5 LBS | TEMPERATURE: 98.6 F

## 2021-07-27 LAB
ANION GAP SERPL CALC-SCNC: 4 MMOL/L (ref 7–16)
APPEARANCE UR: ABNORMAL
ATRIAL RATE: 51 BPM
BACTERIA URNS QL MICRO: ABNORMAL /HPF
BILIRUB UR QL: NEGATIVE
BUN SERPL-MCNC: 14 MG/DL (ref 8–23)
CALCIUM SERPL-MCNC: 8.9 MG/DL (ref 8.3–10.4)
CALCULATED P AXIS, ECG09: 67 DEGREES
CALCULATED R AXIS, ECG10: 66 DEGREES
CALCULATED T AXIS, ECG11: 89 DEGREES
CHLORIDE SERPL-SCNC: 108 MMOL/L (ref 98–107)
CO2 SERPL-SCNC: 28 MMOL/L (ref 21–32)
COLOR UR: YELLOW
CREAT SERPL-MCNC: 1.09 MG/DL (ref 0.8–1.5)
DIAGNOSIS, 93000: NORMAL
EPI CELLS #/AREA URNS HPF: ABNORMAL /HPF
ERYTHROCYTE [DISTWIDTH] IN BLOOD BY AUTOMATED COUNT: 13.3 % (ref 11.9–14.6)
GLUCOSE SERPL-MCNC: 96 MG/DL (ref 65–100)
GLUCOSE UR STRIP.AUTO-MCNC: NEGATIVE MG/DL
HCT VFR BLD AUTO: 34.6 % (ref 41.1–50.3)
HGB BLD-MCNC: 11.4 G/DL (ref 13.6–17.2)
HGB UR QL STRIP: ABNORMAL
KETONES UR QL STRIP.AUTO: NEGATIVE MG/DL
LEUKOCYTE ESTERASE UR QL STRIP.AUTO: ABNORMAL
MCH RBC QN AUTO: 30.7 PG (ref 26.1–32.9)
MCHC RBC AUTO-ENTMCNC: 32.9 G/DL (ref 31.4–35)
MCV RBC AUTO: 93.3 FL (ref 79.6–97.8)
NITRITE UR QL STRIP.AUTO: POSITIVE
NRBC # BLD: 0 K/UL (ref 0–0.2)
OTHER OBSERVATIONS,UCOM: ABNORMAL
P-R INTERVAL, ECG05: 202 MS
PH UR STRIP: 6 [PH] (ref 5–9)
PLATELET # BLD AUTO: 157 K/UL (ref 150–450)
PMV BLD AUTO: 9.7 FL (ref 9.4–12.3)
POTASSIUM SERPL-SCNC: 3.8 MMOL/L (ref 3.5–5.1)
PROT UR STRIP-MCNC: NEGATIVE MG/DL
Q-T INTERVAL, ECG07: 472 MS
QRS DURATION, ECG06: 112 MS
QTC CALCULATION (BEZET), ECG08: 435 MS
RBC # BLD AUTO: 3.71 M/UL (ref 4.23–5.6)
RBC #/AREA URNS HPF: ABNORMAL /HPF
SODIUM SERPL-SCNC: 140 MMOL/L (ref 136–145)
SP GR UR REFRACTOMETRY: 1.01 (ref 1–1.02)
UROBILINOGEN UR QL STRIP.AUTO: 0.2 EU/DL (ref 0.2–1)
VENTRICULAR RATE, ECG03: 51 BPM
WBC # BLD AUTO: 6.2 K/UL (ref 4.3–11.1)
WBC URNS QL MICRO: ABNORMAL /HPF

## 2021-07-27 PROCEDURE — 80048 BASIC METABOLIC PNL TOTAL CA: CPT

## 2021-07-27 PROCEDURE — 85027 COMPLETE CBC AUTOMATED: CPT

## 2021-07-27 PROCEDURE — 87086 URINE CULTURE/COLONY COUNT: CPT

## 2021-07-27 PROCEDURE — 81001 URINALYSIS AUTO W/SCOPE: CPT

## 2021-07-27 PROCEDURE — 3331090002 HH PPS REVENUE DEBIT

## 2021-07-27 PROCEDURE — 3331090001 HH PPS REVENUE CREDIT

## 2021-07-27 PROCEDURE — 93005 ELECTROCARDIOGRAM TRACING: CPT

## 2021-07-27 PROCEDURE — 87088 URINE BACTERIA CULTURE: CPT

## 2021-07-27 PROCEDURE — 87186 SC STD MICRODIL/AGAR DIL: CPT

## 2021-07-27 NOTE — PERIOP NOTES
Patient verified name and     Order for consent was found in EHR and matches case posting; patient verified. Type II surgery, walk-in assessment complete. Labs per surgeon: CBC w/o, BMP, Type & Screen, UA w/ RFLX, Urine culture  Labs per anesthesia protocol: no additional  EKG: done today and acceptable per anesthesia protocol    Patient COVID test not required, Patient states fully vaccinated with [facility] and second dose receivd greater than 2 weeks ago, Instructed to bring vaccine card to hospital to be scanned into [facility] approved surgical skin cleanser and instructions given per hospital policy. Patient provided with and instructed on educational handouts including Guide to Surgery, Pain Management, Hand Hygiene, Blood Transfusion Education, and [facility] [facility] Brochure. Patient answered medical/surgical history questions at their best of ability. All prior to admission medications documented in [facility]. Original medication prescription bottle not visualized during patient appointment. Patient instructed to hold all vitamins 7 days prior to surgery and NSAIDS 5 days prior to surgery, patient verbalized understanding. Patient teach back successful and patient demonstrates knowledge of instructions.

## 2021-07-27 NOTE — PERIOP NOTES
Recent Results (from the past 12 hour(s))   CBC W/O DIFF    Collection Time: 07/27/21  1:51 PM   Result Value Ref Range    WBC 6.2 4.3 - 11.1 K/uL    RBC 3.71 (L) 4.23 - 5.6 M/uL    HGB 11.4 (L) 13.6 - 17.2 g/dL    HCT 34.6 (L) 41.1 - 50.3 %    MCV 93.3 79.6 - 97.8 FL    MCH 30.7 26.1 - 32.9 PG    MCHC 32.9 31.4 - 35.0 g/dL    RDW 13.3 11.9 - 14.6 %    PLATELET 463 671 - 271 K/uL    MPV 9.7 9.4 - 12.3 FL    ABSOLUTE NRBC 0.00 0.0 - 0.2 K/uL   METABOLIC PANEL, BASIC    Collection Time: 07/27/21  1:51 PM   Result Value Ref Range    Sodium 140 136 - 145 mmol/L    Potassium 3.8 3.5 - 5.1 mmol/L    Chloride 108 (H) 98 - 107 mmol/L    CO2 28 21 - 32 mmol/L    Anion gap 4 (L) 7 - 16 mmol/L    Glucose 96 65 - 100 mg/dL    BUN 14 8 - 23 MG/DL    Creatinine 1.09 0.8 - 1.5 MG/DL    GFR est AA >60 >60 ml/min/1.73m2    GFR est non-AA >60 >60 ml/min/1.73m2    Calcium 8.9 8.3 - 10.4 MG/DL   URINALYSIS W/ RFLX MICROSCOPIC    Collection Time: 07/27/21  1:51 PM   Result Value Ref Range    Color YELLOW      Appearance CLOUDY      Specific gravity 1.006 1.001 - 1.023      pH (UA) 6.0 5.0 - 9.0      Protein Negative NEG mg/dL    Glucose Negative mg/dL    Ketone Negative NEG mg/dL    Bilirubin Negative NEG      Blood SMALL (A) NEG      Urobilinogen 0.2 0.2 - 1.0 EU/dL    Nitrites Positive (A) NEG      Leukocyte Esterase MODERATE (A) NEG      WBC 10-20 0 /hpf    RBC 0-3 0 /hpf    Epithelial cells 0-3 0 /hpf    Bacteria 2+ (H) 0 /hpf    Other observations RESULTS VERIFIED MANUALLY     EKG, 12 LEAD, INITIAL    Collection Time: 07/27/21  2:07 PM   Result Value Ref Range    Ventricular Rate 51 BPM    Atrial Rate 51 BPM    P-R Interval 202 ms    QRS Duration 112 ms    Q-T Interval 472 ms    QTC Calculation (Bezet) 435 ms    Calculated P Axis 67 degrees    Calculated R Axis 66 degrees    Calculated T Axis 89 degrees    Diagnosis       Sinus bradycardia  Incomplete right bundle branch block  Nonspecific ST abnormality  Abnormal ECG  When compared with ECG of 18-JUN-2021 14:04,  Premature atrial complexes are no longer Present  Vent. rate has decreased BY  27 BPM     Labs reviewed. Abnormal values E-mailed to [employer]. Labs routed to Dr. David Torres office. Chart  placed to follow up on urine culture.

## 2021-07-27 NOTE — PERIOP NOTES
PLEASE CONTINUE TAKING ALL PRESCRIPTION MEDICATIONS UP TO THE DAY OF SURGERY UNLESS OTHERWISE DIRECTED BELOW. DISCONTINUE all vitamins and supplements 7 days prior to surgery. DISCONTINUE Non-Steriodal Anti-Inflammatory (NSAIDS) such as Advil and Aleve 5 days prior to surgery. Home Medications to take  the day of surgery    Fluoxetine (Prozac)             Home Medications   to Hold   Losartan (Cozaar) hold morning of surgery   Holding Aspirin per Surgeon's Instruction     Comments      On the day before surgery please take Acetaminophen 1000mg in the morning and then again before bed. You may substitute for Tylenol 650 mg. Please do not bring home medications with you on the day of surgery unless otherwise directed by your nurse. If you are instructed to bring home medications, please give them to your nurse as they will be administered by the nursing staff. If you have any questions, please call [facility] [phone] or [facility] [phone]. A copy of this note was provided to the patient for reference.

## 2021-07-28 PROCEDURE — 3331090001 HH PPS REVENUE CREDIT

## 2021-07-28 PROCEDURE — 3331090002 HH PPS REVENUE DEBIT

## 2021-07-29 PROCEDURE — 3331090001 HH PPS REVENUE CREDIT

## 2021-07-29 PROCEDURE — 3331090002 HH PPS REVENUE DEBIT

## 2021-07-29 NOTE — PERIOP NOTES
CULTURE, URINE [[id number]] (Order [id number])  Microbiology  Date: 7/27/2021 Department: [facility] Pre Assessment Released By: Nela Raymond RN (auto-released) Authorizing: Beatriz Degroot MD   Contains abnormal data CULTURE, URINE  Order: [id number]  Collected:  7/27/2021 13:51 Status:  Preliminary result  Specimen Information: Clean catch; Urine         0 Result Notes   Ref Range & Units 7/27/21 1351 6/21/21 0933 6/21/21 0449 6/21/21 0441 6/18/21 1628 6/18/21 1404 6/18/21 1355   Special Requests:   NO SPECIAL REQUESTS P  NO SPECIAL REQUESTS  RIGHT   HAND  LEFT   Antecubital  NO SPECIAL REQUESTS   LEFT   Antecubital  NO SPECIAL REQUESTS  RIGHT   Antecubital    Culture result:   >100,000 COLONIES/mL GRAM NEGATIVE RODS IDENTIFICATION AND SUSCEPTIBILITY TO FOLLOWAbnormal  P  NO GROWTH 2 DAYS  NO GROWTH 5 DAYS  NO GROWTH 5 DAYS  NO GROWTH 5 DAYS  >100,000 COLONIES/mL ESCHERICHIA COLIAbnormal   ESCHERICHIA COLIAbnormal     Culture result:   CULTURE IN PROGRESS,FURTHER UPDATES TO FOLLOW P      50,000-100,000 COLONIES/mL KLEBSIELLA PNEUMONIAEAbnormal   Refer to Blood Culture ID Panel Accession   [id number]    Culture result:   10,000 to 50,000 COLONIES/mL MIXED SKIN OFE ISOLATED P      <10,000 COLONIES/mL MIXED SKIN OFE ISOLATED     Resulting Agency  [address] [address] [address] [address], [place] [address], [place] [address], [place] [address]         Specimen Collected: 07/27/21 13:51 Last Resulted: 07/29/21 10:06        Order Details      Lab and Collection Details      Routing      Result History        P=Value has a preliminary status           Specimen Information    Clean catch     Collected: 7/27/2021 1351      Final Report Date/time    Reported date Reported time   Jul 29, 2021 10:06 EDT          Provider Information

## 2021-07-30 PROCEDURE — 3331090002 HH PPS REVENUE DEBIT

## 2021-07-30 PROCEDURE — 3331090001 HH PPS REVENUE CREDIT

## 2021-07-31 LAB
BACTERIA SPEC CULT: ABNORMAL
SERVICE CMNT-IMP: ABNORMAL

## 2021-07-31 PROCEDURE — 3331090002 HH PPS REVENUE DEBIT

## 2021-07-31 PROCEDURE — 3331090001 HH PPS REVENUE CREDIT

## 2021-08-01 PROCEDURE — 3331090002 HH PPS REVENUE DEBIT

## 2021-08-01 PROCEDURE — 3331090001 HH PPS REVENUE CREDIT

## 2021-08-02 ENCOUNTER — ANESTHESIA EVENT (OUTPATIENT)
Dept: SURGERY | Age: 81
DRG: 694 | End: 2021-08-02
Payer: MEDICARE

## 2021-08-02 PROCEDURE — 3331090002 HH PPS REVENUE DEBIT

## 2021-08-02 PROCEDURE — 3331090001 HH PPS REVENUE CREDIT

## 2021-08-03 ENCOUNTER — HOSPITAL ENCOUNTER (INPATIENT)
Age: 81
LOS: 1 days | Discharge: HOME HEALTH | DRG: 694 | End: 2021-08-07
Attending: UROLOGY | Admitting: UROLOGY
Payer: MEDICARE

## 2021-08-03 ENCOUNTER — ANESTHESIA (OUTPATIENT)
Dept: SURGERY | Age: 81
DRG: 694 | End: 2021-08-03
Payer: MEDICARE

## 2021-08-03 ENCOUNTER — APPOINTMENT (OUTPATIENT)
Dept: GENERAL RADIOLOGY | Age: 81
DRG: 694 | End: 2021-08-03
Attending: UROLOGY
Payer: MEDICARE

## 2021-08-03 ENCOUNTER — HOSPITAL ENCOUNTER (OUTPATIENT)
Dept: INTERVENTIONAL RADIOLOGY/VASCULAR | Age: 81
Discharge: HOME | DRG: 694 | End: 2021-08-03
Attending: UROLOGY
Payer: MEDICARE

## 2021-08-03 VITALS
OXYGEN SATURATION: 91 % | HEART RATE: 68 BPM | SYSTOLIC BLOOD PRESSURE: 123 MMHG | RESPIRATION RATE: 16 BRPM | DIASTOLIC BLOOD PRESSURE: 65 MMHG | TEMPERATURE: 98.3 F

## 2021-08-03 DIAGNOSIS — N20.0 KIDNEY STONE: ICD-10-CM

## 2021-08-03 DIAGNOSIS — N20.1 URETERAL STONE: ICD-10-CM

## 2021-08-03 LAB
ABO + RH BLD: NORMAL
BLOOD GROUP ANTIBODIES SERPL: NORMAL
SPECIMEN EXP DATE BLD: NORMAL

## 2021-08-03 PROCEDURE — 77030030799 HC PRB LITHO OCOA -F: Performed by: UROLOGY

## 2021-08-03 PROCEDURE — 99152 MOD SED SAME PHYS/QHP 5/>YRS: CPT

## 2021-08-03 PROCEDURE — 77030013058 HC DEV INFL ANGIO BSC -B: Performed by: UROLOGY

## 2021-08-03 PROCEDURE — 77030037088 HC TUBE ENDOTRACH ORAL NSL COVD-A: Performed by: REGISTERED NURSE

## 2021-08-03 PROCEDURE — 74011250636 HC RX REV CODE- 250/636: Performed by: ANESTHESIOLOGY

## 2021-08-03 PROCEDURE — 74011250637 HC RX REV CODE- 250/637: Performed by: UROLOGY

## 2021-08-03 PROCEDURE — C1769 GUIDE WIRE: HCPCS | Performed by: UROLOGY

## 2021-08-03 PROCEDURE — 77030019908 HC STETH ESOPH SIMS -A: Performed by: REGISTERED NURSE

## 2021-08-03 PROCEDURE — 77030040830 HC CATH URETH FOL MDII -A: Performed by: UROLOGY

## 2021-08-03 PROCEDURE — 2709999900 HC NON-CHARGEABLE SUPPLY: Performed by: UROLOGY

## 2021-08-03 PROCEDURE — 99218 HC RM OBSERVATION: CPT

## 2021-08-03 PROCEDURE — 3331090001 HH PPS REVENUE CREDIT

## 2021-08-03 PROCEDURE — 76010000153 HC OR TIME 1.5 TO 2 HR: Performed by: UROLOGY

## 2021-08-03 PROCEDURE — 74420 UROGRAPHY RTRGR +-KUB: CPT | Performed by: UROLOGY

## 2021-08-03 PROCEDURE — C1894 INTRO/SHEATH, NON-LASER: HCPCS | Performed by: UROLOGY

## 2021-08-03 PROCEDURE — 74011000636 HC RX REV CODE- 636: Performed by: RADIOLOGY

## 2021-08-03 PROCEDURE — 50080 PERQ NL/PL LITHOTRP SMPL<2CM: CPT | Performed by: UROLOGY

## 2021-08-03 PROCEDURE — 3331090002 HH PPS REVENUE DEBIT

## 2021-08-03 PROCEDURE — 77030019927 HC TBNG IRR CYSTO BAXT -A: Performed by: UROLOGY

## 2021-08-03 PROCEDURE — 77030040831 HC BAG URINE DRNG MDII -A: Performed by: UROLOGY

## 2021-08-03 PROCEDURE — C1769 GUIDE WIRE: HCPCS

## 2021-08-03 PROCEDURE — C1894 INTRO/SHEATH, NON-LASER: HCPCS

## 2021-08-03 PROCEDURE — BT1F1ZZ FLUOROSCOPY OF LEFT KIDNEY, URETER AND BLADDER USING LOW OSMOLAR CONTRAST: ICD-10-PCS | Performed by: UROLOGY

## 2021-08-03 PROCEDURE — 77030039425 HC BLD LARYNG TRULITE DISP TELE -A: Performed by: REGISTERED NURSE

## 2021-08-03 PROCEDURE — 74011250637 HC RX REV CODE- 250/637: Performed by: ANESTHESIOLOGY

## 2021-08-03 PROCEDURE — 74011000250 HC RX REV CODE- 250: Performed by: RADIOLOGY

## 2021-08-03 PROCEDURE — 74011000250 HC RX REV CODE- 250: Performed by: REGISTERED NURSE

## 2021-08-03 PROCEDURE — 74011250636 HC RX REV CODE- 250/636: Performed by: RADIOLOGY

## 2021-08-03 PROCEDURE — 77030040922 HC BLNKT HYPOTHRM STRY -A: Performed by: REGISTERED NURSE

## 2021-08-03 PROCEDURE — 76210000006 HC OR PH I REC 0.5 TO 1 HR: Performed by: UROLOGY

## 2021-08-03 PROCEDURE — 76060000034 HC ANESTHESIA 1.5 TO 2 HR: Performed by: UROLOGY

## 2021-08-03 PROCEDURE — 50431 NJX PX NFROSGRM &/URTRGRM: CPT | Performed by: UROLOGY

## 2021-08-03 PROCEDURE — 77030018673: Performed by: UROLOGY

## 2021-08-03 PROCEDURE — C1726 CATH, BAL DIL, NON-VASCULAR: HCPCS | Performed by: UROLOGY

## 2021-08-03 PROCEDURE — 74011000636 HC RX REV CODE- 636: Performed by: UROLOGY

## 2021-08-03 PROCEDURE — 77030021532 HC CATH ANGI DX IMPRS MRTM -B

## 2021-08-03 PROCEDURE — 77030005518 HC CATH URETH FOL 2W BARD -B: Performed by: UROLOGY

## 2021-08-03 PROCEDURE — 74011250636 HC RX REV CODE- 250/636: Performed by: UROLOGY

## 2021-08-03 PROCEDURE — 77030002916 HC SUT ETHLN J&J -A: Performed by: UROLOGY

## 2021-08-03 PROCEDURE — 74011250636 HC RX REV CODE- 250/636: Performed by: REGISTERED NURSE

## 2021-08-03 PROCEDURE — 74011000250 HC RX REV CODE- 250: Performed by: UROLOGY

## 2021-08-03 PROCEDURE — 86901 BLOOD TYPING SEROLOGIC RH(D): CPT

## 2021-08-03 PROCEDURE — 0T9430Z DRAINAGE OF LEFT KIDNEY PELVIS WITH DRAINAGE DEVICE, PERCUTANEOUS APPROACH: ICD-10-PCS | Performed by: UROLOGY

## 2021-08-03 PROCEDURE — C1758 CATHETER, URETERAL: HCPCS | Performed by: UROLOGY

## 2021-08-03 PROCEDURE — 77030020788: Performed by: UROLOGY

## 2021-08-03 PROCEDURE — 77030038846 HC SCPE URETSCP LITHOVUE DISP BSC -H: Performed by: UROLOGY

## 2021-08-03 PROCEDURE — 77030040361 HC SLV COMPR DVT MDII -B: Performed by: UROLOGY

## 2021-08-03 PROCEDURE — 77030033648: Performed by: UROLOGY

## 2021-08-03 RX ORDER — HYDROMORPHONE HYDROCHLORIDE 2 MG/ML
0.5 INJECTION, SOLUTION INTRAMUSCULAR; INTRAVENOUS; SUBCUTANEOUS
Status: DISCONTINUED | OUTPATIENT
Start: 2021-08-03 | End: 2021-08-03 | Stop reason: HOSPADM

## 2021-08-03 RX ORDER — LIDOCAINE HYDROCHLORIDE 20 MG/ML
INJECTION, SOLUTION EPIDURAL; INFILTRATION; INTRACAUDAL; PERINEURAL AS NEEDED
Status: DISCONTINUED | OUTPATIENT
Start: 2021-08-03 | End: 2021-08-03 | Stop reason: HOSPADM

## 2021-08-03 RX ORDER — SODIUM CHLORIDE, SODIUM LACTATE, POTASSIUM CHLORIDE, CALCIUM CHLORIDE 600; 310; 30; 20 MG/100ML; MG/100ML; MG/100ML; MG/100ML
50 INJECTION, SOLUTION INTRAVENOUS CONTINUOUS
Status: DISCONTINUED | OUTPATIENT
Start: 2021-08-03 | End: 2021-08-03 | Stop reason: HOSPADM

## 2021-08-03 RX ORDER — GENTAMICIN SULFATE 80 MG/100ML
INJECTION, SOLUTION INTRAVENOUS AS NEEDED
Status: DISCONTINUED | OUTPATIENT
Start: 2021-08-03 | End: 2021-08-03 | Stop reason: HOSPADM

## 2021-08-03 RX ORDER — FENTANYL CITRATE 50 UG/ML
25-100 INJECTION, SOLUTION INTRAMUSCULAR; INTRAVENOUS
Status: DISCONTINUED | OUTPATIENT
Start: 2021-08-03 | End: 2021-08-03

## 2021-08-03 RX ORDER — SODIUM CHLORIDE 0.9 % (FLUSH) 0.9 %
5-40 SYRINGE (ML) INJECTION EVERY 8 HOURS
Status: DISCONTINUED | OUTPATIENT
Start: 2021-08-03 | End: 2021-08-07 | Stop reason: HOSPADM

## 2021-08-03 RX ORDER — SULFAMETHOXAZOLE AND TRIMETHOPRIM 800; 160 MG/1; MG/1
1 TABLET ORAL 2 TIMES DAILY
Status: COMPLETED | OUTPATIENT
Start: 2021-08-03 | End: 2021-08-06

## 2021-08-03 RX ORDER — LIDOCAINE HYDROCHLORIDE 10 MG/ML
0.1 INJECTION INFILTRATION; PERINEURAL AS NEEDED
Status: DISCONTINUED | OUTPATIENT
Start: 2021-08-03 | End: 2021-08-03 | Stop reason: HOSPADM

## 2021-08-03 RX ORDER — LOSARTAN POTASSIUM 50 MG/1
100 TABLET ORAL DAILY
Status: DISCONTINUED | OUTPATIENT
Start: 2021-08-04 | End: 2021-08-07 | Stop reason: HOSPADM

## 2021-08-03 RX ORDER — ALBUTEROL SULFATE 0.83 MG/ML
2.5 SOLUTION RESPIRATORY (INHALATION) AS NEEDED
Status: DISCONTINUED | OUTPATIENT
Start: 2021-08-03 | End: 2021-08-03 | Stop reason: HOSPADM

## 2021-08-03 RX ORDER — LIDOCAINE HYDROCHLORIDE 20 MG/ML
20-300 INJECTION, SOLUTION INFILTRATION; PERINEURAL
Status: DISCONTINUED | OUTPATIENT
Start: 2021-08-03 | End: 2021-08-03

## 2021-08-03 RX ORDER — GLYCOPYRROLATE 0.2 MG/ML
INJECTION INTRAMUSCULAR; INTRAVENOUS AS NEEDED
Status: DISCONTINUED | OUTPATIENT
Start: 2021-08-03 | End: 2021-08-03 | Stop reason: HOSPADM

## 2021-08-03 RX ORDER — CIPROFLOXACIN 2 MG/ML
400 INJECTION, SOLUTION INTRAVENOUS ONCE
Status: COMPLETED | OUTPATIENT
Start: 2021-08-03 | End: 2021-08-03

## 2021-08-03 RX ORDER — OXYCODONE HYDROCHLORIDE 5 MG/1
5 TABLET ORAL
Status: COMPLETED | OUTPATIENT
Start: 2021-08-03 | End: 2021-08-03

## 2021-08-03 RX ORDER — ASPIRIN 81 MG/1
81 TABLET ORAL DAILY
Status: DISCONTINUED | OUTPATIENT
Start: 2021-08-04 | End: 2021-08-07 | Stop reason: HOSPADM

## 2021-08-03 RX ORDER — CEFAZOLIN SODIUM/WATER 2 G/20 ML
2 SYRINGE (ML) INTRAVENOUS ONCE
Status: COMPLETED | OUTPATIENT
Start: 2021-08-03 | End: 2021-08-03

## 2021-08-03 RX ORDER — ONDANSETRON 4 MG/1
4 TABLET, ORALLY DISINTEGRATING ORAL
Status: DISCONTINUED | OUTPATIENT
Start: 2021-08-03 | End: 2021-08-07 | Stop reason: HOSPADM

## 2021-08-03 RX ORDER — ONDANSETRON 2 MG/ML
INJECTION INTRAMUSCULAR; INTRAVENOUS AS NEEDED
Status: DISCONTINUED | OUTPATIENT
Start: 2021-08-03 | End: 2021-08-03 | Stop reason: HOSPADM

## 2021-08-03 RX ORDER — ROCURONIUM BROMIDE 10 MG/ML
INJECTION, SOLUTION INTRAVENOUS AS NEEDED
Status: DISCONTINUED | OUTPATIENT
Start: 2021-08-03 | End: 2021-08-03 | Stop reason: HOSPADM

## 2021-08-03 RX ORDER — SODIUM CHLORIDE, SODIUM LACTATE, POTASSIUM CHLORIDE, CALCIUM CHLORIDE 600; 310; 30; 20 MG/100ML; MG/100ML; MG/100ML; MG/100ML
75 INJECTION, SOLUTION INTRAVENOUS CONTINUOUS
Status: DISCONTINUED | OUTPATIENT
Start: 2021-08-03 | End: 2021-08-03 | Stop reason: HOSPADM

## 2021-08-03 RX ORDER — MIDAZOLAM HYDROCHLORIDE 1 MG/ML
2 INJECTION, SOLUTION INTRAMUSCULAR; INTRAVENOUS ONCE
Status: DISCONTINUED | OUTPATIENT
Start: 2021-08-03 | End: 2021-08-03 | Stop reason: HOSPADM

## 2021-08-03 RX ORDER — ONDANSETRON 2 MG/ML
4 INJECTION INTRAMUSCULAR; INTRAVENOUS
Status: DISCONTINUED | OUTPATIENT
Start: 2021-08-03 | End: 2021-08-07 | Stop reason: HOSPADM

## 2021-08-03 RX ORDER — DEXAMETHASONE SODIUM PHOSPHATE 4 MG/ML
INJECTION, SOLUTION INTRA-ARTICULAR; INTRALESIONAL; INTRAMUSCULAR; INTRAVENOUS; SOFT TISSUE AS NEEDED
Status: DISCONTINUED | OUTPATIENT
Start: 2021-08-03 | End: 2021-08-03 | Stop reason: HOSPADM

## 2021-08-03 RX ORDER — FLUOXETINE HYDROCHLORIDE 20 MG/1
20 CAPSULE ORAL DAILY
Status: DISCONTINUED | OUTPATIENT
Start: 2021-08-04 | End: 2021-08-07 | Stop reason: HOSPADM

## 2021-08-03 RX ORDER — ACETAMINOPHEN 500 MG
1000 TABLET ORAL ONCE
Status: COMPLETED | OUTPATIENT
Start: 2021-08-03 | End: 2021-08-03

## 2021-08-03 RX ORDER — NEOSTIGMINE METHYLSULFATE 1 MG/ML
INJECTION, SOLUTION INTRAVENOUS AS NEEDED
Status: DISCONTINUED | OUTPATIENT
Start: 2021-08-03 | End: 2021-08-03 | Stop reason: HOSPADM

## 2021-08-03 RX ORDER — EPHEDRINE SULFATE/0.9% NACL/PF 50 MG/5 ML
SYRINGE (ML) INTRAVENOUS AS NEEDED
Status: DISCONTINUED | OUTPATIENT
Start: 2021-08-03 | End: 2021-08-03 | Stop reason: HOSPADM

## 2021-08-03 RX ORDER — PROPOFOL 10 MG/ML
INJECTION, EMULSION INTRAVENOUS AS NEEDED
Status: DISCONTINUED | OUTPATIENT
Start: 2021-08-03 | End: 2021-08-03 | Stop reason: HOSPADM

## 2021-08-03 RX ORDER — MIDAZOLAM HYDROCHLORIDE 1 MG/ML
.5-2 INJECTION, SOLUTION INTRAMUSCULAR; INTRAVENOUS
Status: DISCONTINUED | OUTPATIENT
Start: 2021-08-03 | End: 2021-08-03

## 2021-08-03 RX ORDER — OXYCODONE HYDROCHLORIDE 5 MG/1
5 TABLET ORAL
Status: DISCONTINUED | OUTPATIENT
Start: 2021-08-03 | End: 2021-08-07 | Stop reason: HOSPADM

## 2021-08-03 RX ORDER — OXYCODONE HYDROCHLORIDE 5 MG/1
10 TABLET ORAL
Status: DISCONTINUED | OUTPATIENT
Start: 2021-08-03 | End: 2021-08-07 | Stop reason: HOSPADM

## 2021-08-03 RX ORDER — DEXTROSE MONOHYDRATE AND SODIUM CHLORIDE 5; .45 G/100ML; G/100ML
75 INJECTION, SOLUTION INTRAVENOUS CONTINUOUS
Status: DISCONTINUED | OUTPATIENT
Start: 2021-08-03 | End: 2021-08-05

## 2021-08-03 RX ORDER — FENTANYL CITRATE 50 UG/ML
100 INJECTION, SOLUTION INTRAMUSCULAR; INTRAVENOUS ONCE
Status: DISCONTINUED | OUTPATIENT
Start: 2021-08-03 | End: 2021-08-03 | Stop reason: HOSPADM

## 2021-08-03 RX ORDER — MIDAZOLAM HYDROCHLORIDE 1 MG/ML
2 INJECTION, SOLUTION INTRAMUSCULAR; INTRAVENOUS
Status: DISCONTINUED | OUTPATIENT
Start: 2021-08-03 | End: 2021-08-03 | Stop reason: HOSPADM

## 2021-08-03 RX ORDER — SODIUM CHLORIDE 0.9 % (FLUSH) 0.9 %
5-40 SYRINGE (ML) INJECTION AS NEEDED
Status: DISCONTINUED | OUTPATIENT
Start: 2021-08-03 | End: 2021-08-07 | Stop reason: HOSPADM

## 2021-08-03 RX ORDER — FENTANYL CITRATE 50 UG/ML
INJECTION, SOLUTION INTRAMUSCULAR; INTRAVENOUS AS NEEDED
Status: DISCONTINUED | OUTPATIENT
Start: 2021-08-03 | End: 2021-08-03 | Stop reason: HOSPADM

## 2021-08-03 RX ORDER — ACETAMINOPHEN 325 MG/1
650 TABLET ORAL
Status: DISCONTINUED | OUTPATIENT
Start: 2021-08-03 | End: 2021-08-07 | Stop reason: HOSPADM

## 2021-08-03 RX ORDER — SODIUM CHLORIDE 9 MG/ML
500 INJECTION, SOLUTION INTRAVENOUS CONTINUOUS
Status: DISCONTINUED | OUTPATIENT
Start: 2021-08-03 | End: 2021-08-03

## 2021-08-03 RX ADMIN — FENTANYL CITRATE 50 MCG: 50 INJECTION, SOLUTION INTRAMUSCULAR; INTRAVENOUS at 11:51

## 2021-08-03 RX ADMIN — Medication 1 MG: at 15:41

## 2021-08-03 RX ADMIN — Medication 5 MG: at 14:57

## 2021-08-03 RX ADMIN — GLYCOPYRROLATE 0.2 MG: 0.2 INJECTION, SOLUTION INTRAMUSCULAR; INTRAVENOUS at 15:42

## 2021-08-03 RX ADMIN — GENTAMICIN SULFATE 80 MG: 80 INJECTION, SOLUTION INTRAVENOUS at 15:10

## 2021-08-03 RX ADMIN — Medication 1 MG: at 15:40

## 2021-08-03 RX ADMIN — FENTANYL CITRATE 100 MCG: 50 INJECTION INTRAMUSCULAR; INTRAVENOUS at 14:27

## 2021-08-03 RX ADMIN — GLYCOPYRROLATE 0.4 MG: 0.2 INJECTION, SOLUTION INTRAMUSCULAR; INTRAVENOUS at 15:37

## 2021-08-03 RX ADMIN — CEFAZOLIN 2 G: 1 INJECTION, POWDER, FOR SOLUTION INTRAVENOUS at 14:45

## 2021-08-03 RX ADMIN — PROPOFOL 170 MG: 10 INJECTION, EMULSION INTRAVENOUS at 14:27

## 2021-08-03 RX ADMIN — LIDOCAINE HYDROCHLORIDE 140 MG: 20 INJECTION, SOLUTION INFILTRATION; PERINEURAL at 11:56

## 2021-08-03 RX ADMIN — Medication 10 ML: at 21:34

## 2021-08-03 RX ADMIN — MIDAZOLAM 1 MG: 1 INJECTION INTRAMUSCULAR; INTRAVENOUS at 11:51

## 2021-08-03 RX ADMIN — IOPAMIDOL 20 ML: 612 INJECTION, SOLUTION INTRAVENOUS at 12:06

## 2021-08-03 RX ADMIN — ACETAMINOPHEN 1000 MG: 500 TABLET ORAL at 08:50

## 2021-08-03 RX ADMIN — Medication 1 MG: at 15:42

## 2021-08-03 RX ADMIN — SODIUM CHLORIDE, SODIUM LACTATE, POTASSIUM CHLORIDE, AND CALCIUM CHLORIDE: 600; 310; 30; 20 INJECTION, SOLUTION INTRAVENOUS at 14:43

## 2021-08-03 RX ADMIN — SULFAMETHOXAZOLE AND TRIMETHOPRIM 1 TABLET: 800; 160 TABLET ORAL at 19:29

## 2021-08-03 RX ADMIN — Medication 10 MG: at 14:42

## 2021-08-03 RX ADMIN — MIDAZOLAM 1 MG: 1 INJECTION INTRAMUSCULAR; INTRAVENOUS at 12:00

## 2021-08-03 RX ADMIN — GLYCOPYRROLATE 0.2 MG: 0.2 INJECTION, SOLUTION INTRAMUSCULAR; INTRAVENOUS at 15:41

## 2021-08-03 RX ADMIN — Medication 1 MG: at 15:38

## 2021-08-03 RX ADMIN — ROCURONIUM BROMIDE 50 MG: 10 INJECTION, SOLUTION INTRAVENOUS at 14:28

## 2021-08-03 RX ADMIN — SODIUM CHLORIDE, SODIUM LACTATE, POTASSIUM CHLORIDE, AND CALCIUM CHLORIDE 75 ML/HR: 600; 310; 30; 20 INJECTION, SOLUTION INTRAVENOUS at 08:50

## 2021-08-03 RX ADMIN — FENTANYL CITRATE 50 MCG: 50 INJECTION, SOLUTION INTRAMUSCULAR; INTRAVENOUS at 12:00

## 2021-08-03 RX ADMIN — Medication 1 MG: at 15:46

## 2021-08-03 RX ADMIN — DEXTROSE MONOHYDRATE AND SODIUM CHLORIDE 75 ML/HR: 5; .45 INJECTION, SOLUTION INTRAVENOUS at 19:35

## 2021-08-03 RX ADMIN — CIPROFLOXACIN 400 MG: 2 INJECTION, SOLUTION INTRAVENOUS at 11:28

## 2021-08-03 RX ADMIN — OXYCODONE 5 MG: 5 TABLET ORAL at 16:56

## 2021-08-03 RX ADMIN — ONDANSETRON 4 MG: 2 INJECTION INTRAMUSCULAR; INTRAVENOUS at 15:34

## 2021-08-03 RX ADMIN — LIDOCAINE HYDROCHLORIDE 60 MG: 20 INJECTION, SOLUTION EPIDURAL; INFILTRATION; INTRACAUDAL; PERINEURAL at 14:27

## 2021-08-03 RX ADMIN — DEXAMETHASONE SODIUM PHOSPHATE 4 MG: 4 INJECTION, SOLUTION INTRAMUSCULAR; INTRAVENOUS at 14:45

## 2021-08-03 NOTE — PROGRESS NOTES
's pre-procedure visit with Mr. Dorina Kaye caregiver Chiqui Waite in [address]. Patient was out of the unit. I offered prayer for patient, caregiver, and staff.      Alex Delgado 70  Board Certified

## 2021-08-03 NOTE — ANESTHESIA PREPROCEDURE EVALUATION
Relevant Problems   No relevant active problems       Anesthetic History   No history of anesthetic complications            Review of Systems / Medical History  Patient summary reviewed, nursing notes reviewed and pertinent labs reviewed    Pulmonary  Within defined limits                 Neuro/Psych         Psychiatric history     Cardiovascular    Hypertension: well controlled          CAD, CABG (2004) and hyperlipidemia    Exercise tolerance: >4 METS  Comments: Cath 2011- all grafts patent, normal EF- RX medical management    TTE June '21- EF 51%, no signif valve abnl   GI/Hepatic/Renal         Renal disease: CRI      Comments: S/o cystectomy with ileal conduit Endo/Other        Arthritis and cancer     Other Findings              Physical Exam    Airway  Mallampati: I  TM Distance: > 6 cm  Neck ROM: normal range of motion   Mouth opening: Normal     Cardiovascular    Rhythm: regular      Murmur: Grade 2, Tricuspid area     Dental  No notable dental hx       Pulmonary  Breath sounds clear to auscultation               Abdominal         Other Findings            Anesthetic Plan    ASA: 3  Anesthesia type: general          Induction: Intravenous  Anesthetic plan and risks discussed with: Patient and Family

## 2021-08-03 NOTE — H&P
Department of Interventional Radiology  [phone]    History and Physical    Patient:  Isaiah Mac MRN:  [id number]  SSN:  [id number]    YOB: 1940  Age:  80 y.o. Sex:  male      Primary Care Provider:  Son Zamorano MD  Referring Physician:  Marian Yost MD    Subjective:     Chief Complaint: stones    History of the Present Illness: The patient is a 80 y.o. male with hx cystoprostatectomy with ileal conduit who presents for nephrostogram, conversion of nephrostomy tube to nephroureteral drain prior to surgical removal of left ureteral stone. Nephrostomy tube placed 6/2021 for hydronephrosis with UTI. This has been functioning well. He completed a course of antibiotics about a week ago he tells me. NPO.        Past Medical History:   Diagnosis Date    Acute encephalopathy 6/18/2021    Arthritis     OA- bilat knees    Bladder absent     stoma present    Bladder cancer (HCC)     CAD (coronary artery disease)     CABG x 4 in 2004 - no damage- emergenc CABG- Cath 1/2011- total occlusion ramus, total mid occlusion left anterior descending, 50-70% stenosis of mid right coronary artery- \"Normal LEF\"    Cancer ([facility] [address] 1/1/2008    bladder- chemo in bladder then 7/11 bladder removed    CRI (chronic renal insufficiency), stage 3 (moderate) (HCC)     Depression     Family history of diabetes mellitus     Heart murmur     mild mitral regurgitation    Hypercholesteremia     Hypertension     Infection     to s/p hernia wound 5/0986    Other complication of colostomy or enterostomy     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    Followed by surgeon Dr Rodriguez Primus intestinal obstruction     Urinary tract infection 6/18/2021    Ventral hernia, unspecified, without mention of obstruction or gangrene      Past Surgical History:   Procedure Laterality Date    HX COLONOSCOPY  1/07    repeat 10 years    HX HEART CATHETERIZATION  2011    HX HERNIA REPAIR  8/26/11 hernia repair    HX HERNIA REPAIR  9/2011    9 days later emergency s/p hernia repair surg    HX VASECTOMY  1974    IR NEPHROSTOMY PERC LT PLC CATH  SI  6/21/2021    MI CARDIAC SURG PROCEDURE UNLIST  2004    CABG x 4    MI REMV BLADDER,TOTAL  7/2008    ostomy        Review of Systems:    Pertinent items are noted in the History of Present Illness. Prior to Admission medications    Medication Sig Start Date End Date Taking? Authorizing Provider   trimethoprim-sulfamethoxazole (Bactrim DS) 160-800 mg per tablet Take 1 Tablet by mouth two (2) times a day for 7 days. Patient not taking: Reported on 8/3/2021 7/30/21 8/6/21  Senthil Reeves MD   acetaminophen (TYLENOL) 500 mg tablet Take 500 mg by mouth every six (6) hours as needed for Pain. Take 1-2 tablets every 6 hrs as needed    Provider, Lisa   losartan (COZAAR) 100 mg tablet Take 1 Tab by mouth daily. 3/25/21   Charlette Mejia MD   FLUoxetine (PROzac) 20 mg capsule Take 1 Cap by mouth daily. 3/2/21   Hilton Toure MD   simvastatin (ZOCOR) 80 mg tablet TAKE 1 TABLET AT BEDTIME  Patient taking differently: Take 80 mg by mouth nightly. TAKE 1 TABLET AT BEDTIME 12/2/20   Hilton Toure MD   aspirin delayed-release 81 mg tablet Take 1 Tab by mouth daily.  Indications: MYOCARDIAL INFARCTION PREVENTION 6/6/13   Cachorro Alvarez MD        No Known Allergies    Family History   Problem Relation Age of Onset    Heart Disease Father 37        MI    Hypertension Father     Heart Disease Sister     Arthritis-osteo Sister     Cancer Mother         OVARY    Diabetes Sister     Hypertension Sister      Social History     Tobacco Use    Smoking status: Former Smoker    Smokeless tobacco: Never Used    Tobacco comment: smoked when in [employer]   Substance Use Topics    Alcohol use: No     Comment: rarely have glass of wine maybe one per month        Objective:       Physical Examination:    Vitals:    08/03/21 1027   BP: (!) 168/77   Pulse: (!) 51   Resp: 16 Temp: 98.3 °F (36.8 °C)   SpO2: 97%       Pain Assessment  Pain Intensity 1: 0 (08/03/21 1027)               HEART: regular rate and rhythm  LUNG: clear to auscultation bilaterally  ABDOMEN: normal findings: soft, non-tender  EXTREMITIES: warm, no edema    Laboratory:     Lab Results   Component Value Date/Time    Sodium 140 07/27/2021 01:51 PM    Sodium 141 07/02/2021 09:53 AM    Potassium 3.8 07/27/2021 01:51 PM    Potassium 4.5 07/02/2021 09:53 AM    Chloride 108 (H) 07/27/2021 01:51 PM    Chloride 103 07/02/2021 09:53 AM    CO2 28 07/27/2021 01:51 PM    CO2 27 07/02/2021 09:53 AM    Anion gap 4 (L) 07/27/2021 01:51 PM    Anion gap 5 (L) 06/22/2021 04:19 AM    Glucose 96 07/27/2021 01:51 PM    Glucose 97 07/02/2021 09:53 AM    BUN 14 07/27/2021 01:51 PM    BUN 20 07/02/2021 09:53 AM    Creatinine 1.09 07/27/2021 01:51 PM    Creatinine 1.33 (H) 07/02/2021 09:53 AM    GFR est AA >60 07/27/2021 01:51 PM    GFR est AA 59 (L) 07/02/2021 09:53 AM    GFR est non-AA >60 07/27/2021 01:51 PM    GFR est non-AA 51 (L) 07/02/2021 09:53 AM    Calcium 8.9 07/27/2021 01:51 PM    Calcium 9.3 07/02/2021 09:53 AM    Magnesium 1.8 06/19/2021 06:04 AM    Magnesium 2.2 09/02/2011 05:23 AM    Phosphorus 3.1 09/02/2011 05:23 AM    Phosphorus 7.9 (H) 08/18/2008 08:40 AM    Albumin 4.0 07/02/2021 09:53 AM    Albumin 2.6 (L) 06/19/2021 06:04 AM    Protein, total 6.9 07/02/2021 09:53 AM    Protein, total 6.0 (L) 06/19/2021 06:04 AM    Globulin 3.4 06/19/2021 06:04 AM    Globulin 3.9 (H) 06/18/2021 01:55 PM    A-G Ratio 0.8 (L) 06/19/2021 06:04 AM    A-G Ratio 0.9 (L) 06/18/2021 01:55 PM    ALT (SGPT) 19 07/02/2021 09:53 AM    ALT (SGPT) 20 06/19/2021 06:04 AM     Lab Results   Component Value Date/Time    WBC 6.2 07/27/2021 01:51 PM    WBC 5.7 07/02/2021 09:53 AM    HGB 11.4 (L) 07/27/2021 01:51 PM    HGB 12.1 (L) 07/02/2021 09:53 AM    HCT 34.6 (L) 07/27/2021 01:51 PM    HCT 36.9 (L) 07/02/2021 09:53 AM    PLATELET 608 16/42/2029 01:51 PM    PLATELET 366 51/95/1456 09:53 AM     Lab Results   Component Value Date/Time    aPTT 34.6 06/19/2021 04:08 PM    Prothrombin time 14.7 06/19/2021 04:08 PM    INR 1.1 06/19/2021 04:08 PM       Assessment:     left ureteral stones    Hospital Problems  Date Reviewed: 7/12/2021    None          Plan:     Planned Procedure:  Nephrostogram, convert to nephroureteral drain. Risks, benefits, and alternatives reviewed with patient and he agrees to proceed with the procedure.       Signed By: Tj Gonzalez PA-C     August 3, 2021

## 2021-08-03 NOTE — PROGRESS NOTES
TRANSFER - IN REPORT:    Verbal report received from Ciera Quiñonez on [facility]  being received from PACU for routine progression of care      Report consisted of patients Situation, Background, Assessment and   Recommendations(SBAR). Information from the following report(s) SBAR, Procedure Summary, Intake/Output and MAR was reviewed with the receiving nurse. Opportunity for questions and clarification was provided. Assessment completed upon patients arrival to unit and care assumed.

## 2021-08-03 NOTE — PROCEDURES
Department of Interventional Radiology  [phone]        Interventional Radiology Brief Procedure Note    Patient: Rosa Vasquez MRN: [id number]  SSN: [id number]    YOB: 1940  Age: 80 y.o. Sex: male      Date of Procedure: 8/3/2021    Pre-Procedure Diagnosis: Distal left ureter stones. Hydronephrosis. Post-Procedure Diagnosis: SAME    Procedure(s): Percutaneous Nephrostomy conversion to Nephroureteral sheath/catheter. Brief Description of Procedure: as above    Performed By: Hector Godinez MD     Assistants: None    Anesthesia:Moderate Sedation    Estimated Blood Loss: None    Specimens:  None    Implants:  As above    Findings:  One large stone in the distal left ureter. Complications: None    Recommendations: Trasfer to Pre-Op.       Follow Up: Dr Jasmina Fernandes    Signed By: Hector Godinez MD     August 3, 2021

## 2021-08-03 NOTE — PROGRESS NOTES
TRANSFER - OUT REPORT:    Verbal report given to Calli Harman on Aniya Escobar  being transferred to Pre-op for routine progression of care       Report consisted of patients Situation, Background, Assessment and   Recommendations(SBAR). Information from the following report(s) SBAR, Procedure Summary, Intake/Output and MAR was reviewed with the receiving nurse. Opportunity for questions and clarification was provided. Conscious Sedation:   100 Mcg of Fentanyl administered  2 Mg of Versed administered    Pt tolerated procedure well.      Visit Vitals  BP (!) 143/86   Pulse 76   Temp 98.3 °F (36.8 °C)   Resp 16   SpO2 100%     Past Medical History:   Diagnosis Date    Acute encephalopathy 6/18/2021    Arthritis     OA- bilat knees    Bladder absent     stoma present    Bladder cancer (HCC)     CAD (coronary artery disease)     CABG x 4 in 2004 - no damage- emergenc CABG- Cath 1/2011- total occlusion ramus, total mid occlusion left anterior descending, 50-70% stenosis of mid right coronary artery- \"Normal LEF\"    Cancer ([facility] [address] 1/1/2008    bladder- chemo in bladder then 7/11 bladder removed    CRI (chronic renal insufficiency), stage 3 (moderate) (HCC)     Depression     Family history of diabetes mellitus     Heart murmur     mild mitral regurgitation    Hypercholesteremia     Hypertension     Infection     to s/p hernia wound 6/6699    Other complication of colostomy or enterostomy     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    Followed by surgeon Dr Aaron Morataya Unspecified intestinal obstruction     Urinary tract infection 6/18/2021    Ventral hernia, unspecified, without mention of obstruction or gangrene      Peripheral IV 08/03/21 Left;Posterior Hand (Active)   Site Assessment Clean, dry, & intact 08/03/21 0843   Phlebitis Assessment 0 08/03/21 0843   Infiltration Assessment 0 08/03/21 0843   Dressing Status Clean, dry, & intact 08/03/21 0843   Dressing Type Tape;Transparent 08/03/21 [address] [facility] [employer] [facility] Color/Line Status Pink 08/03/21 0843                 Nephrostomy Tube 06/21/21 (Active)

## 2021-08-03 NOTE — PERIOP NOTES
TRANSFER - OUT REPORT:    Verbal report given to Philip N Sheridan  on [facility]  being transferred to [phone] for routine post - op       Report consisted of patients Situation, Background, Assessment and   Recommendations(SBAR). Information from the following report(s) SBAR, OR Summary, Procedure Summary, Intake/Output and MAR was reviewed with the receiving nurse. Lines:   Peripheral IV 08/03/21 Left;Posterior Hand (Active)   Site Assessment Clean, dry, & intact 08/03/21 1632   Phlebitis Assessment 0 08/03/21 1632   Infiltration Assessment 0 08/03/21 1632   Dressing Status Clean, dry, & intact 08/03/21 1632   Dressing Type Transparent;Tape 08/03/21 1632   Hub Color/Line Status Patent 08/03/21 1632   Alcohol Cap Used No 08/03/21 1632        Opportunity for questions and clarification was provided. Patient transported with:   O2 @ 2 liters    VTE prophylaxis orders have been written for [facility].    Patient and family given floor number and nurses name. Family updated re: pt status after security code verified.

## 2021-08-03 NOTE — PROGRESS NOTES
To room 637 Alert and oriented x 4. Resp even and unlabored on 2l NC. L flank drsg clean, dry, and intact with nephrostomy tube patent with bloody drainage noted. Urostomy bag intact with yellow urine noted to L side of abd. All skin intact dry to feet with no break down noted. Voices no complaints.  Dual skin assessment completed with [facility],

## 2021-08-03 NOTE — BRIEF OP NOTE
Brief Postoperative Note    Patient: Dorisann Schaumann  YOB: 1940  MRN: [id number]    Date of Procedure: 8/3/2021     Pre-Op Diagnosis: Kidney stone [N20.0]    Post-Op Diagnosis: Same as preoperative diagnosis.       Procedure(s):  left antegrade ureteropyeloscopy    Surgeon(s):  Krystina Brown MD    Surgical Assistant: None    Anesthesia: General     Estimated Blood Loss (mL): Minimal    Complications: None    Specimens: * No specimens in log *     Implants: * No implants in log *    Drains:   Nephrostomy Tube 06/21/21 (Active)       Nephrostomy Tube 08/03/21 Flank (Active)       [REMOVED] Nephrostomy Tube 06/21/21 Flank (Removed)       Findings: see op note    Electronically Signed by Beryle Buys, MD on 8/3/2021 at 4:01 PM

## 2021-08-03 NOTE — PROGRESS NOTES
's follow-up visit in Pre-op after his return to the unit. Mr. Fanny Torres received prayer prior to surgery.       Alex Greer 70  Board Certified

## 2021-08-03 NOTE — PROGRESS NOTES
TRANSFER - OUT REPORT:    Verbal report given to Jimmy Contreras on Tete Counts  being transferred to IR Recovery for routine post - op       Report consisted of patients Situation, Background, Assessment and   Recommendations(SBAR). Information from the following report(s) SBAR, Procedure Summary, Intake/Output and MAR was reviewed with the receiving nurse. Opportunity for questions and clarification was provided. Conscious Sedation:   100 Mcg of Fentanyl administered  2 Mg of Versed administered    Pt tolerated procedure well.      Visit Vitals  BP (!) 143/86   Pulse 76   Temp 98.3 °F (36.8 °C)   Resp 16   SpO2 100%     Past Medical History:   Diagnosis Date    Acute encephalopathy 6/18/2021    Arthritis     OA- bilat knees    Bladder absent     stoma present    Bladder cancer (HCC)     CAD (coronary artery disease)     CABG x 4 in 2004 - no damage- emergenc CABG- Cath 1/2011- total occlusion ramus, total mid occlusion left anterior descending, 50-70% stenosis of mid right coronary artery- \"Normal LEF\"    Cancer ([facility] [address] 1/1/2008    bladder- chemo in bladder then 7/11 bladder removed    CRI (chronic renal insufficiency), stage 3 (moderate) (HCC)     Depression     Family history of diabetes mellitus     Heart murmur     mild mitral regurgitation    Hypercholesteremia     Hypertension     Infection     to s/p hernia wound 9/5111    Other complication of colostomy or enterostomy     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

## 2021-08-03 NOTE — PROGRESS NOTES
Pt to IR Suite 1 via stretcher.   IR Nurse Pre-Procedure Checklist Part 2          Consent signed: Yes    H&P complete:  Yes    Antibiotics: Yes    Airway/Mallampati Done: Yes    Shaved: Not applicable    Pregnancy Form:Not applicable    Patient Position: Yes    MD Side: Yes     Biopsy Worksheet: Not applicable    Specimen Medium: Not applicable

## 2021-08-03 NOTE — H&P
Calli Dai  : 1940         Chief Complaint   Patient presents with    Post OP Follow Up         HPI      Calli Dai is a 68 y.o. male Patient is status post radical cystoprostatectomy with ileal conduit on 2008.  Preoperatively he had had stage [id number] cancer which had persisted after intravesical therapy.  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      Patient did extremely well postoperatively but developed small bowel  obstruction several weeks postop. [facility] was treated with TPN at home. Had repair of parastomal hernia, bowel obstruction in . Had infection of the mesh, which was removed. Had repair of recurrent parastomal hernia in 3-13.     He feels that his parastomal hernia has recurred. He does not want it treated. Was seen on 2-15-17 with c/o gross hematuria in his ileostomy bag . Vincent Baker Had CT A/P w/wo on 17: IMPRESSION:  1. Post-operative changes of cystectomy and right lower quadrant ostomy. There  is a large parastomal hernia containing transverse colon without bowel  obstruction.      2. Lower pole left renal calculus.      3. Cholelithiasis. . Urine cytology negative. Applied silver nitrate to stoma 3-2-17. I told him to take Vit c and appl vinegar to the stoma area. He has been doing well, no hematuria.       Admitted 21 with urosepsis. CT urogram 21:    IMPRESSION  Moderately severe left hydroureteronephrosis with multiple, (at  least 5) small, 2-5 mm stones in tandem in the lower left ureter. Resting in the  left, status post cystectomy with a parastomal hernia. Had left nephrostomy tube placement 21.  He returns for follow up.he has had no fevers.           Past Medical History:   Diagnosis Date    Acute encephalopathy 2021    Arthritis       OA- bilat knees    Bladder absent       stoma present    Bladder cancer (HCC)      CAD (coronary artery disease)       CABG x 4 in  - no damage- emergenc CABG- Cath 1/2011- total occlusion ramus, total mid occlusion left anterior descending, 50-70% stenosis of mid right coronary artery- \"Normal LEF\"    Cancer ([facility] [address] 1/1/2008     bladder- chemo in bladder then 7/11 bladder removed    CRI (chronic renal insufficiency), stage 3 (moderate) (HCC)      Depression      Family history of diabetes mellitus      Heart murmur       mild mitral regurgitation    Hypercholesteremia      Hypertension      Infection       to s/p hernia wound 5/0321    Other complication of colostomy or enterostomy      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     Followed by surgeon Dr Evelin Torres Unspecified intestinal obstruction      Urinary tract infection 6/18/2021    Ventral hernia, unspecified, without mention of obstruction or gangrene              Past Surgical History:   Procedure Laterality Date    HX COLONOSCOPY   1/07     repeat 10 years    HX HEART CATHETERIZATION   2011    HX HERNIA REPAIR   8/26/11     hernia repair    HX HERNIA REPAIR   9/2011     9 days later emergency s/p hernia repair surg    HX VASECTOMY   1974    IR NEPHROSTOMY PERC LT PLC CATH  SI   6/21/2021    IL CARDIAC SURG PROCEDURE UNLIST   2004     CABG x 4    IL REMV BLADDER,TOTAL   7/2008     ostomy             Current Outpatient Medications   Medication Sig Dispense Refill    acetaminophen (TYLENOL) 500 mg tablet Take 500 mg by mouth every six (6) hours as needed for Pain. Take 1-2 tablets every 6 hrs as needed        cefdinir (OMNICEF) 300 mg capsule Take 1 Capsule by mouth two (2) times a day. (Patient not taking: Reported on 7/2/2021) 20 Capsule 0    losartan (COZAAR) 100 mg tablet Take 1 Tab by mouth daily. 90 Tab 3    FLUoxetine (PROzac) 20 mg capsule Take 1 Cap by mouth daily. 90 Cap 1    simvastatin (ZOCOR) 80 mg tablet TAKE 1 TABLET AT BEDTIME (Patient taking differently: Take 80 mg by mouth nightly.  TAKE 1 TABLET AT BEDTIME) 90 Tab 3    aspirin delayed-release 81 mg tablet Take 1 Tab by mouth daily. Indications: MYOCARDIAL INFARCTION PREVENTION 1 Tab 1      No Known Allergies  Social History            Socioeconomic History    Marital status:        Spouse name: Not on file    Number of children: Not on file    Years of education: Not on file    Highest education level: Not on file   Occupational History    Not on file   Tobacco Use    Smoking status: Never Smoker    Smokeless tobacco: Never Used   Substance and Sexual Activity    Alcohol use: No    Drug use: Never    Sexual activity: Not Currently   Other Topics Concern    Not on file   Social History Narrative     Wife  2016, two sons      Social Determinants of Health          Financial Resource Strain:     Difficulty of Paying Living Expenses:    Food Insecurity:     Worried About Running Out of Food in the Last Year:     [address] in the Last Year:    Transportation Needs:     Lack of Transportation (Medical):  Lack of Transportation (Non-Medical):    Physical Activity:     Days of Exercise per Week:     Minutes of Exercise per Session:    Stress:     Feeling of Stress :    Social Connections:     Frequency of Communication with Friends and Family:     Frequency of Social Gatherings with Friends and Family:     Attends [employer] Services:     Active Member of Clubs or Organizations:     Attends Club or Organization Meetings:     Marital Status:    Intimate Partner Violence:     Fear of Current or Ex-Partner:     Emotionally Abused:     Physically Abused:     Sexually Abused:             Family History   Problem Relation Age of Onset    Heart Disease Father 37         MI    Hypertension Father      Heart Disease Sister      Arthritis-osteo Sister      Cancer Mother           OVARY    Diabetes Sister      Hypertension Sister           Review of Systems  Constitutional:   Negative for fever. GI:  Negative for nausea.   Genitourinary:  Negative for urinary burning.        There were no vitals taken for this visit. Physical Exam  General   Mental Status - Patient is alert and oriented X3. Build & Nutrition - Well nourished.        Chest and Lung Exam   Chest and lung exam reveals  - normal excursion with symmetric chest walls, quiet, even and easy respiratory effort with no use of accessory muscles and on auscultation, normal breath sounds, no adventitious sounds and normal vocal resonance.        Cardiovascular   Cardiovascular examination reveals  - normal heart sounds, regular rate and rhythm with no murmurs.        Abdomen   Palpation/Percussion: Palpation and Percussion of the abdomen reveal - Non Tender, No Rebound tenderness, No Rigidity (guarding), No hepatosplenomegaly, No Palpable abdominal masses and Soft. Hernia - Bilateral - No Hernia(s) present.     Urinalysis  UA - Dipstick  No results found for this or any previous visit.     UA - Micro  WBC - 0  RBC - 0  Bacteria - 0  Epith - 0     Physical Exam     Assessment and Plan      ICD-10-CM ICD-9-CM     1. Malignant neoplasm of urinary bladder, unspecified site (HCC)  C67.9 188. 9     2. Left ureteral stone  N20.1 592. 1        Recommend left percutaneous nephrostolithotomy. Risks os bleeding, infection, discussed. Will use flexible ureteroscope.

## 2021-08-03 NOTE — ANESTHESIA POSTPROCEDURE EVALUATION
Procedure(s):  left antegrade ureteropyeloscopy. general    Anesthesia Post Evaluation      Multimodal analgesia: multimodal analgesia used between 6 hours prior to anesthesia start to PACU discharge  Patient location during evaluation: PACU  Patient participation: complete - patient participated  Level of consciousness: awake  Pain management: adequate  Airway patency: patent  Anesthetic complications: no  Cardiovascular status: acceptable and hemodynamically stable  Respiratory status: acceptable  Hydration status: acceptable  Comments: Acceptable for discharge from PACU. Post anesthesia nausea and vomiting:  none  Final Post Anesthesia Temperature Assessment:  Normothermia (36.0-37.5 degrees C)      INITIAL Post-op Vital signs:   Vitals Value Taken Time   /62 08/03/21 1626   Temp 36.2 °C (97.2 °F) 08/03/21 1606   Pulse 66 08/03/21 1630   Resp 14 08/03/21 1611   SpO2 100 % 08/03/21 1630   Vitals shown include unvalidated device data.

## 2021-08-04 PROCEDURE — 99024 POSTOP FOLLOW-UP VISIT: CPT | Performed by: NURSE PRACTITIONER

## 2021-08-04 PROCEDURE — 74011250637 HC RX REV CODE- 250/637: Performed by: UROLOGY

## 2021-08-04 PROCEDURE — 99218 HC RM OBSERVATION: CPT

## 2021-08-04 PROCEDURE — 3331090001 HH PPS REVENUE CREDIT

## 2021-08-04 PROCEDURE — 3331090002 HH PPS REVENUE DEBIT

## 2021-08-04 PROCEDURE — 74011000250 HC RX REV CODE- 250: Performed by: UROLOGY

## 2021-08-04 RX ADMIN — SULFAMETHOXAZOLE AND TRIMETHOPRIM 1 TABLET: 800; 160 TABLET ORAL at 08:13

## 2021-08-04 RX ADMIN — Medication 10 ML: at 22:00

## 2021-08-04 RX ADMIN — Medication 10 ML: at 05:00

## 2021-08-04 RX ADMIN — DEXTROSE MONOHYDRATE AND SODIUM CHLORIDE 75 ML/HR: 5; .45 INJECTION, SOLUTION INTRAVENOUS at 07:00

## 2021-08-04 RX ADMIN — DEXTROSE MONOHYDRATE AND SODIUM CHLORIDE 75 ML/HR: 5; .45 INJECTION, SOLUTION INTRAVENOUS at 18:01

## 2021-08-04 RX ADMIN — FLUOXETINE 20 MG: 20 CAPSULE ORAL at 08:14

## 2021-08-04 RX ADMIN — LOSARTAN POTASSIUM 100 MG: 50 TABLET, FILM COATED ORAL at 08:13

## 2021-08-04 RX ADMIN — SULFAMETHOXAZOLE AND TRIMETHOPRIM 1 TABLET: 800; 160 TABLET ORAL at 18:03

## 2021-08-04 RX ADMIN — ASPIRIN 81 MG: 81 TABLET ORAL at 08:13

## 2021-08-04 RX ADMIN — Medication 10 ML: at 14:00

## 2021-08-04 NOTE — OP NOTES
[address] [facility]  OPERATIVE REPORT    Name:  Tilda Blizzard  MR#:  [id number]  :  1940  ACCOUNT #:  [de-identified]  DATE OF SERVICE:  2021    PREOPERATIVE DIAGNOSIS:  Left ureteral calculus. POSTOPERATIVE DIAGNOSIS:  No stone seen. PROCEDURE PERFORMED:  Left antegrade ureteropyeloscopy, left antegrade pyelogram with interpretation of pyelogram.    SURGEON:  Susannah Chi MD    ASSISTANT:  None. ANESTHESIA:  General.    COMPLICATIONS:  None. SPECIMENS REMOVED:  None. IMPLANTS:  None. ESTIMATED BLOOD LOSS:  Minimal.    FINDINGS:  No evidence of ureteral stones seen during flexible endoscopy of the left collecting system. PROCEDURE:  The patient had been to Interventional Radiology where a percutaneous nephrostomy was converted to a ureteral catheter. He was given a general anesthetic, placed in the prone position. The patient has had a previous cystectomy and ileal conduit. Examination was performed. We used C-arm for fluoroscopy during the procedure. The patient has a coaxial ureteral catheter coming from the left flank. We used an Amplatz guide wire initially through the inner catheter and using fluoroscopy, passed it down into the area of the ileal conduit. We then passed another wire alongside that one and the outer sheath. However, this wire seemed to curl up in the superior portion of the renal pelvis. I then passed a coaxial catheter over the Amplatz wire and repositioned the second 0.038 floppy guidewire through that catheter, so that both wires were going [id number] down the ureter and into the ileal conduit. We then incised the skin to accommodate a NephroMax dilating balloon. This was passed into the area of the left renal pelvis. There was still some contrast in the pelvis of the kidney. We dilated to 12 atmospheres.   Initially, there was a waist in the subcutaneous area but I was able to spread the obstructing area with hemostats after deflating the balloon. The balloon was reinflated and I passed the sheath over the balloon into the renal pelvis. Balloon was deflated and the catheter was removed. I then passed a rigid nephroscope through the sheath. I was able to visualize the inside of the renal pelvis and both wires going down the ureter. We then passed a LithoVue digital ureteroscope. I was able to pass it down the ureter. Both wires were followed. This was passed down to the anastomosis of the ureter to the ileal conduit. I did not see any stones in the ureter. We passed the scope twice. At one point, I passed the scope over the Amplatz guidewire. I was able to pass the scope into the ileal conduit. The inside of the conduit was visualized and seemed to be normal.    The scope was withdrawn leaving both wires in place. At this point, we made a decision to place a nephrostomy tube. A 22-Telugu Tangirnaq catheter was passed over the Amplatz wire and positioned in the area of the renal pelvis. We performed a left antegrade pyelogram.    Interpretation of pyelogram:  Contrast was injected through the Tangirnaq catheter. The tip of the catheter was seen to reside in the renal pelvis. We inflated the balloon of the catheter with 2 mL of water. There was no evidence of any extravasation. At this point, we cut the sheath off of the catheter leaving the catheter in place and sewed the catheter into the skin with a nylon suture. I removed both guidewires as well, leaving the nephrostomy in place. PLAN:  To keep the patient for observation. At some point, we will plug the nephrostomy and if he has no symptoms, we will have the nephrostomy removed.       MD SELENA [id number]  D:  08/03/2021 16:09  T:  08/03/2021 22:51  JOB #:  [id number]

## 2021-08-04 NOTE — PROGRESS NOTES
Admit Date: 8/3/2021    Subjective:     Sabrina Smith is sitting up, he has perc tube in place with blood tinged urine that is clearing. Urostomy with clear yellow urine OP. VSS. Objective:     Patient Vitals for the past 8 hrs:   BP Temp Pulse Resp SpO2 Weight   08/04/21 0717 126/78 98.1 °F (36.7 °C) 72 18 98 % --   08/04/21 0551 113/68 98.6 °F (37 °C) 63 18 99 % 180 lb 1.9 oz (81.7 kg)     08/04 0701 - 08/04 1900  In: 240 [P.O.:240]  Out: -   08/02 1901 - 08/04 0700  In: 2013 [I.V.:2013]  Out: 2686 [Urine:1150]    Physical Exam:  GENERAL: alert, cooperative, no distress  LUNG: clear to auscultation bilaterally  HEART: regular rate and rhythm, S1, S2   ABDOMEN: soft, non-tender, urostomy with clear yellow urine  NEUROLOGIC: AOx3  : urostomy/NT      Data Review No results found for this or any previous visit (from the past 24 hour(s)). Assessment:     Active Problems:    Ureteral stone (8/3/2021)      POD 1:    PREOPERATIVE DIAGNOSIS:  Left ureteral calculus. POSTOPERATIVE DIAGNOSIS:  No stone seen. PROCEDURE PERFORMED:  Left antegrade ureteropyeloscopy, left antegrade pyelogram with interpretation of pyelogram.    Afebrile, VSS    Plan:     NT capped. Monitor for severe pain, leakage or fevers. If this occurs, reconnect NT to drainage bag. Ambulate  Pt. Saline lock IV. Continue PO bactrim. Plan is to remove NT tomorrow AM and d/c after if pt tolerates well. Marika Ramirez NP  [facility]  I have reviewed the above note and examined the patient. I agree with the exam, assessment and plan.     Tristan Bryant MD

## 2021-08-04 NOTE — PROGRESS NOTES
Problem: Falls - Risk of  Goal: *Absence of Falls  Description: Document [place] Fall Risk and appropriate interventions in the flowsheet.   Outcome: Progressing Towards Goal  Note: Fall Risk Interventions:

## 2021-08-05 ENCOUNTER — APPOINTMENT (OUTPATIENT)
Dept: CT IMAGING | Age: 81
DRG: 694 | End: 2021-08-05
Attending: NURSE PRACTITIONER
Payer: MEDICARE

## 2021-08-05 LAB
ANION GAP SERPL CALC-SCNC: 5 MMOL/L (ref 7–16)
ANION GAP SERPL CALC-SCNC: 5 MMOL/L (ref 7–16)
BUN SERPL-MCNC: 13 MG/DL (ref 8–23)
BUN SERPL-MCNC: 15 MG/DL (ref 8–23)
CALCIUM SERPL-MCNC: 7.2 MG/DL (ref 8.3–10.4)
CALCIUM SERPL-MCNC: 9.2 MG/DL (ref 8.3–10.4)
CHLORIDE SERPL-SCNC: 101 MMOL/L (ref 98–107)
CHLORIDE SERPL-SCNC: 107 MMOL/L (ref 98–107)
CO2 SERPL-SCNC: 23 MMOL/L (ref 21–32)
CO2 SERPL-SCNC: 27 MMOL/L (ref 21–32)
CREAT SERPL-MCNC: 1.33 MG/DL (ref 0.8–1.5)
CREAT SERPL-MCNC: 1.35 MG/DL (ref 0.8–1.5)
ERYTHROCYTE [DISTWIDTH] IN BLOOD BY AUTOMATED COUNT: 14.1 % (ref 11.9–14.6)
GLUCOSE BLD STRIP.AUTO-MCNC: 110 MG/DL (ref 65–100)
GLUCOSE SERPL-MCNC: 108 MG/DL (ref 65–100)
GLUCOSE SERPL-MCNC: 911 MG/DL (ref 65–100)
HCT VFR BLD AUTO: 32.6 % (ref 41.1–50.3)
HGB BLD-MCNC: 10.3 G/DL (ref 13.6–17.2)
MCH RBC QN AUTO: 31.1 PG (ref 26.1–32.9)
MCHC RBC AUTO-ENTMCNC: 31.6 G/DL (ref 31.4–35)
MCV RBC AUTO: 98.5 FL (ref 79.6–97.8)
NRBC # BLD: 0 K/UL (ref 0–0.2)
PLATELET # BLD AUTO: 119 K/UL (ref 150–450)
PMV BLD AUTO: 10.1 FL (ref 9.4–12.3)
POTASSIUM SERPL-SCNC: 3.4 MMOL/L (ref 3.5–5.1)
POTASSIUM SERPL-SCNC: 4.3 MMOL/L (ref 3.5–5.1)
RBC # BLD AUTO: 3.31 M/UL (ref 4.23–5.6)
SERVICE CMNT-IMP: ABNORMAL
SODIUM SERPL-SCNC: 129 MMOL/L (ref 138–145)
SODIUM SERPL-SCNC: 139 MMOL/L (ref 136–145)
WBC # BLD AUTO: 6.2 K/UL (ref 4.3–11.1)

## 2021-08-05 PROCEDURE — 99024 POSTOP FOLLOW-UP VISIT: CPT | Performed by: NURSE PRACTITIONER

## 2021-08-05 PROCEDURE — 74011250637 HC RX REV CODE- 250/637: Performed by: NURSE PRACTITIONER

## 2021-08-05 PROCEDURE — 80048 BASIC METABOLIC PNL TOTAL CA: CPT

## 2021-08-05 PROCEDURE — 3331090001 HH PPS REVENUE CREDIT

## 2021-08-05 PROCEDURE — 74176 CT ABD & PELVIS W/O CONTRAST: CPT

## 2021-08-05 PROCEDURE — 82962 GLUCOSE BLOOD TEST: CPT

## 2021-08-05 PROCEDURE — 85027 COMPLETE CBC AUTOMATED: CPT

## 2021-08-05 PROCEDURE — 36415 COLL VENOUS BLD VENIPUNCTURE: CPT

## 2021-08-05 PROCEDURE — 99218 HC RM OBSERVATION: CPT

## 2021-08-05 PROCEDURE — 74011250636 HC RX REV CODE- 250/636: Performed by: NURSE PRACTITIONER

## 2021-08-05 PROCEDURE — 3331090002 HH PPS REVENUE DEBIT

## 2021-08-05 PROCEDURE — 74011250637 HC RX REV CODE- 250/637: Performed by: UROLOGY

## 2021-08-05 RX ORDER — FACIAL-BODY WIPES
10 EACH TOPICAL ONCE
Status: COMPLETED | OUTPATIENT
Start: 2021-08-05 | End: 2021-08-05

## 2021-08-05 RX ORDER — SODIUM CHLORIDE 9 MG/ML
100 INJECTION, SOLUTION INTRAVENOUS CONTINUOUS
Status: DISCONTINUED | OUTPATIENT
Start: 2021-08-05 | End: 2021-08-07 | Stop reason: HOSPADM

## 2021-08-05 RX ADMIN — FLUOXETINE 20 MG: 20 CAPSULE ORAL at 09:44

## 2021-08-05 RX ADMIN — Medication 10 ML: at 22:00

## 2021-08-05 RX ADMIN — Medication 10 ML: at 15:26

## 2021-08-05 RX ADMIN — Medication 10 ML: at 05:40

## 2021-08-05 RX ADMIN — LOSARTAN POTASSIUM 100 MG: 50 TABLET, FILM COATED ORAL at 09:44

## 2021-08-05 RX ADMIN — ASPIRIN 81 MG: 81 TABLET ORAL at 09:44

## 2021-08-05 RX ADMIN — SULFAMETHOXAZOLE AND TRIMETHOPRIM 1 TABLET: 800; 160 TABLET ORAL at 09:44

## 2021-08-05 RX ADMIN — BISACODYL 10 MG: 10 SUPPOSITORY RECTAL at 15:25

## 2021-08-05 RX ADMIN — SODIUM CHLORIDE 100 ML/HR: 900 INJECTION, SOLUTION INTRAVENOUS at 15:25

## 2021-08-05 RX ADMIN — SULFAMETHOXAZOLE AND TRIMETHOPRIM 1 TABLET: 800; 160 TABLET ORAL at 17:46

## 2021-08-05 NOTE — PROGRESS NOTES
Hourly rounds done. Pt denies pain, nausea, vomiting, afebrile. Urostomy output yellow/straw, clear. Min-mod drainage via NT. All needs met at this time.

## 2021-08-05 NOTE — PROGRESS NOTES
Follow-up CT shows:  IMPRESSION     1. Per Interval placement of left-sided percutaneous nephrostomy catheter with  diminished distention of the left renal pelvis. 2. Cystectomy with ileal conduit in the right lower quadrant. 3. Small stones present in the distal left ureter. 4. Cholelithiasis. 5. Mildly distended loops small bowel. This may be an ileus or an early distal  Obstruction. Plan:  Give dulcolax suppository. Recheck BMP. Start NS @100/hr. Ambulate pt. Reconnect  NT to drainage bag. Will observe overnight. NPO at MN in the event he needs stent placement. Will determine on rounds tomorrow. Reviewed CT: small stones in left distal ureter, will open nephrostomy. Left hydronephrosis is better. I have reviewed the above note and examined the patient. I agree with the exam, assessment and plan.     Radha Andrade MD

## 2021-08-05 NOTE — PROGRESS NOTES
Admit Date: 8/3/2021    Subjective:     Sasha Arora is reporting nausea/intermittent left flank pain. NT in place. VSS. Objective:     Patient Vitals for the past 8 hrs:   BP Temp Pulse Resp SpO2   08/05/21 0651 (!) 147/76 98.4 °F (36.9 °C) 68 18 95 %   08/05/21 0404 134/74 98.7 °F (37.1 °C) 68 18 98 %     No intake/output data recorded. 08/03 1901 - 08/05 0700  In: 2931 [P.O.:480; I.V.:2451]  Out: 7412 [Urine:1550]    Physical Exam:  GENERAL: alert, cooperative, no distress  LUNG: clear to auscultation bilaterally  HEART: regular rate and rhythm, S1, S2   ABDOMEN: soft, non-tender  NEUROLOGIC: AOx3  : NT in place      Data Review No results found for this or any previous visit (from the past 24 hour(s)). Assessment:     Active Problems:    Ureteral stone (8/3/2021)      POD 2:     PREOPERATIVE DIAGNOSIS:  Left ureteral calculus. POSTOPERATIVE DIAGNOSIS:  No stone seen. PROCEDURE PERFORMED:  Left antegrade ureteropyeloscopy, left antegrade pyelogram with interpretation of pyelogram.     Afebrile, VSS    Plan:     Obtain stat CT urogram to assess for obs stones. Check labs. If CT clear and labs stable, will remove NT today and he will d/c home. Monica Goyal NP  [facility]  He had mild LLQ discomfort since left nephrostomy was plugged. Will get CT urogram.  I have reviewed the above note and examined the patient. I agree with the exam, assessment and plan.     Darcie Arriola MD

## 2021-08-05 NOTE — PROGRESS NOTES
Problem: Falls - Risk of  Goal: *Absence of Falls  Description: Document [place] Fall Risk and appropriate interventions in the flowsheet.   Outcome: Progressing Towards Goal  Note: Fall Risk Interventions:  Mobility Interventions: Patient to call before getting OOB         Medication Interventions: Evaluate medications/consider consulting pharmacy         History of Falls Interventions: Door open when patient unattended

## 2021-08-05 NOTE — PROGRESS NOTES
RN reports glucose is 911 per BMP report. Pt was alert, oriented this AM and had c/o nausea. No reported hx of DM2. A1C is 6.0 in May 2021. Currently has D5/12NS ordered. Pt currently down for CT. Instructed RN to please check glucose with glucometer asap. D5 1/2 IVF stopped. Will await results and consult hospitalist if truly elevated. Fingerstick glucose is 110. Pt is stable. Possibly D5 1/2IVF infusing in during lab draw?

## 2021-08-05 NOTE — PROGRESS NOTES
CM met with patient to discuss d/c plan and needs. Patient alert/orient x4. Patient verified demographic no changes needed. Verified PCP and insurance. Patient states he use [employer] to purchase medication. States he has no issue with purchasing or affording medication. Patient states he lives alone in a one level home with having access to both ramp and two steps to enter into the home. States he's independent with his ADL's and has no DME's he use's at this time. Patient states he's currently active with [facility]. Patient [facility] will be resumed upon d/c home. CM will continue to monitor and follow for any needs that may occur. Care Management Interventions  PCP Verified by CM: Yes (Tamara Spaulding MD)  Mode of Transport at Discharge:  Other (see comment)  Transition of Care Consult (CM Consult): Discharge Planning, Home Health (Patient active with [facility])  [facility] - INPATIENT: Yes  Discharge Durable Medical Equipment: No  Physical Therapy Consult: No  Occupational Therapy Consult: No  Speech Therapy Consult: No  Current Support Network: Own Home, Lives Alone  Confirm Follow Up Transport: Family  The Patient and/or Patient Representative was Provided with a Choice of Provider and Agrees with the Discharge Plan?: No  Freedom of Choice List was Provided with Basic Dialogue that Supports the Patient's Individualized Plan of Care/Goals, Treatment Preferences and Shares the Quality Data Associated with the Providers?: No  [place] Resource Information Provided?: No  Discharge Location  Discharge Placement: Home with home health (patient is active with [facility] will be resumed upon d/c home)

## 2021-08-06 ENCOUNTER — HOME CARE VISIT (OUTPATIENT)
Dept: SCHEDULING | Facility: HOME HEALTH | Age: 81
End: 2021-08-06
Payer: MEDICARE

## 2021-08-06 ENCOUNTER — HOSPITAL ENCOUNTER (INPATIENT)
Dept: INTERVENTIONAL RADIOLOGY/VASCULAR | Age: 81
Discharge: HOME | DRG: 694 | End: 2021-08-06
Attending: UROLOGY | Admitting: UROLOGY
Payer: MEDICARE

## 2021-08-06 VITALS
SYSTOLIC BLOOD PRESSURE: 185 MMHG | TEMPERATURE: 97.6 F | RESPIRATION RATE: 16 BRPM | HEART RATE: 75 BPM | DIASTOLIC BLOOD PRESSURE: 84 MMHG | OXYGEN SATURATION: 94 %

## 2021-08-06 PROCEDURE — 74011000636 HC RX REV CODE- 636: Performed by: RADIOLOGY

## 2021-08-06 PROCEDURE — 77030002916 HC SUT ETHLN J&J -A

## 2021-08-06 PROCEDURE — 3331090002 HH PPS REVENUE DEBIT

## 2021-08-06 PROCEDURE — 74011250637 HC RX REV CODE- 250/637: Performed by: UROLOGY

## 2021-08-06 PROCEDURE — C1769 GUIDE WIRE: HCPCS

## 2021-08-06 PROCEDURE — BT121ZZ FLUOROSCOPY OF LEFT KIDNEY USING LOW OSMOLAR CONTRAST: ICD-10-PCS | Performed by: RADIOLOGY

## 2021-08-06 PROCEDURE — 65270000029 HC RM PRIVATE

## 2021-08-06 PROCEDURE — 3331090001 HH PPS REVENUE CREDIT

## 2021-08-06 PROCEDURE — 99218 HC RM OBSERVATION: CPT

## 2021-08-06 PROCEDURE — 99024 POSTOP FOLLOW-UP VISIT: CPT | Performed by: NURSE PRACTITIONER

## 2021-08-06 PROCEDURE — 0TP5X0Z REMOVAL OF DRAINAGE DEVICE FROM KIDNEY, EXTERNAL APPROACH: ICD-10-PCS | Performed by: RADIOLOGY

## 2021-08-06 PROCEDURE — C2625 STENT, NON-COR, TEM W/DEL SY: HCPCS

## 2021-08-06 PROCEDURE — 0T9730Z DRAINAGE OF LEFT URETER WITH DRAINAGE DEVICE, PERCUTANEOUS APPROACH: ICD-10-PCS | Performed by: RADIOLOGY

## 2021-08-06 PROCEDURE — 77030025702 HC BG URIN DRN MRTM -A

## 2021-08-06 PROCEDURE — 74011000250 HC RX REV CODE- 250: Performed by: RADIOLOGY

## 2021-08-06 PROCEDURE — 74011250636 HC RX REV CODE- 250/636: Performed by: NURSE PRACTITIONER

## 2021-08-06 PROCEDURE — 77030021532 HC CATH ANGI DX IMPRS MRTM -B

## 2021-08-06 PROCEDURE — 74011250636 HC RX REV CODE- 250/636: Performed by: RADIOLOGY

## 2021-08-06 PROCEDURE — 99152 MOD SED SAME PHYS/QHP 5/>YRS: CPT

## 2021-08-06 PROCEDURE — BT171ZZ FLUOROSCOPY OF LEFT URETER USING LOW OSMOLAR CONTRAST: ICD-10-PCS | Performed by: RADIOLOGY

## 2021-08-06 RX ORDER — MIDAZOLAM HYDROCHLORIDE 1 MG/ML
.5-2 INJECTION, SOLUTION INTRAMUSCULAR; INTRAVENOUS
Status: DISCONTINUED | OUTPATIENT
Start: 2021-08-06 | End: 2021-08-06

## 2021-08-06 RX ORDER — FENTANYL CITRATE 50 UG/ML
25-100 INJECTION, SOLUTION INTRAMUSCULAR; INTRAVENOUS
Status: DISCONTINUED | OUTPATIENT
Start: 2021-08-06 | End: 2021-08-06

## 2021-08-06 RX ORDER — LIDOCAINE HYDROCHLORIDE 20 MG/ML
20-200 INJECTION, SOLUTION INFILTRATION; PERINEURAL
Status: DISCONTINUED | OUTPATIENT
Start: 2021-08-06 | End: 2021-08-06

## 2021-08-06 RX ORDER — SODIUM CHLORIDE 9 MG/ML
25 INJECTION, SOLUTION INTRAVENOUS ONCE
Status: COMPLETED | OUTPATIENT
Start: 2021-08-06 | End: 2021-08-06

## 2021-08-06 RX ADMIN — Medication 10 ML: at 17:20

## 2021-08-06 RX ADMIN — Medication 10 ML: at 05:54

## 2021-08-06 RX ADMIN — IOPAMIDOL 30 ML: 612 INJECTION, SOLUTION INTRAVENOUS at 15:56

## 2021-08-06 RX ADMIN — ASPIRIN 81 MG: 81 TABLET ORAL at 08:27

## 2021-08-06 RX ADMIN — FENTANYL CITRATE 50 MCG: 50 INJECTION, SOLUTION INTRAMUSCULAR; INTRAVENOUS at 15:43

## 2021-08-06 RX ADMIN — Medication 10 ML: at 22:00

## 2021-08-06 RX ADMIN — FLUOXETINE 20 MG: 20 CAPSULE ORAL at 08:27

## 2021-08-06 RX ADMIN — FENTANYL CITRATE 50 MCG: 50 INJECTION, SOLUTION INTRAMUSCULAR; INTRAVENOUS at 15:30

## 2021-08-06 RX ADMIN — SODIUM CHLORIDE 25 ML/HR: 900 INJECTION, SOLUTION INTRAVENOUS at 15:20

## 2021-08-06 RX ADMIN — SULFAMETHOXAZOLE AND TRIMETHOPRIM 1 TABLET: 800; 160 TABLET ORAL at 17:21

## 2021-08-06 RX ADMIN — SODIUM CHLORIDE 100 ML/HR: 900 INJECTION, SOLUTION INTRAVENOUS at 12:11

## 2021-08-06 RX ADMIN — MIDAZOLAM 1 MG: 1 INJECTION INTRAMUSCULAR; INTRAVENOUS at 15:30

## 2021-08-06 RX ADMIN — MIDAZOLAM 1 MG: 1 INJECTION INTRAMUSCULAR; INTRAVENOUS at 15:43

## 2021-08-06 RX ADMIN — SODIUM CHLORIDE 100 ML/HR: 900 INJECTION, SOLUTION INTRAVENOUS at 01:54

## 2021-08-06 RX ADMIN — LIDOCAINE HYDROCHLORIDE 200 MG: 20 INJECTION, SOLUTION INFILTRATION; PERINEURAL at 15:39

## 2021-08-06 RX ADMIN — SULFAMETHOXAZOLE AND TRIMETHOPRIM 1 TABLET: 800; 160 TABLET ORAL at 08:27

## 2021-08-06 RX ADMIN — LOSARTAN POTASSIUM 100 MG: 50 TABLET, FILM COATED ORAL at 08:27

## 2021-08-06 NOTE — PROGRESS NOTES
TRANSFER - OUT REPORT:    Verbal report given to Татьяна Robles on Cristhian Tran  being transferred to IR room 2 for routine post - op       Report consisted of patients Situation, Background, Assessment and   Recommendations(SBAR). Information from the following report(s) SBAR, Procedure Summary and MAR was reviewed with the receiving nurse. Opportunity for questions and clarification was provided. Conscious Sedation:   100 Mcg of Fentanyl administered  2 Mg of Versed administered    Pt tolerated procedure well.      Visit Vitals  BP (!) 185/84   Pulse 75   Temp 97.6 °F (36.4 °C)   Resp 16   SpO2 94%     Past Medical History:   Diagnosis Date    Acute encephalopathy 6/18/2021    Arthritis     OA- bilat knees    Bladder absent     stoma present    Bladder cancer (HCC)     CAD (coronary artery disease)     CABG x 4 in 2004 - no damage- emergenc CABG- Cath 1/2011- total occlusion ramus, total mid occlusion left anterior descending, 50-70% stenosis of mid right coronary artery- \"Normal LEF\"    Cancer ([facility] [address] 1/1/2008    bladder- chemo in bladder then 7/11 bladder removed    CRI (chronic renal insufficiency), stage 3 (moderate) (HCC)     Depression     Family history of diabetes mellitus     Heart murmur     mild mitral regurgitation    Hypercholesteremia     Hypertension     Infection     to s/p hernia wound 4/9895    Other complication of colostomy or enterostomy     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    Followed by surgeon Dr Jeri Ahmadi Unspecified intestinal obstruction     Urinary tract infection 6/18/2021    Ventral hernia, unspecified, without mention of obstruction or gangrene      Peripheral IV 08/03/21 Left;Posterior Hand (Active)   Site Assessment Clean, dry, & intact 08/06/21 0756   Phlebitis Assessment 0 08/06/21 0756   Infiltration Assessment 0 08/06/21 0756   Dressing Status Clean, dry, & intact 08/06/21 0756   Dressing Type Tape;Transparent 08/06/21 0756   Hub Color/Line Status Infusing 08/06/21 0756   Alcohol Cap Used No 08/05/21 1526                 Nephroureteral Drain 08/06/21 Left Ureter (Active)

## 2021-08-06 NOTE — PROGRESS NOTES
Up in and ambulating. No bowel movement but reports passing some gas after suppository. States\" My stomach still does not feel right\" Denies need for nausea or pain medication. Hourly rounds completed, all needs met this shift. Will continue to monitor until night shift nurse takes over. Nephro tube patent.

## 2021-08-06 NOTE — PROGRESS NOTES
Hourly rounds done. Pt denies pain, nausea, vomiting. Urostomy output yellow/straw, clear. NT output kushal/brown, clear. Pt Pt, had BM late evening yesterday before PM shift. NPO @ MN. Afebrile. All needs met at this time.

## 2021-08-06 NOTE — PROGRESS NOTES
Admit Date: 8/3/2021    Subjective:     Claudette Severe reports nausea resolved. Abdomen softer today. Had BM. NT with kushal colored UOP. Afebrile, VSS. Labs improved. Objective:     Patient Vitals for the past 8 hrs:   BP Temp Pulse Resp SpO2 Weight   08/06/21 1125 (!) 148/78 98.7 °F (37.1 °C) 60 16 98 % --   08/06/21 0801 (!) 145/79 98.2 °F (36.8 °C) 64 -- 96 % --   08/06/21 0559 -- -- -- -- -- 184 lb 4.9 oz (83.6 kg)   08/06/21 0511 (!) 154/80 98.6 °F (37 °C) 61 18 96 % --     No intake/output data recorded. 08/04 1901 - 08/06 0700  In: 3936 [P.O.:840; I.V.:3096]  Out: 1900 [Urine:1900]    Physical Exam:  GENERAL: alert, cooperative, no distress  LUNG: clear to auscultation bilaterally  HEART: regular rate and rhythm, S1, S2   ABDOMEN: soft, non-tender  NEUROLOGIC: AOx3    Data Review   Recent Results (from the past 24 hour(s))   METABOLIC PANEL, BASIC    Collection Time: 08/05/21  3:30 PM   Result Value Ref Range    Sodium 139 136 - 145 mmol/L    Potassium 4.3 3.5 - 5.1 mmol/L    Chloride 107 98 - 107 mmol/L    CO2 27 21 - 32 mmol/L    Anion gap 5 (L) 7 - 16 mmol/L    Glucose 108 (H) 65 - 100 mg/dL    BUN 15 8 - 23 MG/DL    Creatinine 1.33 0.8 - 1.5 MG/DL    GFR est AA >60 >60 ml/min/1.73m2    GFR est non-AA 55 (L) >60 ml/min/1.73m2    Calcium 9.2 8.3 - 10.4 MG/DL       Assessment:     Active Problems:    Ureteral stone (8/3/2021)      POD 3:     PREOPERATIVE DIAGNOSIS:  Left ureteral calculus. POSTOPERATIVE DIAGNOSIS:  No stone seen. PROCEDURE PERFORMED:  Left antegrade ureteropyeloscopy, left antegrade pyelogram with interpretation of pyelogram.     Afebrile, VSS  WBC 6.2  Hgb 10.3  Cn 1.33  Glucose- 108    Plan: To IR today for antegrade stent placement. Remove NT. Hopefully home tomorrow if pt continues to progress. Will continue stent x 6 weeks. Brett Lopez NP  [facility]  I have reviewed the above note and examined the patient.   I agree with the exam, assessment and plan.     Kimberly President., MD

## 2021-08-06 NOTE — PROGRESS NOTES
Report called to [place], RN for pt to return to room 637. All questions answered. Pt does not need to be NPO from IR standpoint.

## 2021-08-06 NOTE — PROCEDURES
Department of Interventional Radiology  [phone]        Interventional Radiology Brief Procedure Note    Patient: Celestino Gresham MRN: [id number]  SSN: [id number]    YOB: 1940  Age: 80 y.o. Sex: male      Date of Procedure: 8/6/2021    Pre-Procedure Diagnosis: Distal ureteral stricture/stones    Post-Procedure Diagnosis: SAME    Procedure(s): Percutaneous Nephro-ureteral drain Placement    Brief Description of Procedure: as above    Performed By: Kristen Brown MD     Assistants: None    Anesthesia:Moderate Sedation    Estimated Blood Loss: Less than 10ml    Specimens:  None    Implants:  Nephro-Ureteral Drain    Findings: Stricture at the anastomosis. No stones identified. Complications: None    Recommendations: Gravidty bag drainage until urine clears or until he returns for follow up. Follow Up: 4-6 weeks. Consider converting to a retrograde drain at that time.       Signed By: Kristen Brown MD     August 6, 2021

## 2021-08-06 NOTE — PROGRESS NOTES
IR Nurse Pre-Procedure Checklist Part 2          Consent signed: Yes    H&P complete:  Yes    Antibiotics: Not applicable    Airway/Mallampati Done: Yes    Shaved: Not applicable    Pregnancy Form:Not applicable    Patient Position: Yes    MD Side: Yes     Biopsy Worksheet: Not applicable    Specimen Medium: Not applicable    Pt to IR Suite 1 via stretcher with RN.

## 2021-08-07 VITALS
RESPIRATION RATE: 20 BRPM | WEIGHT: 184.08 LBS | DIASTOLIC BLOOD PRESSURE: 84 MMHG | HEART RATE: 69 BPM | SYSTOLIC BLOOD PRESSURE: 140 MMHG | OXYGEN SATURATION: 97 % | TEMPERATURE: 98.4 F | BODY MASS INDEX: 26.35 KG/M2 | HEIGHT: 70 IN

## 2021-08-07 PROCEDURE — 3331090001 HH PPS REVENUE CREDIT

## 2021-08-07 PROCEDURE — 3331090002 HH PPS REVENUE DEBIT

## 2021-08-07 PROCEDURE — 74011250637 HC RX REV CODE- 250/637: Performed by: UROLOGY

## 2021-08-07 PROCEDURE — 74011250636 HC RX REV CODE- 250/636: Performed by: NURSE PRACTITIONER

## 2021-08-07 RX ORDER — HYDROCODONE BITARTRATE AND ACETAMINOPHEN 5; 325 MG/1; MG/1
1 TABLET ORAL
Qty: 15 TABLET | Refills: 0 | Status: SHIPPED | OUTPATIENT
Start: 2021-08-07 | End: 2021-08-12

## 2021-08-07 RX ADMIN — Medication 10 ML: at 14:00

## 2021-08-07 RX ADMIN — Medication 10 ML: at 06:00

## 2021-08-07 RX ADMIN — SODIUM CHLORIDE 100 ML/HR: 900 INJECTION, SOLUTION INTRAVENOUS at 11:57

## 2021-08-07 RX ADMIN — ONDANSETRON 4 MG: 4 TABLET, ORALLY DISINTEGRATING ORAL at 08:29

## 2021-08-07 RX ADMIN — ASPIRIN 81 MG: 81 TABLET ORAL at 08:29

## 2021-08-07 RX ADMIN — FLUOXETINE 20 MG: 20 CAPSULE ORAL at 08:29

## 2021-08-07 RX ADMIN — LOSARTAN POTASSIUM 100 MG: 50 TABLET, FILM COATED ORAL at 08:29

## 2021-08-07 NOTE — PROGRESS NOTES
Hourly rounds done. Pt denies pain, nausea, vomiting. Urostomy output yellow/straw, clear. NT output brown/maroon. No BM this shift. Afebrile. All needs met at this time.

## 2021-08-07 NOTE — PROGRESS NOTES
Pt is for discharge home today with caregiver. Referral called to [facility] to resume follow up home care as ordered. No additional CM orders received or supportive care needs expressed at this time. Care Management Interventions  PCP Verified by CM: Yes (Nadir Lan MD)  Mode of Transport at Discharge:  Other (see comment)  Transition of Care Consult (CM Consult): Discharge Planning, Home Health (Patient active with [facility])  [address]: Yes  Discharge Durable Medical Equipment: No  Physical Therapy Consult: No  Occupational Therapy Consult: No  Speech Therapy Consult: No  Current Support Network: Own Home, Lives Alone  Confirm Follow Up Transport: Family  The Patient and/or Patient Representative was Provided with a Choice of Provider and Agrees with the Discharge Plan?: No  Freedom of Choice List was Provided with Basic Dialogue that Supports the Patient's Individualized Plan of Care/Goals, Treatment Preferences and Shares the Quality Data Associated with the Providers?: No  [place] Resource Information Provided?: No  Discharge Location  Discharge Placement: Home with home health (patient is active with [facility] will be resumed upon d/c home)

## 2021-08-07 NOTE — PROGRESS NOTES
Problem: Falls - Risk of  Goal: *Absence of Falls  Description: Document [place] Fall Risk and appropriate interventions in the flowsheet.   Outcome: Progressing Towards Goal  Note: Fall Risk Interventions:  Mobility Interventions: Strengthening exercises (ROM-active/passive)    Mentation Interventions: Reorient patient    Medication Interventions: Teach patient to arise slowly    Elimination Interventions: Call light in reach    History of Falls Interventions: Bed/chair exit alarm, Consult care management for discharge planning, Door open when patient unattended, Evaluate medications/consider consulting pharmacy, Investigate reason for fall, Room close to nurse's station, Utilize gait belt for transfer/ambulation, Assess for delayed presentation/identification of injury for 48 hrs (comment for end date), Vital signs minimum Q4HRs X 24 hrs (comment for end date)

## 2021-08-07 NOTE — PROGRESS NOTES
Subjective:   Daily Progress Note: 2021 1:51 PM  No Complaints. Had left nephroureteral drain placed yesterday . findings were narrowing of left ureteroileal anastomosis. Objective:     Visit Vitals  BP (!) 152/78 (BP 1 Location: Left upper arm, BP Patient Position: At rest)   Pulse 65   Temp 98.6 °F (37 °C)   Resp 20   Ht 5' 10\" (1.778 m)   Wt 184 lb 1.4 oz (83.5 kg)   SpO2 98%   BMI 26.41 kg/m²    O2 Flow Rate (L/min): 2 l/min O2 Device: None (Room air)    Temp (24hrs), Av.1 °F (36.7 °C), Min:97.5 °F (36.4 °C), Max:98.6 °F (37 °C)      1901 -  0700  In: 2985 [I.V.:2985]  Out: 1550 [WVHIO:8257]   07 -  1900  In: 180 [P.O.:180]  Out: 200 [Urine:200]    [unfilled]  [unfilled]  [unfilled]    Exam:       Data Review    No results found for this or any previous visit (from the past 24 hour(s)). Assessment   Active Problems:    Ureteral stone (8/3/2021)        Plan:  OK for discharge today . has follow up appt with IR to internalize stent in left ureter and ileal conduit .  appt with me in 3 weeks

## 2021-08-07 NOTE — PROGRESS NOTES
Discharge instructions given. Pt verbalized understanding. Care giver present during discharge instructions. All questions answered. PIV removed.

## 2021-08-08 PROCEDURE — 3331090001 HH PPS REVENUE CREDIT

## 2021-08-08 PROCEDURE — 3331090002 HH PPS REVENUE DEBIT

## 2021-08-09 ENCOUNTER — HOME CARE VISIT (OUTPATIENT)
Dept: SCHEDULING | Facility: HOME HEALTH | Age: 81
End: 2021-08-09
Payer: MEDICARE

## 2021-08-09 PROCEDURE — G0299 HHS/HOSPICE OF RN EA 15 MIN: HCPCS

## 2021-08-09 PROCEDURE — 3331090001 HH PPS REVENUE CREDIT

## 2021-08-09 PROCEDURE — 3331090002 HH PPS REVENUE DEBIT

## 2021-08-10 PROCEDURE — 3331090001 HH PPS REVENUE CREDIT

## 2021-08-10 PROCEDURE — 3331090002 HH PPS REVENUE DEBIT

## 2021-08-11 PROCEDURE — 3331090002 HH PPS REVENUE DEBIT

## 2021-08-11 PROCEDURE — 3331090001 HH PPS REVENUE CREDIT

## 2021-08-12 ENCOUNTER — HOME CARE VISIT (OUTPATIENT)
Dept: SCHEDULING | Facility: HOME HEALTH | Age: 81
End: 2021-08-12
Payer: MEDICARE

## 2021-08-12 VITALS
TEMPERATURE: 98.4 F | SYSTOLIC BLOOD PRESSURE: 148 MMHG | HEART RATE: 66 BPM | RESPIRATION RATE: 15 BRPM | DIASTOLIC BLOOD PRESSURE: 70 MMHG

## 2021-08-12 PROCEDURE — G0151 HHCP-SERV OF PT,EA 15 MIN: HCPCS

## 2021-08-12 PROCEDURE — 3331090002 HH PPS REVENUE DEBIT

## 2021-08-12 PROCEDURE — 3331090001 HH PPS REVENUE CREDIT

## 2021-08-13 PROCEDURE — 3331090001 HH PPS REVENUE CREDIT

## 2021-08-13 PROCEDURE — 3331090002 HH PPS REVENUE DEBIT

## 2021-08-14 PROCEDURE — 3331090001 HH PPS REVENUE CREDIT

## 2021-08-14 PROCEDURE — 3331090002 HH PPS REVENUE DEBIT

## 2021-08-15 PROCEDURE — 3331090001 HH PPS REVENUE CREDIT

## 2021-08-15 PROCEDURE — 3331090002 HH PPS REVENUE DEBIT

## 2021-08-16 PROCEDURE — 3331090001 HH PPS REVENUE CREDIT

## 2021-08-16 PROCEDURE — 3331090002 HH PPS REVENUE DEBIT

## 2021-08-16 NOTE — DISCHARGE SUMMARY
.  Discharge Summary     Patient: Armando Arceo MRN: [id number]  SSN: [id number]    YOB: 1940  Age: 80 y.o. Sex: male      Allergies: Patient has no known allergies. Admit Date: 8/3/2021    Discharge Date: 8/16/2021     * Admission Diagnoses:  Kidney stone [N20.0]  Ureteral stone [N20.1]     * Discharge Diagnoses:   Hospital Problems as of 8/7/2021 Date Reviewed: 7/12/2021        Codes Class Noted - Resolved POA    Ureteral stone ICD-10-CM: N20.1  ICD-9-CM: 592.1  8/3/2021 - Present Unknown               * Procedures for this admission:   Procedure(s):  left antegrade ureteropyeloscopy  Cardiology and IR Orders (From admission to next 24h)     Start     Ordered    08/06/21 0915  IR EXCHANGE NEPHRO PERC LT SI  [[id number]]  ONE TIME      08/06/21 0910                * Disposition: Home    * Discharged Condition: improved    * Hospital Course:  Tina Barber a 68 y. o. 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  Admitted 6-18-21 with urosepsis. CT urogram 6-19-21 showed moderately severe left hydroureteronephrosis with multiple, (at least 5) small, 2-5 mm stones in tandem in the lower left ureter. Had left nephrostomy tube placement 6-21-21. Urine culture 7/27 growing E. Coli and klebsiella,  txt with bactrim. He was scheduled for left PCNL. He had left antegrade ureteropyeloscopy, left antegrade pyelogram with interpretation of pyelogram on 8/3 with no evidence of ureteral stones seen during flexible endoscopy of the left collecting system. He continued with NT to drainage. He could not tolerate capping of NT. He underwent NU drain placement on 8/6 with findings of stricture at the anastomosis with no stones identified. He will discharge today and he has follow up appt with IR to internalize stent in left ureter and ileal conduit. He will follow up with Dr. Kamla Aguayo in around 3 weeks.          Patient Active Problem List   Diagnosis Code    Coronary artery disease involving coronary bypass graft of native heart without angina pectoris I25.810    Essential hypertension I10    Mixed hyperlipidemia E78.2    Major depressive disorder with single episode, in full remission ([facility] [address] F32.5    Stage 3a chronic kidney disease ([facility] [address] N18.31    Ureteral stone N20.1             Discharge Medications:   Discharge Medication List as of 8/7/2021  4:15 PM      START taking these medications    Details   HYDROcodone-acetaminophen (NORCO) 5-325 mg per tablet Take 1 Tablet by mouth every four (4) hours as needed for Pain for up to 5 days. Max Daily Amount: 6 Tablets., Normal, Disp-15 Tablet, R-0         CONTINUE these medications which have NOT CHANGED    Details   acetaminophen (TYLENOL) 500 mg tablet Take 500 mg by mouth every six (6) hours as needed for Pain. Take 1-2 tablets every 6 hrs as needed, Historical Med      losartan (COZAAR) 100 mg tablet Take 1 Tab by mouth daily. , Normal, Disp-90 Tab, R-3      FLUoxetine (PROzac) 20 mg capsule Take 1 Cap by mouth daily. , Normal, Disp-90 Cap, R-1      simvastatin (ZOCOR) 80 mg tablet TAKE 1 TABLET AT BEDTIME, Normal, Disp-90 Tab,R-3      aspirin delayed-release 81 mg tablet Take 1 Tab by mouth daily. Indications: MYOCARDIAL INFARCTION PREVENTION, No Print, Disp-1 Tab, R-1         STOP taking these medications       trimethoprim-sulfamethoxazole (Bactrim DS) 160-800 mg per tablet Comments:   Reason for Stopping:                * Follow-up Care/Patient Instructions:   Activity: Activity as tolerated  Diet: Regular Diet  Wound Care: None needed    Follow-up Information     Follow up With Specialties Details Why Contact Info    Dolores Santiago MD Internal Medicine   [facility] [address] [facility] [address] [phone]

## 2021-08-17 ENCOUNTER — HOME CARE VISIT (OUTPATIENT)
Dept: SCHEDULING | Facility: HOME HEALTH | Age: 81
End: 2021-08-17
Payer: MEDICARE

## 2021-08-17 VITALS
TEMPERATURE: 98.4 F | RESPIRATION RATE: 18 BRPM | DIASTOLIC BLOOD PRESSURE: 86 MMHG | OXYGEN SATURATION: 98 % | HEART RATE: 54 BPM | SYSTOLIC BLOOD PRESSURE: 153 MMHG

## 2021-08-17 PROCEDURE — G0299 HHS/HOSPICE OF RN EA 15 MIN: HCPCS

## 2021-08-17 PROCEDURE — 3331090001 HH PPS REVENUE CREDIT

## 2021-08-17 PROCEDURE — 3331090002 HH PPS REVENUE DEBIT

## 2021-08-18 PROCEDURE — 3331090002 HH PPS REVENUE DEBIT

## 2021-08-18 PROCEDURE — 3331090001 HH PPS REVENUE CREDIT

## 2021-08-19 ENCOUNTER — HOME CARE VISIT (OUTPATIENT)
Dept: SCHEDULING | Facility: HOME HEALTH | Age: 81
End: 2021-08-19
Payer: MEDICARE

## 2021-08-19 VITALS
SYSTOLIC BLOOD PRESSURE: 138 MMHG | DIASTOLIC BLOOD PRESSURE: 70 MMHG | HEART RATE: 54 BPM | RESPIRATION RATE: 18 BRPM | OXYGEN SATURATION: 99 % | TEMPERATURE: 98.1 F

## 2021-08-19 PROCEDURE — 3331090002 HH PPS REVENUE DEBIT

## 2021-08-19 PROCEDURE — G0299 HHS/HOSPICE OF RN EA 15 MIN: HCPCS

## 2021-08-19 PROCEDURE — G0152 HHCP-SERV OF OT,EA 15 MIN: HCPCS

## 2021-08-19 PROCEDURE — A4450 NON-WATERPROOF TAPE: HCPCS

## 2021-08-19 PROCEDURE — 3331090001 HH PPS REVENUE CREDIT

## 2021-08-20 PROCEDURE — 3331090002 HH PPS REVENUE DEBIT

## 2021-08-20 PROCEDURE — 3331090001 HH PPS REVENUE CREDIT

## 2021-08-21 PROCEDURE — 3331090001 HH PPS REVENUE CREDIT

## 2021-08-21 PROCEDURE — 3331090002 HH PPS REVENUE DEBIT

## 2021-08-22 PROCEDURE — 400018 HH-NO PAY CLAIM PROCEDURE

## 2021-08-22 PROCEDURE — 3331090002 HH PPS REVENUE DEBIT

## 2021-08-22 PROCEDURE — 3331090001 HH PPS REVENUE CREDIT

## 2021-08-23 VITALS
OXYGEN SATURATION: 97 % | SYSTOLIC BLOOD PRESSURE: 158 MMHG | TEMPERATURE: 97.7 F | DIASTOLIC BLOOD PRESSURE: 80 MMHG | RESPIRATION RATE: 17 BRPM | HEART RATE: 75 BPM

## 2021-08-23 VITALS
DIASTOLIC BLOOD PRESSURE: 74 MMHG | RESPIRATION RATE: 16 BRPM | HEART RATE: 56 BPM | TEMPERATURE: 97.9 F | SYSTOLIC BLOOD PRESSURE: 148 MMHG | OXYGEN SATURATION: 98 %

## 2021-08-23 PROCEDURE — 3331090002 HH PPS REVENUE DEBIT

## 2021-08-23 PROCEDURE — 3331090001 HH PPS REVENUE CREDIT

## 2021-08-24 ENCOUNTER — HOME CARE VISIT (OUTPATIENT)
Dept: SCHEDULING | Facility: HOME HEALTH | Age: 81
End: 2021-08-24
Payer: MEDICARE

## 2021-08-24 PROCEDURE — 3331090001 HH PPS REVENUE CREDIT

## 2021-08-24 PROCEDURE — 3331090002 HH PPS REVENUE DEBIT

## 2021-08-24 PROCEDURE — 400014 HH F/U

## 2021-08-24 PROCEDURE — G0299 HHS/HOSPICE OF RN EA 15 MIN: HCPCS

## 2021-08-25 PROCEDURE — 3331090001 HH PPS REVENUE CREDIT

## 2021-08-25 PROCEDURE — 3331090002 HH PPS REVENUE DEBIT

## 2021-08-25 NOTE — HOME HEALTH
Patient shares during visit that he is feeling very depressed. He states he learned last week that his youngest son is over his estate. He shares alot that he is concerned with this and states he does not want this. SN will request MD to allow MSW evaluation.

## 2021-08-26 PROCEDURE — 3331090001 HH PPS REVENUE CREDIT

## 2021-08-26 PROCEDURE — 3331090002 HH PPS REVENUE DEBIT

## 2021-08-27 ENCOUNTER — HOME CARE VISIT (OUTPATIENT)
Dept: SCHEDULING | Facility: HOME HEALTH | Age: 81
End: 2021-08-27
Payer: MEDICARE

## 2021-08-27 VITALS
DIASTOLIC BLOOD PRESSURE: 78 MMHG | HEART RATE: 73 BPM | RESPIRATION RATE: 18 BRPM | TEMPERATURE: 98 F | OXYGEN SATURATION: 97 % | SYSTOLIC BLOOD PRESSURE: 122 MMHG

## 2021-08-27 PROCEDURE — G0155 HHCP-SVS OF CSW,EA 15 MIN: HCPCS

## 2021-08-27 PROCEDURE — G0299 HHS/HOSPICE OF RN EA 15 MIN: HCPCS

## 2021-08-27 PROCEDURE — 3331090002 HH PPS REVENUE DEBIT

## 2021-08-27 PROCEDURE — 3331090001 HH PPS REVENUE CREDIT

## 2021-08-28 PROCEDURE — 3331090001 HH PPS REVENUE CREDIT

## 2021-08-28 PROCEDURE — 3331090002 HH PPS REVENUE DEBIT

## 2021-08-29 PROCEDURE — 3331090001 HH PPS REVENUE CREDIT

## 2021-08-29 PROCEDURE — 3331090002 HH PPS REVENUE DEBIT

## 2021-08-30 PROCEDURE — 3331090001 HH PPS REVENUE CREDIT

## 2021-08-30 PROCEDURE — 3331090002 HH PPS REVENUE DEBIT

## 2021-08-31 ENCOUNTER — HOME CARE VISIT (OUTPATIENT)
Dept: SCHEDULING | Facility: HOME HEALTH | Age: 81
End: 2021-08-31
Payer: MEDICARE

## 2021-08-31 VITALS
DIASTOLIC BLOOD PRESSURE: 74 MMHG | RESPIRATION RATE: 18 BRPM | TEMPERATURE: 97.7 F | OXYGEN SATURATION: 98 % | HEART RATE: 60 BPM | SYSTOLIC BLOOD PRESSURE: 122 MMHG

## 2021-08-31 PROCEDURE — 3331090002 HH PPS REVENUE DEBIT

## 2021-08-31 PROCEDURE — G0299 HHS/HOSPICE OF RN EA 15 MIN: HCPCS

## 2021-08-31 PROCEDURE — 3331090001 HH PPS REVENUE CREDIT

## 2021-09-01 PROCEDURE — 3331090002 HH PPS REVENUE DEBIT

## 2021-09-01 PROCEDURE — 3331090001 HH PPS REVENUE CREDIT

## 2021-09-02 PROCEDURE — 3331090002 HH PPS REVENUE DEBIT

## 2021-09-02 PROCEDURE — 3331090001 HH PPS REVENUE CREDIT

## 2021-09-03 ENCOUNTER — HOME CARE VISIT (OUTPATIENT)
Dept: SCHEDULING | Facility: HOME HEALTH | Age: 81
End: 2021-09-03
Payer: MEDICARE

## 2021-09-03 VITALS
HEART RATE: 75 BPM | RESPIRATION RATE: 18 BRPM | DIASTOLIC BLOOD PRESSURE: 70 MMHG | OXYGEN SATURATION: 100 % | TEMPERATURE: 97.5 F | SYSTOLIC BLOOD PRESSURE: 126 MMHG

## 2021-09-03 PROCEDURE — 3331090002 HH PPS REVENUE DEBIT

## 2021-09-03 PROCEDURE — 3331090001 HH PPS REVENUE CREDIT

## 2021-09-03 PROCEDURE — G0299 HHS/HOSPICE OF RN EA 15 MIN: HCPCS

## 2021-09-04 PROCEDURE — 3331090002 HH PPS REVENUE DEBIT

## 2021-09-04 PROCEDURE — 3331090001 HH PPS REVENUE CREDIT

## 2021-09-04 NOTE — HOME HEALTH
Greeted pt in the yard with the use of his power wc. Sn performed education and wound care. ..see intervention. Pt appears stable, no distress noted. Sn instr pt to call with any needs or concerns. .. pt v/u.

## 2021-09-05 PROCEDURE — 3331090002 HH PPS REVENUE DEBIT

## 2021-09-05 PROCEDURE — 3331090001 HH PPS REVENUE CREDIT

## 2021-09-06 PROCEDURE — 3331090002 HH PPS REVENUE DEBIT

## 2021-09-06 PROCEDURE — 3331090001 HH PPS REVENUE CREDIT

## 2021-09-07 ENCOUNTER — HOME CARE VISIT (OUTPATIENT)
Dept: SCHEDULING | Facility: HOME HEALTH | Age: 81
End: 2021-09-07
Payer: MEDICARE

## 2021-09-07 VITALS
WEIGHT: 164 LBS | DIASTOLIC BLOOD PRESSURE: 81 MMHG | TEMPERATURE: 97.3 F | RESPIRATION RATE: 18 BRPM | BODY MASS INDEX: 23.53 KG/M2 | HEART RATE: 79 BPM | SYSTOLIC BLOOD PRESSURE: 126 MMHG | OXYGEN SATURATION: 99 %

## 2021-09-07 PROBLEM — R73.03 PREDIABETES: Status: ACTIVE | Noted: 2021-09-07

## 2021-09-07 PROCEDURE — G0299 HHS/HOSPICE OF RN EA 15 MIN: HCPCS

## 2021-09-07 PROCEDURE — A6216 NON-STERILE GAUZE<=16 SQ IN: HCPCS

## 2021-09-07 PROCEDURE — A4357 BEDSIDE DRAINAGE BAG: HCPCS

## 2021-09-07 PROCEDURE — 3331090002 HH PPS REVENUE DEBIT

## 2021-09-07 PROCEDURE — A6253 ABSORPT DRG > 48 SQ IN W/O B: HCPCS

## 2021-09-07 PROCEDURE — 3331090001 HH PPS REVENUE CREDIT

## 2021-09-07 PROCEDURE — MED11943

## 2021-09-08 PROCEDURE — 3331090002 HH PPS REVENUE DEBIT

## 2021-09-08 PROCEDURE — 3331090001 HH PPS REVENUE CREDIT

## 2021-09-09 PROCEDURE — 3331090001 HH PPS REVENUE CREDIT

## 2021-09-09 PROCEDURE — 3331090002 HH PPS REVENUE DEBIT

## 2021-09-10 ENCOUNTER — HOME CARE VISIT (OUTPATIENT)
Dept: SCHEDULING | Facility: HOME HEALTH | Age: 81
End: 2021-09-10
Payer: MEDICARE

## 2021-09-10 PROCEDURE — 3331090001 HH PPS REVENUE CREDIT

## 2021-09-10 PROCEDURE — 3331090002 HH PPS REVENUE DEBIT

## 2021-09-10 PROCEDURE — G0299 HHS/HOSPICE OF RN EA 15 MIN: HCPCS

## 2021-09-11 PROCEDURE — 3331090002 HH PPS REVENUE DEBIT

## 2021-09-11 PROCEDURE — 3331090001 HH PPS REVENUE CREDIT

## 2021-09-12 PROCEDURE — 3331090001 HH PPS REVENUE CREDIT

## 2021-09-12 PROCEDURE — 3331090002 HH PPS REVENUE DEBIT

## 2021-09-13 PROCEDURE — 3331090002 HH PPS REVENUE DEBIT

## 2021-09-13 PROCEDURE — 3331090001 HH PPS REVENUE CREDIT

## 2021-09-14 ENCOUNTER — HOME CARE VISIT (OUTPATIENT)
Dept: SCHEDULING | Facility: HOME HEALTH | Age: 81
End: 2021-09-14
Payer: MEDICARE

## 2021-09-14 VITALS
DIASTOLIC BLOOD PRESSURE: 80 MMHG | OXYGEN SATURATION: 96 % | RESPIRATION RATE: 17 BRPM | TEMPERATURE: 98.4 F | HEART RATE: 62 BPM | SYSTOLIC BLOOD PRESSURE: 158 MMHG

## 2021-09-14 PROCEDURE — 3331090001 HH PPS REVENUE CREDIT

## 2021-09-14 PROCEDURE — 3331090002 HH PPS REVENUE DEBIT

## 2021-09-14 PROCEDURE — G0299 HHS/HOSPICE OF RN EA 15 MIN: HCPCS

## 2021-09-15 PROCEDURE — 3331090002 HH PPS REVENUE DEBIT

## 2021-09-15 PROCEDURE — 3331090001 HH PPS REVENUE CREDIT

## 2021-09-16 PROCEDURE — 3331090001 HH PPS REVENUE CREDIT

## 2021-09-16 PROCEDURE — 3331090002 HH PPS REVENUE DEBIT

## 2021-09-17 ENCOUNTER — HOME CARE VISIT (OUTPATIENT)
Dept: SCHEDULING | Facility: HOME HEALTH | Age: 81
End: 2021-09-17
Payer: MEDICARE

## 2021-09-17 VITALS
DIASTOLIC BLOOD PRESSURE: 70 MMHG | SYSTOLIC BLOOD PRESSURE: 140 MMHG | HEART RATE: 80 BPM | OXYGEN SATURATION: 96 % | TEMPERATURE: 98.3 F | RESPIRATION RATE: 12 BRPM

## 2021-09-17 PROCEDURE — G0299 HHS/HOSPICE OF RN EA 15 MIN: HCPCS

## 2021-09-17 PROCEDURE — 3331090002 HH PPS REVENUE DEBIT

## 2021-09-17 PROCEDURE — A4409 OST SKN BARR CONVEX <=4 SQ I: HCPCS

## 2021-09-17 PROCEDURE — 3331090001 HH PPS REVENUE CREDIT

## 2021-09-17 PROCEDURE — A4412 OST POUCH DRAIN HIGH OUTPUT: HCPCS

## 2021-09-18 PROCEDURE — 3331090002 HH PPS REVENUE DEBIT

## 2021-09-18 PROCEDURE — 3331090001 HH PPS REVENUE CREDIT

## 2021-09-19 PROCEDURE — 3331090002 HH PPS REVENUE DEBIT

## 2021-09-19 PROCEDURE — 3331090001 HH PPS REVENUE CREDIT

## 2021-09-20 PROCEDURE — 3331090002 HH PPS REVENUE DEBIT

## 2021-09-20 PROCEDURE — 3331090001 HH PPS REVENUE CREDIT

## 2021-09-21 ENCOUNTER — HOME CARE VISIT (OUTPATIENT)
Dept: SCHEDULING | Facility: HOME HEALTH | Age: 81
End: 2021-09-21
Payer: MEDICARE

## 2021-09-21 VITALS
OXYGEN SATURATION: 99 % | DIASTOLIC BLOOD PRESSURE: 78 MMHG | HEART RATE: 53 BPM | RESPIRATION RATE: 18 BRPM | TEMPERATURE: 98.6 F | SYSTOLIC BLOOD PRESSURE: 151 MMHG

## 2021-09-21 PROCEDURE — 3331090002 HH PPS REVENUE DEBIT

## 2021-09-21 PROCEDURE — 400018 HH-NO PAY CLAIM PROCEDURE

## 2021-09-21 PROCEDURE — 3331090001 HH PPS REVENUE CREDIT

## 2021-09-21 PROCEDURE — G0299 HHS/HOSPICE OF RN EA 15 MIN: HCPCS

## 2021-09-21 PROCEDURE — 400014 HH F/U

## 2021-09-22 PROCEDURE — 3331090002 HH PPS REVENUE DEBIT

## 2021-09-22 PROCEDURE — 3331090001 HH PPS REVENUE CREDIT

## 2021-09-23 ENCOUNTER — HOSPITAL ENCOUNTER (OUTPATIENT)
Dept: INTERVENTIONAL RADIOLOGY/VASCULAR | Age: 81
Discharge: HOME | End: 2021-09-23
Attending: RADIOLOGY

## 2021-09-23 PROCEDURE — 3331090002 HH PPS REVENUE DEBIT

## 2021-09-23 PROCEDURE — 3331090001 HH PPS REVENUE CREDIT

## 2021-09-24 ENCOUNTER — HOME CARE VISIT (OUTPATIENT)
Dept: SCHEDULING | Facility: HOME HEALTH | Age: 81
End: 2021-09-24
Payer: MEDICARE

## 2021-09-24 VITALS
SYSTOLIC BLOOD PRESSURE: 162 MMHG | DIASTOLIC BLOOD PRESSURE: 84 MMHG | OXYGEN SATURATION: 97 % | TEMPERATURE: 98.3 F | HEART RATE: 55 BPM | RESPIRATION RATE: 17 BRPM

## 2021-09-24 PROCEDURE — G0299 HHS/HOSPICE OF RN EA 15 MIN: HCPCS

## 2021-09-24 PROCEDURE — 3331090002 HH PPS REVENUE DEBIT

## 2021-09-24 PROCEDURE — 3331090001 HH PPS REVENUE CREDIT

## 2021-09-25 PROCEDURE — 3331090001 HH PPS REVENUE CREDIT

## 2021-09-25 PROCEDURE — 3331090002 HH PPS REVENUE DEBIT

## 2021-09-26 PROCEDURE — 3331090001 HH PPS REVENUE CREDIT

## 2021-09-26 PROCEDURE — 3331090002 HH PPS REVENUE DEBIT

## 2021-09-27 ENCOUNTER — HOME CARE VISIT (OUTPATIENT)
Dept: SCHEDULING | Facility: HOME HEALTH | Age: 81
End: 2021-09-27
Payer: MEDICARE

## 2021-09-27 VITALS
OXYGEN SATURATION: 99 % | DIASTOLIC BLOOD PRESSURE: 81 MMHG | SYSTOLIC BLOOD PRESSURE: 148 MMHG | TEMPERATURE: 98.3 F | RESPIRATION RATE: 18 BRPM | HEART RATE: 54 BPM

## 2021-09-27 PROCEDURE — G0299 HHS/HOSPICE OF RN EA 15 MIN: HCPCS

## 2021-09-27 PROCEDURE — 3331090002 HH PPS REVENUE DEBIT

## 2021-09-27 PROCEDURE — 3331090001 HH PPS REVENUE CREDIT

## 2021-09-28 PROCEDURE — 3331090002 HH PPS REVENUE DEBIT

## 2021-09-28 PROCEDURE — 3331090001 HH PPS REVENUE CREDIT

## 2021-09-29 PROCEDURE — 3331090002 HH PPS REVENUE DEBIT

## 2021-09-29 PROCEDURE — 3331090001 HH PPS REVENUE CREDIT

## 2021-09-30 ENCOUNTER — HOME CARE VISIT (OUTPATIENT)
Dept: SCHEDULING | Facility: HOME HEALTH | Age: 81
End: 2021-09-30
Payer: MEDICARE

## 2021-09-30 VITALS
OXYGEN SATURATION: 98 % | SYSTOLIC BLOOD PRESSURE: 159 MMHG | RESPIRATION RATE: 18 BRPM | TEMPERATURE: 98.3 F | HEART RATE: 57 BPM | DIASTOLIC BLOOD PRESSURE: 86 MMHG

## 2021-09-30 PROCEDURE — 3331090001 HH PPS REVENUE CREDIT

## 2021-09-30 PROCEDURE — G0299 HHS/HOSPICE OF RN EA 15 MIN: HCPCS

## 2021-09-30 PROCEDURE — 3331090002 HH PPS REVENUE DEBIT

## 2021-10-01 PROCEDURE — 3331090002 HH PPS REVENUE DEBIT

## 2021-10-01 PROCEDURE — 3331090001 HH PPS REVENUE CREDIT

## 2021-10-02 PROCEDURE — 3331090002 HH PPS REVENUE DEBIT

## 2021-10-02 PROCEDURE — 3331090001 HH PPS REVENUE CREDIT

## 2021-10-03 PROCEDURE — 3331090001 HH PPS REVENUE CREDIT

## 2021-10-03 PROCEDURE — 3331090002 HH PPS REVENUE DEBIT

## 2021-10-04 ENCOUNTER — HOME CARE VISIT (OUTPATIENT)
Dept: SCHEDULING | Facility: HOME HEALTH | Age: 81
End: 2021-10-04
Payer: MEDICARE

## 2021-10-04 VITALS
SYSTOLIC BLOOD PRESSURE: 124 MMHG | TEMPERATURE: 98.3 F | DIASTOLIC BLOOD PRESSURE: 77 MMHG | HEART RATE: 47 BPM | OXYGEN SATURATION: 98 % | RESPIRATION RATE: 18 BRPM

## 2021-10-04 PROCEDURE — 3331090002 HH PPS REVENUE DEBIT

## 2021-10-04 PROCEDURE — G0299 HHS/HOSPICE OF RN EA 15 MIN: HCPCS

## 2021-10-04 PROCEDURE — 3331090001 HH PPS REVENUE CREDIT

## 2021-10-05 PROCEDURE — 3331090002 HH PPS REVENUE DEBIT

## 2021-10-05 PROCEDURE — 3331090001 HH PPS REVENUE CREDIT

## 2021-10-06 PROCEDURE — 3331090001 HH PPS REVENUE CREDIT

## 2021-10-06 PROCEDURE — 3331090002 HH PPS REVENUE DEBIT

## 2021-10-07 ENCOUNTER — HOME CARE VISIT (OUTPATIENT)
Dept: SCHEDULING | Facility: HOME HEALTH | Age: 81
End: 2021-10-07
Payer: MEDICARE

## 2021-10-07 VITALS
OXYGEN SATURATION: 98 % | SYSTOLIC BLOOD PRESSURE: 143 MMHG | RESPIRATION RATE: 18 BRPM | DIASTOLIC BLOOD PRESSURE: 82 MMHG | TEMPERATURE: 98.4 F | HEART RATE: 53 BPM

## 2021-10-07 PROCEDURE — G0299 HHS/HOSPICE OF RN EA 15 MIN: HCPCS

## 2021-10-07 PROCEDURE — 3331090002 HH PPS REVENUE DEBIT

## 2021-10-07 PROCEDURE — 3331090001 HH PPS REVENUE CREDIT

## 2021-10-08 PROCEDURE — 3331090002 HH PPS REVENUE DEBIT

## 2021-10-08 PROCEDURE — 3331090001 HH PPS REVENUE CREDIT

## 2021-10-09 PROCEDURE — 3331090002 HH PPS REVENUE DEBIT

## 2021-10-09 PROCEDURE — 3331090001 HH PPS REVENUE CREDIT

## 2021-10-10 PROCEDURE — 3331090001 HH PPS REVENUE CREDIT

## 2021-10-10 PROCEDURE — 3331090002 HH PPS REVENUE DEBIT

## 2021-10-11 ENCOUNTER — HOME CARE VISIT (OUTPATIENT)
Dept: SCHEDULING | Facility: HOME HEALTH | Age: 81
End: 2021-10-11
Payer: MEDICARE

## 2021-10-11 VITALS
HEART RATE: 60 BPM | SYSTOLIC BLOOD PRESSURE: 151 MMHG | TEMPERATURE: 98.4 F | OXYGEN SATURATION: 98 % | DIASTOLIC BLOOD PRESSURE: 67 MMHG | RESPIRATION RATE: 18 BRPM

## 2021-10-11 PROCEDURE — 3331090002 HH PPS REVENUE DEBIT

## 2021-10-11 PROCEDURE — 3331090001 HH PPS REVENUE CREDIT

## 2021-10-11 PROCEDURE — G0299 HHS/HOSPICE OF RN EA 15 MIN: HCPCS

## 2021-10-12 ENCOUNTER — HOSPITAL ENCOUNTER (OUTPATIENT)
Dept: INTERVENTIONAL RADIOLOGY/VASCULAR | Age: 81
Discharge: HOME | End: 2021-10-12
Attending: RADIOLOGY
Payer: MEDICARE

## 2021-10-12 VITALS
HEIGHT: 70 IN | SYSTOLIC BLOOD PRESSURE: 167 MMHG | TEMPERATURE: 97.3 F | RESPIRATION RATE: 18 BRPM | DIASTOLIC BLOOD PRESSURE: 77 MMHG | WEIGHT: 165 LBS | OXYGEN SATURATION: 99 % | BODY MASS INDEX: 23.62 KG/M2 | HEART RATE: 60 BPM

## 2021-10-12 DIAGNOSIS — N13.5 URETERAL STRICTURE: ICD-10-CM

## 2021-10-12 PROCEDURE — 77030025702 HC BG URIN DRN MRTM -A

## 2021-10-12 PROCEDURE — 74011000636 HC RX REV CODE- 636: Performed by: RADIOLOGY

## 2021-10-12 PROCEDURE — 77030002916 HC SUT ETHLN J&J -A

## 2021-10-12 PROCEDURE — 77030013076 HC PCH OST BAG COLO -A

## 2021-10-12 PROCEDURE — 74011000250 HC RX REV CODE- 250: Performed by: RADIOLOGY

## 2021-10-12 PROCEDURE — C1769 GUIDE WIRE: HCPCS

## 2021-10-12 PROCEDURE — 3331090002 HH PPS REVENUE DEBIT

## 2021-10-12 PROCEDURE — 50435 EXCHANGE NEPHROSTOMY CATH: CPT

## 2021-10-12 PROCEDURE — 2709999900 HC NON-CHARGEABLE SUPPLY

## 2021-10-12 PROCEDURE — 3331090001 HH PPS REVENUE CREDIT

## 2021-10-12 PROCEDURE — C2625 STENT, NON-COR, TEM W/DEL SY: HCPCS

## 2021-10-12 RX ORDER — LIDOCAINE HYDROCHLORIDE 20 MG/ML
1-10 INJECTION, SOLUTION INFILTRATION; PERINEURAL
Status: DISCONTINUED | OUTPATIENT
Start: 2021-10-12 | End: 2021-10-16 | Stop reason: HOSPADM

## 2021-10-12 RX ADMIN — IOPAMIDOL 15 ML: 612 INJECTION, SOLUTION INTRAVENOUS at 11:08

## 2021-10-12 RX ADMIN — LIDOCAINE HYDROCHLORIDE 100 MG: 20 INJECTION, SOLUTION INFILTRATION; PERINEURAL at 11:06

## 2021-10-12 NOTE — PROCEDURES
Department of Interventional Radiology  [phone]        Interventional Radiology Brief Procedure Note    Patient: Savanna Gomez MRN: [id number]  SSN: [id number]    YOB: 1940  Age: 80 y.o.   Sex: male      Date of Procedure: 10/12/2021    Pre-Procedure Diagnosis: ureteral obstruction    Post-Procedure Diagnosis: SAME    Procedure(s): Percutaneous Nephrostomy Tube Exchange    Brief Description of Procedure: NUS exchange    Performed By: Mandeep Álvarez MD     Assistants: None    Anesthesia:Lidocaine    Estimated Blood Loss: Less than 10ml    Specimens:  None    Implants:  Nephro-Ureteral Drain    Findings: no hydro    Complications: None    Recommendations: cap drain     Follow Up: one month for possible reversing drain    Signed By: Mandeep Álvarez MD     October 12, 2021

## 2021-10-12 NOTE — PROGRESS NOTES
TRANSFER - OUT REPORT:    Verbal report given to Kaylee Fish RN(name) on Genaro Padilla  being transferred to IR Recovery(unit) for routine post - op       Report consisted of patients Situation, Background, Assessment and   Recommendations(SBAR). Information from the following report(s) SBAR and Procedure Summary was reviewed with the receiving nurse. Opportunity for questions and clarification was provided. Pt tolerated procedure well.      Visit Vitals  BP (!) 167/77   Pulse 60   Temp 97.3 °F (36.3 °C)   Resp 18   Ht 5' 10\" (1.778 m)   Wt 74.8 kg (165 lb)   SpO2 99%   BMI 23.68 kg/m²     Past Medical History:   Diagnosis Date    Acute encephalopathy 6/18/2021    Arthritis     OA- bilat knees    Bladder absent     stoma present    Bladder cancer (HCC)     CAD (coronary artery disease)     CABG x 4 in 2004 - no damage- emergenc CABG- Cath 1/2011- total occlusion ramus, total mid occlusion left anterior descending, 50-70% stenosis of mid right coronary artery- \"Normal LEF\"    Cancer ([facility] [address] 1/1/2008    bladder- chemo in bladder then 7/11 bladder removed    CRI (chronic renal insufficiency), stage 3 (moderate) (HCC)     Depression     Family history of diabetes mellitus     Heart murmur     mild mitral regurgitation    Hypercholesteremia     Hypertension     Infection     to s/p hernia wound 9/6010    Other complication of colostomy or enterostomy     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

## 2021-10-12 NOTE — PROGRESS NOTES
Recovery period without difficulty. Pt alert and oriented and denies pain. Dressing is clean, dry, and intact. Reviewed discharge instructions with patient, verbalized understanding. Pt escorted to lobby discharge area via wheelchair.

## 2021-10-12 NOTE — DISCHARGE INSTRUCTIONS
[address]  Department of Interventional Radiology  [facility]  [phone] Office  [phone] Fax    Nephrostomy Tube Discharge Instructions    General Information:   A nephrostomy is a tube inserted through your skin and into the kidney. The purpose of the tube is to drain urine outside of the body. This is done in the event fo a block or a damaged ureter. Most of these tubes are usually changed every 2-3 months. However, some patients may need to have their tubes changed more often. Home Care Instructions: You can resume your regular diet. Do not drink alcohol, drive, or make any important legal decisions in the next 24 hours. Do not lift anything heavier than a gallon of milk or do anything strenuous for the next 24 hours. You should not shower for 24 hours. You should not bathe or swim while you have this drain in place. Your doctor may arrange for home health to visit you to help take care of the tube. You should clean the tube and change the dressing at least every 48 hours and more as needed. Keep the dressing clean and dry. Keep up with how much drainage you get from the tube and report this to your doctor. Call If:   You should call your Physician and/or the Radiology Nurse if you have any bleeding other than a small spot on your bandage. Call if you have any signs of infection, fever, or increased pain at the site of the tube. Call if you should have leakage around the tube, a change in the appearance of the urine draining from the tube, increased pain in the lower back, or no drainage from the tube for 12 hours. Call if the tube has pulled back, or has been pulled out. Follow-Up Instructions:  Please see your ordering doctor as he/she has requested. To Reach Us: If you have any questions about your procedure, please call the Interventional Radiology department at [phone].  After business hours (5pm) and weekends, call the answering service at [phone] and ask for the Radiologist on call to be paged. Si tiene Preguntas acerca del procedimiento, por favor llame al departamento de Radiología Intervencional al [phone]. Después de horas de oficina (5 pm) y los fines de [place], llamar al Toñito Delfino Ivonne al [phone] y pregunte por el Radiologo de Simi Cruz. Interventional Radiology General Nurse Discharge    After general anesthesia or intravenous sedation, for 24 hours or while taking prescription Narcotics:  · Limit your activities  · Do not drive and operate hazardous machinery  · Do not make important personal or business decisions  · Do  not drink alcoholic beverages  · If you have not urinated within 8 hours after discharge, please contact your surgeon on call. * Please give a list of your current medications to your Primary Care Provider. * Please update this list whenever your medications are discontinued, doses are     changed, or new medications (including over-the-counter products) are added. * Please carry medication information at all times in case of emergency situations. These are general instructions for a healthy lifestyle:    No smoking/ No tobacco products/ Avoid exposure to second hand smoke  Surgeon General's Warning:  Quitting smoking now greatly reduces serious risk to your health. Obesity, smoking, and sedentary lifestyle greatly increases your risk for illness  A healthy diet, regular physical exercise & weight monitoring are important for maintaining a healthy lifestyle    You may be retaining fluid if you have a history of heart failure or if you experience any of the following symptoms:  Weight gain of 3 pounds or more overnight or 5 pounds in a week, increased swelling in our hands or feet or shortness of breath while lying flat in bed.   Please call your doctor as soon as you notice any of these symptoms; do not wait until your next office visit. Recognize signs and symptoms of STROKE:  F-face looks uneven    A-arms unable to move or move unevenly    S-speech slurred or non-existent    T-time-call 911 as soon as signs and symptoms begin-DO NOT go       Back to bed or wait to see if you get better-TIME IS BRAIN.

## 2021-10-13 PROCEDURE — 3331090001 HH PPS REVENUE CREDIT

## 2021-10-13 PROCEDURE — 3331090002 HH PPS REVENUE DEBIT

## 2021-10-14 ENCOUNTER — HOME CARE VISIT (OUTPATIENT)
Dept: SCHEDULING | Facility: HOME HEALTH | Age: 81
End: 2021-10-14
Payer: MEDICARE

## 2021-10-14 VITALS
RESPIRATION RATE: 18 BRPM | HEART RATE: 64 BPM | DIASTOLIC BLOOD PRESSURE: 82 MMHG | SYSTOLIC BLOOD PRESSURE: 143 MMHG | OXYGEN SATURATION: 96 % | TEMPERATURE: 98.3 F

## 2021-10-14 PROCEDURE — G0299 HHS/HOSPICE OF RN EA 15 MIN: HCPCS

## 2021-10-14 PROCEDURE — 3331090002 HH PPS REVENUE DEBIT

## 2021-10-14 PROCEDURE — 3331090001 HH PPS REVENUE CREDIT

## 2021-10-15 PROCEDURE — 3331090002 HH PPS REVENUE DEBIT

## 2021-10-15 PROCEDURE — 3331090001 HH PPS REVENUE CREDIT

## 2021-10-16 PROCEDURE — 3331090001 HH PPS REVENUE CREDIT

## 2021-10-16 PROCEDURE — 3331090002 HH PPS REVENUE DEBIT

## 2021-10-17 PROCEDURE — 3331090001 HH PPS REVENUE CREDIT

## 2021-10-17 PROCEDURE — 3331090002 HH PPS REVENUE DEBIT

## 2021-10-18 ENCOUNTER — HOME CARE VISIT (OUTPATIENT)
Dept: SCHEDULING | Facility: HOME HEALTH | Age: 81
End: 2021-10-18
Payer: MEDICARE

## 2021-10-18 VITALS
RESPIRATION RATE: 18 BRPM | DIASTOLIC BLOOD PRESSURE: 84 MMHG | OXYGEN SATURATION: 99 % | TEMPERATURE: 98.3 F | SYSTOLIC BLOOD PRESSURE: 136 MMHG | HEART RATE: 60 BPM

## 2021-10-18 PROCEDURE — G0299 HHS/HOSPICE OF RN EA 15 MIN: HCPCS

## 2021-10-18 PROCEDURE — 3331090002 HH PPS REVENUE DEBIT

## 2021-10-18 PROCEDURE — 3331090001 HH PPS REVENUE CREDIT

## 2021-10-19 PROCEDURE — 3331090002 HH PPS REVENUE DEBIT

## 2021-10-19 PROCEDURE — 3331090001 HH PPS REVENUE CREDIT

## 2021-10-20 PROCEDURE — 3331090001 HH PPS REVENUE CREDIT

## 2021-10-20 PROCEDURE — 3331090002 HH PPS REVENUE DEBIT

## 2021-10-21 ENCOUNTER — HOME CARE VISIT (OUTPATIENT)
Dept: SCHEDULING | Facility: HOME HEALTH | Age: 81
End: 2021-10-21
Payer: MEDICARE

## 2021-10-21 VITALS
TEMPERATURE: 97.7 F | HEART RATE: 76 BPM | OXYGEN SATURATION: 97 % | DIASTOLIC BLOOD PRESSURE: 74 MMHG | SYSTOLIC BLOOD PRESSURE: 116 MMHG | RESPIRATION RATE: 18 BRPM

## 2021-10-21 PROCEDURE — 400018 HH-NO PAY CLAIM PROCEDURE

## 2021-10-21 PROCEDURE — 3331090002 HH PPS REVENUE DEBIT

## 2021-10-21 PROCEDURE — 400014 HH F/U

## 2021-10-21 PROCEDURE — G0299 HHS/HOSPICE OF RN EA 15 MIN: HCPCS

## 2021-10-21 PROCEDURE — 3331090001 HH PPS REVENUE CREDIT

## 2021-10-22 PROCEDURE — 3331090002 HH PPS REVENUE DEBIT

## 2021-10-22 PROCEDURE — 3331090001 HH PPS REVENUE CREDIT

## 2021-10-23 PROCEDURE — 3331090002 HH PPS REVENUE DEBIT

## 2021-10-23 PROCEDURE — 3331090001 HH PPS REVENUE CREDIT

## 2021-10-24 PROCEDURE — 3331090001 HH PPS REVENUE CREDIT

## 2021-10-24 PROCEDURE — 3331090002 HH PPS REVENUE DEBIT

## 2021-10-25 ENCOUNTER — HOME CARE VISIT (OUTPATIENT)
Dept: SCHEDULING | Facility: HOME HEALTH | Age: 81
End: 2021-10-25
Payer: MEDICARE

## 2021-10-25 VITALS
RESPIRATION RATE: 15 BRPM | SYSTOLIC BLOOD PRESSURE: 124 MMHG | TEMPERATURE: 98.6 F | DIASTOLIC BLOOD PRESSURE: 80 MMHG | HEART RATE: 67 BPM | OXYGEN SATURATION: 98 %

## 2021-10-25 PROCEDURE — 3331090002 HH PPS REVENUE DEBIT

## 2021-10-25 PROCEDURE — 3331090001 HH PPS REVENUE CREDIT

## 2021-10-25 PROCEDURE — G0299 HHS/HOSPICE OF RN EA 15 MIN: HCPCS

## 2021-10-26 ENCOUNTER — HOME CARE VISIT (OUTPATIENT)
Dept: SCHEDULING | Facility: HOME HEALTH | Age: 81
End: 2021-10-26
Payer: MEDICARE

## 2021-10-26 VITALS
RESPIRATION RATE: 16 BRPM | HEART RATE: 64 BPM | SYSTOLIC BLOOD PRESSURE: 150 MMHG | DIASTOLIC BLOOD PRESSURE: 94 MMHG | TEMPERATURE: 98.7 F | OXYGEN SATURATION: 97 %

## 2021-10-26 PROCEDURE — 3331090001 HH PPS REVENUE CREDIT

## 2021-10-26 PROCEDURE — 3331090002 HH PPS REVENUE DEBIT

## 2021-10-26 PROCEDURE — G0299 HHS/HOSPICE OF RN EA 15 MIN: HCPCS

## 2021-10-27 PROCEDURE — 3331090002 HH PPS REVENUE DEBIT

## 2021-10-27 PROCEDURE — 3331090001 HH PPS REVENUE CREDIT

## 2021-10-28 ENCOUNTER — HOME CARE VISIT (OUTPATIENT)
Dept: SCHEDULING | Facility: HOME HEALTH | Age: 81
End: 2021-10-28
Payer: MEDICARE

## 2021-10-28 VITALS
RESPIRATION RATE: 15 BRPM | TEMPERATURE: 97.8 F | DIASTOLIC BLOOD PRESSURE: 74 MMHG | SYSTOLIC BLOOD PRESSURE: 130 MMHG | OXYGEN SATURATION: 98 % | HEART RATE: 58 BPM

## 2021-10-28 PROCEDURE — 3331090001 HH PPS REVENUE CREDIT

## 2021-10-28 PROCEDURE — G0299 HHS/HOSPICE OF RN EA 15 MIN: HCPCS

## 2021-10-28 PROCEDURE — 3331090002 HH PPS REVENUE DEBIT

## 2021-10-29 PROCEDURE — 3331090001 HH PPS REVENUE CREDIT

## 2021-10-29 PROCEDURE — 3331090002 HH PPS REVENUE DEBIT

## 2021-10-30 PROCEDURE — 3331090002 HH PPS REVENUE DEBIT

## 2021-10-30 PROCEDURE — 3331090001 HH PPS REVENUE CREDIT

## 2021-10-31 PROCEDURE — 3331090002 HH PPS REVENUE DEBIT

## 2021-10-31 PROCEDURE — 3331090001 HH PPS REVENUE CREDIT

## 2021-11-01 ENCOUNTER — HOME CARE VISIT (OUTPATIENT)
Dept: SCHEDULING | Facility: HOME HEALTH | Age: 81
End: 2021-11-01
Payer: MEDICARE

## 2021-11-01 VITALS
RESPIRATION RATE: 16 BRPM | TEMPERATURE: 98.2 F | SYSTOLIC BLOOD PRESSURE: 120 MMHG | OXYGEN SATURATION: 99 % | HEART RATE: 55 BPM | DIASTOLIC BLOOD PRESSURE: 70 MMHG

## 2021-11-01 PROCEDURE — G0299 HHS/HOSPICE OF RN EA 15 MIN: HCPCS

## 2021-11-01 PROCEDURE — 3331090002 HH PPS REVENUE DEBIT

## 2021-11-01 PROCEDURE — 3331090001 HH PPS REVENUE CREDIT

## 2021-11-02 PROCEDURE — 3331090001 HH PPS REVENUE CREDIT

## 2021-11-02 PROCEDURE — 3331090002 HH PPS REVENUE DEBIT

## 2021-11-03 PROCEDURE — 3331090002 HH PPS REVENUE DEBIT

## 2021-11-03 PROCEDURE — 3331090001 HH PPS REVENUE CREDIT

## 2021-11-04 ENCOUNTER — HOME CARE VISIT (OUTPATIENT)
Dept: SCHEDULING | Facility: HOME HEALTH | Age: 81
End: 2021-11-04
Payer: MEDICARE

## 2021-11-04 VITALS
DIASTOLIC BLOOD PRESSURE: 70 MMHG | TEMPERATURE: 97.8 F | OXYGEN SATURATION: 95 % | HEART RATE: 82 BPM | SYSTOLIC BLOOD PRESSURE: 115 MMHG | RESPIRATION RATE: 18 BRPM

## 2021-11-04 PROCEDURE — 3331090001 HH PPS REVENUE CREDIT

## 2021-11-04 PROCEDURE — G0299 HHS/HOSPICE OF RN EA 15 MIN: HCPCS

## 2021-11-04 PROCEDURE — 3331090002 HH PPS REVENUE DEBIT

## 2021-11-05 PROCEDURE — 3331090001 HH PPS REVENUE CREDIT

## 2021-11-05 PROCEDURE — 3331090002 HH PPS REVENUE DEBIT

## 2021-11-06 PROCEDURE — 3331090001 HH PPS REVENUE CREDIT

## 2021-11-06 PROCEDURE — 3331090002 HH PPS REVENUE DEBIT

## 2021-11-07 PROCEDURE — 3331090001 HH PPS REVENUE CREDIT

## 2021-11-07 PROCEDURE — 3331090002 HH PPS REVENUE DEBIT

## 2021-11-08 ENCOUNTER — HOME CARE VISIT (OUTPATIENT)
Dept: SCHEDULING | Facility: HOME HEALTH | Age: 81
End: 2021-11-08
Payer: MEDICARE

## 2021-11-08 VITALS
TEMPERATURE: 98.6 F | RESPIRATION RATE: 16 BRPM | HEART RATE: 104 BPM | DIASTOLIC BLOOD PRESSURE: 60 MMHG | SYSTOLIC BLOOD PRESSURE: 112 MMHG | OXYGEN SATURATION: 99 %

## 2021-11-08 PROCEDURE — 3331090002 HH PPS REVENUE DEBIT

## 2021-11-08 PROCEDURE — 3331090001 HH PPS REVENUE CREDIT

## 2021-11-08 PROCEDURE — G0299 HHS/HOSPICE OF RN EA 15 MIN: HCPCS

## 2021-11-09 PROCEDURE — 3331090002 HH PPS REVENUE DEBIT

## 2021-11-09 PROCEDURE — 3331090001 HH PPS REVENUE CREDIT

## 2021-11-10 PROCEDURE — 3331090002 HH PPS REVENUE DEBIT

## 2021-11-10 PROCEDURE — 3331090001 HH PPS REVENUE CREDIT

## 2021-11-11 ENCOUNTER — HOME CARE VISIT (OUTPATIENT)
Dept: SCHEDULING | Facility: HOME HEALTH | Age: 81
End: 2021-11-11
Payer: MEDICARE

## 2021-11-11 VITALS
DIASTOLIC BLOOD PRESSURE: 72 MMHG | SYSTOLIC BLOOD PRESSURE: 136 MMHG | TEMPERATURE: 98.2 F | RESPIRATION RATE: 18 BRPM | HEART RATE: 88 BPM | OXYGEN SATURATION: 98 %

## 2021-11-11 PROCEDURE — G0299 HHS/HOSPICE OF RN EA 15 MIN: HCPCS

## 2021-11-11 PROCEDURE — 3331090001 HH PPS REVENUE CREDIT

## 2021-11-11 PROCEDURE — 3331090002 HH PPS REVENUE DEBIT

## 2021-11-12 PROCEDURE — 3331090001 HH PPS REVENUE CREDIT

## 2021-11-12 PROCEDURE — 3331090002 HH PPS REVENUE DEBIT

## 2021-11-13 PROCEDURE — 3331090001 HH PPS REVENUE CREDIT

## 2021-11-13 PROCEDURE — 3331090002 HH PPS REVENUE DEBIT

## 2021-11-14 PROCEDURE — 3331090001 HH PPS REVENUE CREDIT

## 2021-11-14 PROCEDURE — 3331090002 HH PPS REVENUE DEBIT

## 2021-11-15 PROCEDURE — 3331090001 HH PPS REVENUE CREDIT

## 2021-11-15 PROCEDURE — 3331090002 HH PPS REVENUE DEBIT

## 2021-11-16 ENCOUNTER — HOME CARE VISIT (OUTPATIENT)
Dept: HOME HEALTH SERVICES | Facility: HOME HEALTH | Age: 81
End: 2021-11-16
Payer: MEDICARE

## 2021-11-16 PROCEDURE — 3331090001 HH PPS REVENUE CREDIT

## 2021-11-16 PROCEDURE — 3331090002 HH PPS REVENUE DEBIT

## 2021-11-16 PROCEDURE — G0299 HHS/HOSPICE OF RN EA 15 MIN: HCPCS

## 2021-11-17 ENCOUNTER — HOME CARE VISIT (OUTPATIENT)
Dept: SCHEDULING | Facility: HOME HEALTH | Age: 81
End: 2021-11-17
Payer: MEDICARE

## 2021-11-17 VITALS
TEMPERATURE: 99 F | SYSTOLIC BLOOD PRESSURE: 112 MMHG | RESPIRATION RATE: 16 BRPM | DIASTOLIC BLOOD PRESSURE: 68 MMHG | OXYGEN SATURATION: 99 % | HEART RATE: 55 BPM

## 2021-11-17 PROCEDURE — 3331090001 HH PPS REVENUE CREDIT

## 2021-11-17 PROCEDURE — 3331090002 HH PPS REVENUE DEBIT

## 2021-11-17 PROCEDURE — G0299 HHS/HOSPICE OF RN EA 15 MIN: HCPCS

## 2021-11-18 PROCEDURE — 3331090002 HH PPS REVENUE DEBIT

## 2021-11-18 PROCEDURE — 3331090001 HH PPS REVENUE CREDIT

## 2021-11-19 PROCEDURE — 3331090001 HH PPS REVENUE CREDIT

## 2021-11-19 PROCEDURE — 3331090002 HH PPS REVENUE DEBIT

## 2021-11-20 PROCEDURE — 400018 HH-NO PAY CLAIM PROCEDURE

## 2021-11-20 PROCEDURE — 3331090002 HH PPS REVENUE DEBIT

## 2021-11-20 PROCEDURE — 3331090001 HH PPS REVENUE CREDIT

## 2021-11-21 VITALS
SYSTOLIC BLOOD PRESSURE: 132 MMHG | TEMPERATURE: 97.9 F | HEART RATE: 71 BPM | DIASTOLIC BLOOD PRESSURE: 76 MMHG | OXYGEN SATURATION: 98 % | RESPIRATION RATE: 17 BRPM

## 2021-11-21 PROCEDURE — 3331090001 HH PPS REVENUE CREDIT

## 2021-11-21 PROCEDURE — 3331090002 HH PPS REVENUE DEBIT

## 2021-11-22 ENCOUNTER — HOSPITAL ENCOUNTER (OUTPATIENT)
Dept: INTERVENTIONAL RADIOLOGY/VASCULAR | Age: 81
Discharge: HOME | End: 2021-11-22
Attending: RADIOLOGY
Payer: MEDICARE

## 2021-11-22 VITALS
TEMPERATURE: 98.9 F | WEIGHT: 165 LBS | OXYGEN SATURATION: 98 % | RESPIRATION RATE: 16 BRPM | BODY MASS INDEX: 23.62 KG/M2 | HEART RATE: 65 BPM | DIASTOLIC BLOOD PRESSURE: 66 MMHG | HEIGHT: 70 IN | SYSTOLIC BLOOD PRESSURE: 126 MMHG

## 2021-11-22 DIAGNOSIS — N13.5 URETERAL OBSTRUCTION: ICD-10-CM

## 2021-11-22 PROCEDURE — 77030025702 HC BG URIN DRN MRTM -A

## 2021-11-22 PROCEDURE — C2625 STENT, NON-COR, TEM W/DEL SY: HCPCS

## 2021-11-22 PROCEDURE — 3331090001 HH PPS REVENUE CREDIT

## 2021-11-22 PROCEDURE — 3331090002 HH PPS REVENUE DEBIT

## 2021-11-22 PROCEDURE — 49424 ASSESS CYST CONTRAST INJECT: CPT

## 2021-11-22 PROCEDURE — 74011000250 HC RX REV CODE- 250: Performed by: RADIOLOGY

## 2021-11-22 PROCEDURE — 77030002916 HC SUT ETHLN J&J -A

## 2021-11-22 PROCEDURE — C1769 GUIDE WIRE: HCPCS

## 2021-11-22 PROCEDURE — 74011000636 HC RX REV CODE- 636: Performed by: RADIOLOGY

## 2021-11-22 PROCEDURE — 74011250636 HC RX REV CODE- 250/636: Performed by: RADIOLOGY

## 2021-11-22 RX ORDER — SODIUM CHLORIDE 9 MG/ML
25 INJECTION, SOLUTION INTRAVENOUS ONCE
Status: COMPLETED | OUTPATIENT
Start: 2021-11-22 | End: 2021-11-22

## 2021-11-22 RX ORDER — MIDAZOLAM HYDROCHLORIDE 1 MG/ML
.5-2 INJECTION, SOLUTION INTRAMUSCULAR; INTRAVENOUS
Status: DISCONTINUED | OUTPATIENT
Start: 2021-11-22 | End: 2021-11-22

## 2021-11-22 RX ORDER — FENTANYL CITRATE 50 UG/ML
25-100 INJECTION, SOLUTION INTRAMUSCULAR; INTRAVENOUS
Status: DISCONTINUED | OUTPATIENT
Start: 2021-11-22 | End: 2021-11-22

## 2021-11-22 RX ORDER — LIDOCAINE HYDROCHLORIDE 20 MG/ML
20-200 INJECTION, SOLUTION INFILTRATION; PERINEURAL
Status: DISCONTINUED | OUTPATIENT
Start: 2021-11-22 | End: 2021-11-22

## 2021-11-22 RX ADMIN — MIDAZOLAM 1 MG: 1 INJECTION INTRAMUSCULAR; INTRAVENOUS at 09:21

## 2021-11-22 RX ADMIN — MIDAZOLAM 0.5 MG: 1 INJECTION INTRAMUSCULAR; INTRAVENOUS at 09:27

## 2021-11-22 RX ADMIN — FENTANYL CITRATE 25 MCG: 0.05 INJECTION, SOLUTION INTRAMUSCULAR; INTRAVENOUS at 09:27

## 2021-11-22 RX ADMIN — SODIUM CHLORIDE 25 ML/HR: 900 INJECTION, SOLUTION INTRAVENOUS at 09:20

## 2021-11-22 RX ADMIN — IOPAMIDOL 10 ML: 612 INJECTION, SOLUTION INTRAVENOUS at 09:33

## 2021-11-22 RX ADMIN — FENTANYL CITRATE 50 MCG: 0.05 INJECTION, SOLUTION INTRAMUSCULAR; INTRAVENOUS at 09:21

## 2021-11-22 RX ADMIN — LIDOCAINE HYDROCHLORIDE 200 MG: 20 INJECTION, SOLUTION INFILTRATION; PERINEURAL at 09:32

## 2021-11-22 NOTE — H&P
Department of Interventional Radiology  [phone]    History and Physical    Patient:  Phillip Earl MRN:  [id number]  SSN:  [id number]    YOB: 1940  Age:  80 y.o. Sex:  male      Primary Care Provider:  Kary Wallis MD  Referring Physician:  Braden Dawson MD    Subjective:     Chief Complaint: ureteral obstruction    History of the Present Illness: The patient is a 80 y.o. male with hx bladder cancer, ureteral obstruction, urostomy and a left nephroureteral drain. He presents today for drain exchange, possible retrograde. NPO. No pain or fevers. He does report fair amount of sediment in his ostomy bag. Twice in the last month his home nurse opening the drain due to flank pressure with return of urine under high pressure.        Past Medical History:   Diagnosis Date    Acute encephalopathy 6/18/2021    Arthritis     OA- bilat knees    Bladder absent     stoma present    Bladder cancer (HCC)     CAD (coronary artery disease)     CABG x 4 in 2004 - no damage- emergenc CABG- Cath 1/2011- total occlusion ramus, total mid occlusion left anterior descending, 50-70% stenosis of mid right coronary artery- \"Normal LEF\"    Cancer ([facility] [address] 1/1/2008    bladder- chemo in bladder then 7/11 bladder removed    CRI (chronic renal insufficiency), stage 3 (moderate) (HCC)     Depression     Family history of diabetes mellitus     Heart murmur     mild mitral regurgitation    Hypercholesteremia     Hypertension     Infection     to s/p hernia wound 2/2142    Other complication of colostomy or enterostomy     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    Followed by surgeon Dr Diana Rg intestinal obstruction     Urinary tract infection 6/18/2021    Ventral hernia, unspecified, without mention of obstruction or gangrene      Past Surgical History:   Procedure Laterality Date    HX COLONOSCOPY  1/07    repeat 10 years    HX HEART CATHETERIZATION  2011    HX HERNIA REPAIR  8/26/11    hernia repair    HX HERNIA REPAIR  9/2011    9 days later emergency s/p hernia repair surg    HX VASECTOMY  1974    IR CHANGE NEPHROSTOMY  PYELOS TUBE  10/12/2021    IR CONVERT NEPHRO PERC LT TO NEPHROURETERAL CATH EXISTING SI  8/3/2021    IR EXCHANGE NEPHRO PERC LT SI  8/6/2021    IR NEPHROSTOMY PERC LT PLC CATH  SI  6/21/2021    [place] CARDIAC SURG PROCEDURE UNLIST  2004    CABG x 4    [place] REMV BLADDER,TOTAL  7/2008    ostomy        Review of Systems:    Pertinent items are noted in the History of Present Illness. Prior to Admission medications    Medication Sig Start Date End Date Taking? Authorizing Provider   losartan (COZAAR) 100 mg tablet Take 1 Tablet by mouth daily. 9/28/21  Yes Angie Turk MD   simvastatin (ZOCOR) 80 mg tablet TAKE 1 TABLET AT BEDTIME 9/28/21  Yes Angie Turk MD   FLUoxetine (PROzac) 20 mg capsule Take 1 Capsule by mouth daily. 9/7/21  Yes Liane Yung MD   acetaminophen (TYLENOL) 500 mg tablet Take 500 mg by mouth every six (6) hours as needed for Pain. Take 1-2 tablets every 6 hrs as needed   Yes Provider, Historical   aspirin delayed-release 81 mg tablet Take 1 Tab by mouth daily.  Indications: MYOCARDIAL INFARCTION PREVENTION 6/6/13  Yes Mario Gallardo MD        No Known Allergies    Family History   Problem Relation Age of Onset    Heart Disease Father 37        MI    Hypertension Father     Heart Disease Sister     Arthritis-osteo Sister     Cancer Mother         OVARY    Diabetes Sister     Hypertension Sister      Social History     Tobacco Use    Smoking status: Former Smoker    Smokeless tobacco: Never Used    Tobacco comment: smoked when in [employer]   Substance Use Topics    Alcohol use: No     Comment: rarely have glass of wine maybe one per month        Objective:       Physical Examination:    Vitals:    11/22/21 0817   BP: 135/71   Pulse: 65   Resp: 18   Temp: 98.9 °F (37.2 °C)   SpO2: 99%   Weight: 74.8 kg (165 lb)   Height: 5' 10\" (1.778 m)       Pain Assessment           Patient Stated Pain Goal: 0      HEART: regular rate and rhythm  LUNG: clear to auscultation bilaterally  ABDOMEN: soft, nontender  EXTREMITIES: warm, no edema    Laboratory:     Lab Results   Component Value Date/Time    Sodium 139 09/07/2021 10:23 AM    Sodium 139 08/05/2021 03:30 PM    Potassium 4.2 09/07/2021 10:23 AM    Potassium 4.3 08/05/2021 03:30 PM    Chloride 104 09/07/2021 10:23 AM    Chloride 107 08/05/2021 03:30 PM    CO2 22 09/07/2021 10:23 AM    CO2 27 08/05/2021 03:30 PM    Anion gap 5 (L) 08/05/2021 03:30 PM    Anion gap 5 (L) 08/05/2021 09:54 AM    Glucose 104 (H) 09/07/2021 10:23 AM    Glucose 108 (H) 08/05/2021 03:30 PM    BUN 20 09/07/2021 10:23 AM    BUN 15 08/05/2021 03:30 PM    Creatinine 1.38 (H) 09/07/2021 10:23 AM    Creatinine 1.33 08/05/2021 03:30 PM    GFR est AA 56 (L) 09/07/2021 10:23 AM    GFR est AA >60 08/05/2021 03:30 PM    GFR est non-AA 48 (L) 09/07/2021 10:23 AM    GFR est non-AA 55 (L) 08/05/2021 03:30 PM    Calcium 9.0 09/07/2021 10:23 AM    Calcium 9.2 08/05/2021 03:30 PM    Magnesium 1.8 06/19/2021 06:04 AM    Magnesium 2.2 09/02/2011 05:23 AM    Phosphorus 3.1 09/02/2011 05:23 AM    Phosphorus 7.9 (H) 08/18/2008 08:40 AM    Albumin 3.9 09/07/2021 10:23 AM    Albumin 4.0 07/02/2021 09:53 AM    Protein, total 7.4 09/07/2021 10:23 AM    Protein, total 6.9 07/02/2021 09:53 AM    Globulin 3.4 06/19/2021 06:04 AM    Globulin 3.9 (H) 06/18/2021 01:55 PM    A-G Ratio 0.8 (L) 06/19/2021 06:04 AM    A-G Ratio 0.9 (L) 06/18/2021 01:55 PM    ALT (SGPT) 15 09/07/2021 10:23 AM    ALT (SGPT) 19 07/02/2021 09:53 AM     Lab Results   Component Value Date/Time    WBC 7.6 09/07/2021 10:23 AM    WBC 6.2 08/05/2021 09:54 AM    HGB 11.9 (L) 09/07/2021 10:23 AM    HGB 10.3 (L) 08/05/2021 09:54 AM    HCT 34.8 (L) 09/07/2021 10:23 AM    HCT 32.6 (L) 08/05/2021 09:54 AM    PLATELET 873 78/79/7896 10:23 AM    PLATELET 314 (L) 92/06/3191 09:54 AM     Lab Results   Component Value Date/Time    aPTT 34.6 06/19/2021 04:08 PM    Prothrombin time 14.7 06/19/2021 04:08 PM    INR 1.1 06/19/2021 04:08 PM       Assessment:     Ureteral obstruction    Hospital Problems  Date Reviewed: 9/7/2021    None          Plan:     Planned Procedure:  Nephrostogram, drain exchange, possible retrograde    Risks, benefits, and alternatives reviewed with patient and he agrees to proceed with the procedure.       Signed By: Charlie Majano PA-C     November 22, 2021

## 2021-11-22 NOTE — PROCEDURES
Department of Interventional Radiology  [phone]        Interventional Radiology Brief Procedure Note    Patient: Tracey Ang MRN: [id number]  SSN: [id number]    YOB: 1940  Age: 80 y.o.   Sex: male      Date of Procedure: 11/22/2021    Pre-Procedure Diagnosis: ureteral obstruction    Post-Procedure Diagnosis: SAME    Procedure(s): Percutaneous Nephrostomy Tube Exchange    Brief Description of Procedure: NUS exchange    Performed By: Timothy Jane MD     Assistants: None    Anesthesia:Moderate Sedation    Estimated Blood Loss: None    Specimens:  None    Implants:  Nephro-Ureteral Drain    Findings: left NUS exchanged over a wire    Complications: None    Recommendations: cap to internally drain     Follow Up: 3 months and after urology appointment    Signed By: Timothy Jane MD     November 22, 2021

## 2021-11-22 NOTE — DISCHARGE INSTRUCTIONS
[address]  Department of Interventional Radiology  [facility]  [phone] Office  [phone] Fax    Nephrostomy Tube Discharge Instructions    General Information:   A nephrostomy is a tube inserted through your skin and into the kidney. The purpose of the tube is to drain urine outside of the body. This is done in the event fo a block or a damaged ureter. Most of these tubes are usually changed every 2-3 months. However, some patients may need to have their tubes changed more often. Home Care Instructions: You can resume your regular diet. Do not drink alcohol, drive, or make any important legal decisions in the next 24 hours. Do not lift anything heavier than a gallon of milk or do anything strenuous for the next 24 hours. You should not shower for 24 hours. You should not bathe or swim while you have this drain in place. Your doctor may arrange for home health to visit you to help take care of the tube. You should clean the tube and change the dressing at least every 48 hours and more as needed. Keep the dressing clean and dry. Keep up with how much drainage you get from the tube and report this to your doctor. Call If:   You should call your Physician and/or the Radiology Nurse if you have any bleeding other than a small spot on your bandage. Call if you have any signs of infection, fever, or increased pain at the site of the tube. Call if you should have leakage around the tube, a change in the appearance of the urine draining from the tube, increased pain in the lower back, or no drainage from the tube for 12 hours. Call if the tube has pulled back, or has been pulled out. Follow-Up Instructions:  Please see your ordering doctor as he/she has requested. To Reach Us: If you have any questions about your procedure, please call the Interventional Radiology department at [phone].  After business hours (5pm) and weekends, call the answering service at [phone] and ask for the Radiologist on call to be paged. Si tiene Preguntas acerca del procedimiento, por favor llame al departamento de Radiología Intervencional al [phone]. Después de horas de oficina (5 pm) y los fines de [place], llamar al Teena Coronado al [phone] y pregunte por el Radiologo de Simi Cruz. Interventional Radiology General Nurse Discharge    After general anesthesia or intravenous sedation, for 24 hours or while taking prescription Narcotics:  · Limit your activities  · Do not drive and operate hazardous machinery  · Do not make important personal or business decisions  · Do  not drink alcoholic beverages  · If you have not urinated within 8 hours after discharge, please contact your surgeon on call. * Please give a list of your current medications to your Primary Care Provider. * Please update this list whenever your medications are discontinued, doses are     changed, or new medications (including over-the-counter products) are added. * Please carry medication information at all times in case of emergency situations. These are general instructions for a healthy lifestyle:    No smoking/ No tobacco products/ Avoid exposure to second hand smoke  Surgeon General's Warning:  Quitting smoking now greatly reduces serious risk to your health. Obesity, smoking, and sedentary lifestyle greatly increases your risk for illness  A healthy diet, regular physical exercise & weight monitoring are important for maintaining a healthy lifestyle    You may be retaining fluid if you have a history of heart failure or if you experience any of the following symptoms:  Weight gain of 3 pounds or more overnight or 5 pounds in a week, increased swelling in our hands or feet or shortness of breath while lying flat in bed.   Please call your doctor as soon as you notice any of these symptoms; do not wait until your next office visit. Recognize signs and symptoms of STROKE:  F-face looks uneven    A-arms unable to move or move unevenly    S-speech slurred or non-existent    T-time-call 911 as soon as signs and symptoms begin-DO NOT go       Back to bed or wait to see if you get better-TIME IS BRAIN.

## 2021-11-22 NOTE — PROGRESS NOTES
Pt to IR [address] via stretcher with RN.       IR Nurse Pre-Procedure Checklist Part 2          Consent signed: Yes    H&P complete:  Yes    Antibiotics: Not applicable    Airway/Mallampati Done: Yes    Shaved: Not applicable    Pregnancy Form:Not applicable    Patient Position: Yes    MD Side: Yes     Biopsy Worksheet: Not applicable    Specimen Medium: Not applicable

## 2021-11-22 NOTE — PROGRESS NOTES
TRANSFER - OUT REPORT:    Verbal report given to [address] on Milady Huerta  being transferred to IR room 1 for routine post - op       Report consisted of patients Situation, Background, Assessment and   Recommendations(SBAR). Information from the following report(s) SBAR, Procedure Summary and MAR was reviewed with the receiving nurse. Opportunity for questions and clarification was provided. Conscious Sedation:   75 Mcg of Fentanyl administered  1.5 Mg of Versed administered    Pt tolerated procedure well.      Visit Vitals  /70   Pulse 71   Temp 98.9 °F (37.2 °C)   Resp 16   Ht 5' 10\" (1.778 m)   Wt 74.8 kg (165 lb)   SpO2 98%   BMI 23.68 kg/m²     Past Medical History:   Diagnosis Date    Acute encephalopathy 6/18/2021    Arthritis     OA- bilat knees    Bladder absent     stoma present    Bladder cancer (HCC)     CAD (coronary artery disease)     CABG x 4 in 2004 - no damage- emergenc CABG- Cath 1/2011- total occlusion ramus, total mid occlusion left anterior descending, 50-70% stenosis of mid right coronary artery- \"Normal LEF\"    Cancer ([facility] [address] 1/1/2008    bladder- chemo in bladder then 7/11 bladder removed    CRI (chronic renal insufficiency), stage 3 (moderate) (HCC)     Depression     Family history of diabetes mellitus     Heart murmur     mild mitral regurgitation    Hypercholesteremia     Hypertension     Infection     to s/p hernia wound 9/6520    Other complication of colostomy or enterostomy     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

## 2021-11-23 ENCOUNTER — HOME CARE VISIT (OUTPATIENT)
Dept: SCHEDULING | Facility: HOME HEALTH | Age: 81
End: 2021-11-23
Payer: MEDICARE

## 2021-11-23 VITALS
SYSTOLIC BLOOD PRESSURE: 116 MMHG | OXYGEN SATURATION: 98 % | RESPIRATION RATE: 16 BRPM | HEART RATE: 61 BPM | DIASTOLIC BLOOD PRESSURE: 60 MMHG | TEMPERATURE: 97.3 F

## 2021-11-23 PROCEDURE — G0299 HHS/HOSPICE OF RN EA 15 MIN: HCPCS

## 2021-11-23 PROCEDURE — 3331090002 HH PPS REVENUE DEBIT

## 2021-11-23 PROCEDURE — 400014 HH F/U

## 2021-11-23 PROCEDURE — 3331090001 HH PPS REVENUE CREDIT

## 2021-11-24 PROCEDURE — 3331090002 HH PPS REVENUE DEBIT

## 2021-11-24 PROCEDURE — 3331090001 HH PPS REVENUE CREDIT

## 2021-11-25 PROCEDURE — 3331090002 HH PPS REVENUE DEBIT

## 2021-11-25 PROCEDURE — 3331090001 HH PPS REVENUE CREDIT

## 2021-11-26 PROCEDURE — 3331090001 HH PPS REVENUE CREDIT

## 2021-11-26 PROCEDURE — 3331090002 HH PPS REVENUE DEBIT

## 2021-11-27 PROCEDURE — 3331090002 HH PPS REVENUE DEBIT

## 2021-11-27 PROCEDURE — 3331090001 HH PPS REVENUE CREDIT

## 2021-11-28 PROCEDURE — 3331090002 HH PPS REVENUE DEBIT

## 2021-11-28 PROCEDURE — 3331090001 HH PPS REVENUE CREDIT

## 2021-11-29 PROCEDURE — 3331090002 HH PPS REVENUE DEBIT

## 2021-11-29 PROCEDURE — 3331090001 HH PPS REVENUE CREDIT

## 2021-11-30 ENCOUNTER — HOME CARE VISIT (OUTPATIENT)
Dept: SCHEDULING | Facility: HOME HEALTH | Age: 81
End: 2021-11-30
Payer: MEDICARE

## 2021-11-30 PROCEDURE — 3331090001 HH PPS REVENUE CREDIT

## 2021-11-30 PROCEDURE — 3331090002 HH PPS REVENUE DEBIT

## 2021-11-30 PROCEDURE — A6253 ABSORPT DRG > 48 SQ IN W/O B: HCPCS

## 2021-11-30 PROCEDURE — MED10987 SALINE,.9%,3 ML,VIAL

## 2021-11-30 PROCEDURE — G0299 HHS/HOSPICE OF RN EA 15 MIN: HCPCS

## 2021-11-30 PROCEDURE — A6216 NON-STERILE GAUZE<=16 SQ IN: HCPCS

## 2021-12-01 VITALS
DIASTOLIC BLOOD PRESSURE: 64 MMHG | OXYGEN SATURATION: 99 % | HEART RATE: 51 BPM | SYSTOLIC BLOOD PRESSURE: 104 MMHG | TEMPERATURE: 98.4 F | RESPIRATION RATE: 15 BRPM

## 2022-01-19 NOTE — PROGRESS NOTES
Problem: Urinary Tract Infection - Adult  Goal: *Absence of infection signs and symptoms  Outcome: Progressing Towards Goal Telephone Note    Contact:  - Soledad ([facility])    RICHARD Writer contact Soledad ([facility]) at [phone] only to leave a vm for a call back    P. Make another attempt at a future date.

## 2022-02-22 ENCOUNTER — HOSPITAL ENCOUNTER (OUTPATIENT)
Dept: INTERVENTIONAL RADIOLOGY/VASCULAR | Age: 82
Discharge: HOME | End: 2022-02-22
Attending: RADIOLOGY

## 2022-03-03 ENCOUNTER — HOSPITAL ENCOUNTER (OUTPATIENT)
Dept: INTERVENTIONAL RADIOLOGY/VASCULAR | Age: 82
Discharge: HOME | End: 2022-03-03
Attending: RADIOLOGY
Payer: MEDICARE

## 2022-03-03 VITALS
SYSTOLIC BLOOD PRESSURE: 120 MMHG | DIASTOLIC BLOOD PRESSURE: 60 MMHG | BODY MASS INDEX: 22.76 KG/M2 | RESPIRATION RATE: 16 BRPM | HEART RATE: 56 BPM | TEMPERATURE: 97.3 F | HEIGHT: 70 IN | OXYGEN SATURATION: 99 % | WEIGHT: 159 LBS

## 2022-03-03 DIAGNOSIS — N13.5 URETERAL OBSTRUCTION: ICD-10-CM

## 2022-03-03 PROCEDURE — 74011250636 HC RX REV CODE- 250/636: Performed by: RADIOLOGY

## 2022-03-03 PROCEDURE — 77030021532 HC CATH ANGI DX IMPRS MRTM -B

## 2022-03-03 PROCEDURE — C1729 CATH, DRAINAGE: HCPCS

## 2022-03-03 PROCEDURE — C1894 INTRO/SHEATH, NON-LASER: HCPCS

## 2022-03-03 PROCEDURE — 74011000636 HC RX REV CODE- 636: Performed by: RADIOLOGY

## 2022-03-03 PROCEDURE — 77030013076 HC PCH OST BAG COLO -A

## 2022-03-03 PROCEDURE — 74011000250 HC RX REV CODE- 250: Performed by: RADIOLOGY

## 2022-03-03 PROCEDURE — C1769 GUIDE WIRE: HCPCS

## 2022-03-03 PROCEDURE — 50688 CHANGE OF URETER TUBE/STENT: CPT

## 2022-03-03 RX ORDER — FENTANYL CITRATE 50 UG/ML
25-50 INJECTION, SOLUTION INTRAMUSCULAR; INTRAVENOUS
Status: DISCONTINUED | OUTPATIENT
Start: 2022-03-03 | End: 2022-03-07 | Stop reason: HOSPADM

## 2022-03-03 RX ORDER — LIDOCAINE HYDROCHLORIDE 20 MG/ML
20-300 INJECTION, SOLUTION INFILTRATION; PERINEURAL
Status: DISCONTINUED | OUTPATIENT
Start: 2022-03-03 | End: 2022-03-07 | Stop reason: HOSPADM

## 2022-03-03 RX ORDER — SODIUM CHLORIDE 9 MG/ML
25 INJECTION, SOLUTION INTRAVENOUS ONCE
Status: COMPLETED | OUTPATIENT
Start: 2022-03-03 | End: 2022-03-03

## 2022-03-03 RX ORDER — MIDAZOLAM HYDROCHLORIDE 1 MG/ML
.5-2 INJECTION, SOLUTION INTRAMUSCULAR; INTRAVENOUS
Status: DISCONTINUED | OUTPATIENT
Start: 2022-03-03 | End: 2022-03-07 | Stop reason: HOSPADM

## 2022-03-03 RX ADMIN — MIDAZOLAM 0.5 MG: 1 INJECTION INTRAMUSCULAR; INTRAVENOUS at 12:16

## 2022-03-03 RX ADMIN — FENTANYL CITRATE 25 MCG: 50 INJECTION, SOLUTION INTRAMUSCULAR; INTRAVENOUS at 12:28

## 2022-03-03 RX ADMIN — FENTANYL CITRATE 50 MCG: 50 INJECTION, SOLUTION INTRAMUSCULAR; INTRAVENOUS at 12:05

## 2022-03-03 RX ADMIN — MIDAZOLAM 1 MG: 1 INJECTION INTRAMUSCULAR; INTRAVENOUS at 12:05

## 2022-03-03 RX ADMIN — MIDAZOLAM 1 MG: 1 INJECTION INTRAMUSCULAR; INTRAVENOUS at 11:53

## 2022-03-03 RX ADMIN — MIDAZOLAM 0.5 MG: 1 INJECTION INTRAMUSCULAR; INTRAVENOUS at 12:28

## 2022-03-03 RX ADMIN — SODIUM CHLORIDE 25 ML/HR: 900 INJECTION, SOLUTION INTRAVENOUS at 11:35

## 2022-03-03 RX ADMIN — LIDOCAINE HYDROCHLORIDE 200 MG: 20 INJECTION, SOLUTION INFILTRATION; PERINEURAL at 11:59

## 2022-03-03 RX ADMIN — IOPAMIDOL 20 ML: 612 INJECTION, SOLUTION INTRAVENOUS at 12:32

## 2022-03-03 RX ADMIN — FENTANYL CITRATE 50 MCG: 50 INJECTION, SOLUTION INTRAMUSCULAR; INTRAVENOUS at 11:53

## 2022-03-03 RX ADMIN — FENTANYL CITRATE 25 MCG: 50 INJECTION, SOLUTION INTRAMUSCULAR; INTRAVENOUS at 12:16

## 2022-03-03 NOTE — H&P
Department of Interventional Radiology  [phone]    History and Physical    Patient:  Emeka Singh MRN:  [id number]  SSN:  [id number]    YOB: 1940  Age:  80 y.o. Sex:  male      Primary Care Provider:  Pippa Godinez MD  Referring Physician:  Alfonso Reese MD    Subjective:     Chief Complaint: ureteral obstruction    History of the Present Illness: The patient is a 80 y.o. male with hx bladder cancer, iliostomy, anastomotic stricture, stone formation with a Neph-U.  He presents today for possible retrograde placement of a neph-U. No fevers, no pain. The drain remains capped and he says the urine is much more clear. NPO.       Past Medical History:   Diagnosis Date    Acute encephalopathy 6/18/2021    Arthritis     OA- bilat knees    Bladder absent     stoma present    Bladder cancer (HCC)     CAD (coronary artery disease)     CABG x 4 in 2004 - no damage- emergenc CABG- Cath 1/2011- total occlusion ramus, total mid occlusion left anterior descending, 50-70% stenosis of mid right coronary artery- \"Normal LEF\"    Cancer ([facility] [address] 1/1/2008    bladder- chemo in bladder then 7/11 bladder removed    CRI (chronic renal insufficiency), stage 3 (moderate) (HCC)     Depression     Family history of diabetes mellitus     Heart murmur     mild mitral regurgitation    Hypercholesteremia     Hypertension     Infection     to s/p hernia wound 4/4865    Other complication of colostomy or enterostomy     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    Followed by surgeon Dr Lilli Salazar intestinal obstruction     Urinary tract infection 6/18/2021    Ventral hernia, unspecified, without mention of obstruction or gangrene      Past Surgical History:   Procedure Laterality Date    HX COLONOSCOPY  1/07    repeat 10 years    HX HEART CATHETERIZATION  2011    HX HERNIA REPAIR  8/26/11    hernia repair    HX HERNIA REPAIR  9/2011    9 days later emergency s/p hernia repair surg    HX VASECTOMY  1974    IR CHANGE NEPHROSTOMY  PYELOS TUBE  10/12/2021    IR CHANGE NEPHROSTOMY  PYELOS TUBE  11/22/2021    IR CONVERT NEPHRO PERC LT TO NEPHROURETERAL CATH EXISTING SI  8/3/2021    IR EXCHANGE NEPHRO PERC LT SI  8/6/2021    IR NEPHROSTOMY PERC LT PLC CATH  SI  6/21/2021    NE CARDIAC SURG PROCEDURE UNLIST  2004    CABG x 4    NE REMV BLADDER,TOTAL  7/2008    ostomy        Review of Systems:    Pertinent items are noted in the History of Present Illness. Prior to Admission medications    Medication Sig Start Date End Date Taking? Authorizing Provider   FLUoxetine (PROzac) 20 mg capsule Take 1 Capsule by mouth daily. 12/9/21   Sheldon Kapoor MD   losartan (COZAAR) 100 mg tablet Take 1 Tablet by mouth daily. 9/28/21   Alex Rojas MD   simvastatin (ZOCOR) 80 mg tablet TAKE 1 TABLET AT BEDTIME 9/28/21   Alex Rojas MD   acetaminophen (TYLENOL) 500 mg tablet Take 500 mg by mouth every six (6) hours as needed for Pain. Take 1-2 tablets every 6 hrs as needed    Provider, Historical   aspirin delayed-release 81 mg tablet Take 1 Tab by mouth daily.  Indications: MYOCARDIAL INFARCTION PREVENTION 6/6/13   Delbert Lacey MD        No Known Allergies    Family History   Problem Relation Age of Onset    Heart Disease Father 37        MI    Hypertension Father     Heart Disease Sister     OSTEOARTHRITIS Sister     Cancer Mother         OVARY    Diabetes Sister     Hypertension Sister      Social History     Tobacco Use    Smoking status: Former Smoker    Smokeless tobacco: Never Used    Tobacco comment: smoked when in [employer]   Substance Use Topics    Alcohol use: No     Comment: rarely have glass of wine maybe one per month        Objective:       Physical Examination:    Vitals:    03/03/22 0957 03/03/22 1003   BP:  (!) 168/77   Pulse:  (!) 53   Resp:  18   Temp:  97.3 °F (36.3 °C)   SpO2:  100%   Weight: 72.1 kg (159 lb)    Height: 5' 10\" (1.778 m)        Pain Assessment  Pain Intensity 1: 4 (03/03/22 0957)  Pain Location 1: Hip  Pain Intervention(s) 1: Rest   goal 0      HEART: regular rate and rhythm  LUNG: clear to auscultation bilaterally  ABDOMEN: normal findings: soft.   drain intact  EXTREMITIES: warm    Laboratory:     Lab Results   Component Value Date/Time    Sodium 139 09/07/2021 10:23 AM    Sodium 139 08/05/2021 03:30 PM    Potassium 4.2 09/07/2021 10:23 AM    Potassium 4.3 08/05/2021 03:30 PM    Chloride 104 09/07/2021 10:23 AM    Chloride 107 08/05/2021 03:30 PM    CO2 22 09/07/2021 10:23 AM    CO2 27 08/05/2021 03:30 PM    Anion gap 5 (L) 08/05/2021 03:30 PM    Anion gap 5 (L) 08/05/2021 09:54 AM    Glucose 104 (H) 09/07/2021 10:23 AM    Glucose 108 (H) 08/05/2021 03:30 PM    BUN 20 09/07/2021 10:23 AM    BUN 15 08/05/2021 03:30 PM    Creatinine 1.38 (H) 09/07/2021 10:23 AM    Creatinine 1.33 08/05/2021 03:30 PM    GFR est AA 56 (L) 09/07/2021 10:23 AM    GFR est AA >60 08/05/2021 03:30 PM    GFR est non-AA 48 (L) 09/07/2021 10:23 AM    GFR est non-AA 55 (L) 08/05/2021 03:30 PM    Calcium 9.0 09/07/2021 10:23 AM    Calcium 9.2 08/05/2021 03:30 PM    Magnesium 1.8 06/19/2021 06:04 AM    Magnesium 2.2 09/02/2011 05:23 AM    Phosphorus 3.1 09/02/2011 05:23 AM    Phosphorus 7.9 (H) 08/18/2008 08:40 AM    Albumin 3.9 09/07/2021 10:23 AM    Albumin 4.0 07/02/2021 09:53 AM    Protein, total 7.4 09/07/2021 10:23 AM    Protein, total 6.9 07/02/2021 09:53 AM    Globulin 3.4 06/19/2021 06:04 AM    Globulin 3.9 (H) 06/18/2021 01:55 PM    A-G Ratio 0.8 (L) 06/19/2021 06:04 AM    A-G Ratio 0.9 (L) 06/18/2021 01:55 PM    ALT (SGPT) 15 09/07/2021 10:23 AM    ALT (SGPT) 19 07/02/2021 09:53 AM     Lab Results   Component Value Date/Time    WBC 7.6 09/07/2021 10:23 AM    WBC 6.2 08/05/2021 09:54 AM    HGB 11.9 (L) 09/07/2021 10:23 AM    HGB 10.3 (L) 08/05/2021 09:54 AM    HCT 34.8 (L) 09/07/2021 10:23 AM    HCT 32.6 (L) 08/05/2021 09:54 AM    PLATELET 734 26/51/6598 10:23 AM    PLATELET 291 (L) 08/05/2021 09:54 AM     Lab Results   Component Value Date/Time    aPTT 34.6 06/19/2021 04:08 PM    Prothrombin time 14.7 06/19/2021 04:08 PM    INR 1.1 06/19/2021 04:08 PM       Assessment:     Ureteral obstruction, NephU in place and functional    Hospital Problems  Date Reviewed: 2/4/2022    None          Plan:     Planned Procedure:  Nephrostogram, possible retrograde placement of NephU    Risks, benefits, and alternatives reviewed with patient and he agrees to proceed with the procedure.       Signed By: Tina Lennox, PA-C     March 3, 2022

## 2022-03-03 NOTE — PROCEDURES
Department of Interventional Radiology  [phone]        Interventional Radiology Brief Procedure Note    Patient: Stephon Mcfadden MRN: [id number]  SSN: [id number]    YOB: 1940  Age: 80 y.o. Sex: male      Date of Procedure: 3/3/2022    Pre-Procedure Diagnosis: ureteral obsruction    Post-Procedure Diagnosis: SAME    Procedure(s): retrograde NUS placement    Brief Description of Procedure: as above    Performed By: Dena Madden MD     Assistants: None    Anesthesia:Moderate Sedation    Estimated Blood Loss: Less than 10ml    Specimens:  None    Implants: All Purpose Drain    Findings: new retrograde 45 cm PCN.   Old pcn left capped    Complications: None    Recommendations: leave capped     Follow Up: few days for nephrostogram and hopeful removal    Signed By: Dena Madden MD     March 3, 2022

## 2022-03-03 NOTE — PROGRESS NOTES
TRANSFER - OUT REPORT:    Verbal report given to JOSHUA Pretty on Bethany Zarate  being transferred to prep/recovery room #7 for routine progression of care       Report consisted of patients Situation, Background, Assessment and   Recommendations(SBAR). Information from the following report(s) SBAR, Kardex, Procedure Summary and MAR was reviewed with the receiving nurse. Lines:   Saline Lock 03/03/22 Distal;Right (Active)        Opportunity for questions and clarification was provided.       Patient transported with:   Registered Nurse

## 2022-03-03 NOTE — DISCHARGE INSTRUCTIONS
[address]  Department of Interventional Radiology  [facility]  [phone] Office  [phone] Fax    Nephrostomy Tube Discharge Instructions    General Information:   A nephrostomy is a tube inserted through your skin and into the kidney. The purpose of the tube is to drain urine outside of the body. This is done in the event fo a block or a damaged ureter. Most of these tubes are usually changed every 2-3 months. However, some patients may need to have their tubes changed more often. Home Care Instructions: You can resume your regular diet. Do not drink alcohol, drive, or make any important legal decisions in the next 24 hours. Do not lift anything heavier than a gallon of milk or do anything strenuous for the next 24 hours. You should not shower for 24 hours. You should not bathe or swim while you have this drain in place. Your doctor may arrange for home health to visit you to help take care of the tube. You should clean the tube and change the dressing at least every 48 hours and more as needed. Keep the dressing clean and dry. Keep up with how much drainage you get from the tube and report this to your doctor. Call If:   You should call your Physician and/or the Radiology Nurse if you have any bleeding other than a small spot on your bandage. Call if you have any signs of infection, fever, or increased pain at the site of the tube. Call if you should have leakage around the tube, a change in the appearance of the urine draining from the tube, increased pain in the lower back, or no drainage from the tube for 12 hours. Call if the tube has pulled back, or has been pulled out. Follow-Up Instructions:  Please see your ordering doctor as he/she has requested. To Reach Us: If you have any questions about your procedure, please call the Interventional Radiology department at [phone].    After business hours (5pm) and weekends, call the answering service at [phone] and ask for the Radiologist on call to be paged. Si tiene Preguntas acerca del procedimiento, por favor llame al departamento de Radiología Intervencional al [phone]. Después de horas de oficina (5 pm) y los fines de [place], llamar al Nasir Coronado al [phone] y pregunte por el Radiologo de [place] [place]. Interventional Radiology General Nurse Discharge    After general anesthesia or intravenous sedation, for 24 hours or while taking prescription Narcotics:  · Limit your activities  · Do not drive and operate hazardous machinery  · Do not make important personal or business decisions  · Do  not drink alcoholic beverages  · If you have not urinated within 8 hours after discharge, please contact your surgeon on call. * Please give a list of your current medications to your Primary Care Provider. * Please update this list whenever your medications are discontinued, doses are     changed, or new medications (including over-the-counter products) are added. * Please carry medication information at all times in case of emergency situations. These are general instructions for a healthy lifestyle:    No smoking/ No tobacco products/ Avoid exposure to second hand smoke  Surgeon General's Warning:  Quitting smoking now greatly reduces serious risk to your health. Obesity, smoking, and sedentary lifestyle greatly increases your risk for illness  A healthy diet, regular physical exercise & weight monitoring are important for maintaining a healthy lifestyle    You may be retaining fluid if you have a history of heart failure or if you experience any of the following symptoms:  Weight gain of 3 pounds or more overnight or 5 pounds in a week, increased swelling in our hands or feet or shortness of breath while lying flat in bed.   Please call your doctor as soon as you notice any of these symptoms; do not wait until your next office visit. Recognize signs and symptoms of STROKE:  F-face looks uneven    A-arms unable to move or move unevenly    S-speech slurred or non-existent    T-time-call 911 as soon as signs and symptoms begin-DO NOT go       Back to bed or wait to see if you get better-TIME IS BRAIN.     Patient Signature:  Date: 3/3/2022  Discharging Nurse: Nanci Wong RN

## 2022-03-08 ENCOUNTER — HOSPITAL ENCOUNTER (OUTPATIENT)
Dept: INTERVENTIONAL RADIOLOGY/VASCULAR | Age: 82
Discharge: HOME | End: 2022-03-08
Attending: RADIOLOGY
Payer: MEDICARE

## 2022-03-08 VITALS
WEIGHT: 159 LBS | DIASTOLIC BLOOD PRESSURE: 80 MMHG | HEART RATE: 49 BPM | BODY MASS INDEX: 22.76 KG/M2 | OXYGEN SATURATION: 99 % | HEIGHT: 70 IN | SYSTOLIC BLOOD PRESSURE: 146 MMHG | TEMPERATURE: 98.6 F | RESPIRATION RATE: 16 BRPM

## 2022-03-08 DIAGNOSIS — N13.5 URETERAL OBSTRUCTION, LEFT: ICD-10-CM

## 2022-03-08 PROCEDURE — 74011000636 HC RX REV CODE- 636: Performed by: RADIOLOGY

## 2022-03-08 PROCEDURE — 50389 REMOVE RENAL TUBE W/FLUORO: CPT

## 2022-03-08 PROCEDURE — C1769 GUIDE WIRE: HCPCS

## 2022-03-08 PROCEDURE — 50431 NJX PX NFROSGRM &/URTRGRM: CPT

## 2022-03-08 RX ORDER — LIDOCAINE HYDROCHLORIDE 20 MG/ML
1-10 INJECTION, SOLUTION INFILTRATION; PERINEURAL
Status: DISCONTINUED | OUTPATIENT
Start: 2022-03-08 | End: 2022-03-12 | Stop reason: HOSPADM

## 2022-03-08 RX ADMIN — IOPAMIDOL 10 ML: 612 INJECTION, SOLUTION INTRAVENOUS at 09:44

## 2022-03-08 NOTE — PROGRESS NOTES
Recovery period without difficulty. Pt alert and oriented and denies pain. Dressing is clean, dry, and intact. Reviewed discharge instructions with patient and caregiver, both verbalized understanding. Pt escorted to lobby discharge area via wheelchair.

## 2022-03-08 NOTE — DISCHARGE INSTRUCTIONS
[address]  Department of Interventional Radiology  [facility]  [phone] Office  [phone] Fax    Nephrostomy Tube Discharge Instructions    General Information:   A nephrostomy is a tube inserted through your skin and into the kidney. The purpose of the tube is to drain urine outside of the body. This is done in the event fo a block or a damaged ureter. Most of these tubes are usually changed every 2-3 months. However, some patients may need to have their tubes changed more often. Home Care Instructions: You can resume your regular diet. Do not drink alcohol, drive, or make any important legal decisions in the next 24 hours. Do not lift anything heavier than a gallon of milk or do anything strenuous for the next 24 hours. You should not shower for 24 hours. You should not bathe or swim while you have this drain in place. Your doctor may arrange for home health to visit you to help take care of the tube. You should clean the tube and change the dressing at least every 48 hours and more as needed. Keep the dressing clean and dry. Keep up with how much drainage you get from the tube and report this to your doctor. Call If:   You should call your Physician and/or the Radiology Nurse if you have any bleeding other than a small spot on your bandage. Call if you have any signs of infection, fever, or increased pain at the site of the tube. Call if you should have leakage around the tube, a change in the appearance of the urine draining from the tube, increased pain in the lower back, or no drainage from the tube for 12 hours. Call if the tube has pulled back, or has been pulled out. Follow-Up Instructions:  Please see your ordering doctor as he/she has requested. Ureteral Stent Discharge Instructions    General Information:   A urethral stent is a tube that acts as a stent.   It goes from the kidney, down the ureter, and into the bladder. The purpose of this tube is to allow for flow of urine in the occurrence of a narrowing or obstruction of the ureter. You may have an external drain bag, or you may urinate in the normal way. Home Care Instructions: You can resume your regular diet and medication regimen. Do not drink alcohol, drive, or make any important legal decisions in the next 24 hours. Do not lift anything heavy, or do anything strenuous for the next 24 hours. You will notice a dressing on your flank (low back) after your procedure. Please monitor your urine output and give those results to your physician at each visit. You should have at least 30 milliliters of urine each hour. Call If:   You should call your Physician and/or the Radiology Nurse if you have bleeding other than a small spot on your bandage. Call if you have any signs of infection, fever, or increased pain at the site. Call if you have a feeling of pressure in your abdomen, pelvic pain, back pain, increased urgency to urinate, have a feeling that you are not totally emptying your bladder when you urinate, decreased urinary output or no urine output. This may indicate a problem with the stent. You may call us or your urologist.      Follow-Up Instructions:  Please see your urologist, or ordering doctor as he/she has requested. To Reach Us: If you have any questions about your procedure, please call the Interventional Radiology department at [phone]. After business hours (5pm) and weekends, call the answering service at [phone] and ask for the Radiologist on call to be paged. Si tiene Preguntas acerca del procedimiento, por favor llame al departamento de Radiología Intervencional al [phone]. Después de horas de oficina (5 pm) y los fines de [place], llamar al Brenna Coronado al [phone] y pregunte por el Radiologo de [facility].      Interventional Radiology General Nurse Discharge    After general anesthesia or intravenous sedation, for 24 hours or while taking prescription Narcotics:  · Limit your activities  · Do not drive and operate hazardous machinery  · Do not make important personal or business decisions  · Do  not drink alcoholic beverages  · If you have not urinated within 8 hours after discharge, please contact your surgeon on call. * Please give a list of your current medications to your Primary Care Provider. * Please update this list whenever your medications are discontinued, doses are     changed, or new medications (including over-the-counter products) are added. * Please carry medication information at all times in case of emergency situations. These are general instructions for a healthy lifestyle:    No smoking/ No tobacco products/ Avoid exposure to second hand smoke  Surgeon General's Warning:  Quitting smoking now greatly reduces serious risk to your health. Obesity, smoking, and sedentary lifestyle greatly increases your risk for illness  A healthy diet, regular physical exercise & weight monitoring are important for maintaining a healthy lifestyle    You may be retaining fluid if you have a history of heart failure or if you experience any of the following symptoms:  Weight gain of 3 pounds or more overnight or 5 pounds in a week, increased swelling in our hands or feet or shortness of breath while lying flat in bed. Please call your doctor as soon as you notice any of these symptoms; do not wait until your next office visit. Recognize signs and symptoms of STROKE:  F-face looks uneven    A-arms unable to move or move unevenly    S-speech slurred or non-existent    T-time-call 911 as soon as signs and symptoms begin-DO NOT go       Back to bed or wait to see if you get better-TIME IS BRAIN.       Patient Signature:  Date: 3/8/2022  Discharging Nurse: Stevie Saldana RN

## 2022-03-18 PROBLEM — R73.03 PREDIABETES: Status: ACTIVE | Noted: 2021-09-07

## 2022-03-18 PROBLEM — E78.2 MIXED HYPERLIPIDEMIA: Status: ACTIVE | Noted: 2017-07-31

## 2022-03-19 PROBLEM — N20.1 URETERAL STONE: Status: ACTIVE | Noted: 2021-08-03

## 2022-03-19 PROBLEM — I10 ESSENTIAL HYPERTENSION: Status: ACTIVE | Noted: 2017-07-31

## 2022-07-27 ENCOUNTER — HOSPITAL ENCOUNTER (OUTPATIENT)
Dept: INTERVENTIONAL RADIOLOGY/VASCULAR | Age: 82
Discharge: HOME | End: 2022-07-30
Payer: MEDICARE

## 2022-07-27 VITALS
SYSTOLIC BLOOD PRESSURE: 114 MMHG | HEART RATE: 69 BPM | OXYGEN SATURATION: 97 % | DIASTOLIC BLOOD PRESSURE: 76 MMHG | RESPIRATION RATE: 18 BRPM | TEMPERATURE: 97.9 F

## 2022-07-27 DIAGNOSIS — N18.9 CHRONIC KIDNEY DISEASE, UNSPECIFIED CKD STAGE: ICD-10-CM

## 2022-07-27 PROCEDURE — 6370000000 HC RX 637 (ALT 250 FOR IP): Performed by: RADIOLOGY

## 2022-07-27 PROCEDURE — 50435 EXCHANGE NEPHROSTOMY CATH: CPT

## 2022-07-27 PROCEDURE — 6360000004 HC RX CONTRAST MEDICATION: Performed by: RADIOLOGY

## 2022-07-27 RX ORDER — DIAZEPAM 5 MG/1
10 TABLET ORAL ONCE
Status: COMPLETED | OUTPATIENT
Start: 2022-07-27 | End: 2022-07-27

## 2022-07-27 RX ORDER — LIDOCAINE HYDROCHLORIDE 20 MG/ML
SOLUTION OROPHARYNGEAL
Status: COMPLETED | OUTPATIENT
Start: 2022-07-27 | End: 2022-07-27

## 2022-07-27 RX ORDER — OXYCODONE HYDROCHLORIDE 5 MG/1
10 TABLET ORAL ONCE
Status: COMPLETED | OUTPATIENT
Start: 2022-07-27 | End: 2022-07-27

## 2022-07-27 RX ORDER — FLUOXETINE HYDROCHLORIDE 20 MG/1
20 CAPSULE ORAL DAILY
Qty: 51 CAPSULE | Refills: 0 | OUTPATIENT
Start: 2022-07-27 | End: 2022-09-16

## 2022-07-27 RX ORDER — FLUOXETINE HYDROCHLORIDE 20 MG/1
CAPSULE ORAL
Qty: 90 CAPSULE | OUTPATIENT
Start: 2022-07-27

## 2022-07-27 RX ADMIN — OXYCODONE 10 MG: 5 TABLET ORAL at 10:38

## 2022-07-27 RX ADMIN — Medication 10 ML: at 11:36

## 2022-07-27 RX ADMIN — DIAZEPAM 10 MG: 5 TABLET ORAL at 10:37

## 2022-07-27 RX ADMIN — IOPAMIDOL 15 ML: 612 INJECTION, SOLUTION INTRAVENOUS at 11:40

## 2022-07-27 NOTE — DISCHARGE INSTRUCTIONS
[address] [facility]     Department of Interventional Radiology     [facility] Radiology     [phone] Office     [phone] Fax          [address] [facility]     Department of Interventional Radiology     [facility] Radiology     [phone] Office     [phone] Fax       Interventional Radiology General Nurse Discharge           After general anesthesia or intravenous sedation, for 24 hours or while taking prescription Narcotics:     ? Limit your activities     ? Do not drive and operate hazardous machinery     ? Do not make important personal or business decisions     ? Do  not drink alcoholic beverages     ? If you have not urinated within 8 hours after discharge, please contact your surgeon on call. * Please give a list of your current medications to your Primary Care Provider. * Please update this list whenever your medications are discontinued, doses are        changed, or new medications (including over-the-counter products) are added. * Please carry medication information at all times in case of emergency situations. These are general instructions for a healthy lifestyle:           No smoking/ No tobacco products/ Avoid exposure to second hand smoke     Surgeon General's Warning:  Quitting smoking now greatly reduces serious risk to your health. Obesity, smoking, and sedentary lifestyle greatly increases your risk for illness     A healthy diet, regular physical exercise & weight monitoring are important for maintaining a healthy lifestyle           You may be retaining fluid if you have a history of heart failure or if you experience any of the following symptoms:  Weight gain of 3 pounds or more overnight or 5 pounds in a week, increased swelling in our hands or feet or shortness of breath while lying flat in bed.   Please call your doctor as soon as you notice any of these symptoms; do not wait until your next office visit. Recognize signs and symptoms of STROKE:     F-face looks uneven           A-arms unable to move or move unevenly           S-speech slurred or non-existent           T-time-call 911 as soon as signs and symptoms begin-DO NOT go          Back to bed or wait to see if you get better-TIME IS BRAIN. GENERAL DRAIN DISCHARGE INSTRUCTIONS           General Information:        A plastic catheter has been inserted through your skin and into area that needs to be drained. Your doctor ordered this procedure to be done in the event of an abscess, or other fluid collection in your body. You will notice a drainage bag attached to the catheter. When the fluid has all been removed, your doctor will send you back to us for the removal of the catheter. If you are going home with the catheter in place, your doctor may order a home health nurse to come to your house and assist with the care of the catheter. Your doctor will most likely order antibiotic tablets for you to take while you have this tube. Home Care Instructions: You can resume your regular diet and medication regimen. Do not drink alcohol, drive, or make any important legal decisions in the next 24 hours. Do not lift anything heavier than a gallon of milk, or do anything strenuous for the next 24 hours. You should not shower for 24 hours. You should cover the tube with plastic wrap and tape to keep it dry when you shower. You can disconnect the drainage bag while showering. The home health nurse or the nurse who discharges from the hospital will teach you how to do this. Do not take a bath, swim or immerse yourself in water as long as you have this drainage tube. You should clean the tube and change the dressing every  48 hours and as needed. Keep the dressing clean and dry. Keep up with how much drainage you get from the tube and report this to your doctor on each visit.            Call If:        You should call your Physician and/or the Radiology Nurse if you have any bleeding other than a small spot on your bandage. Call if you have any signs of infection, fever, or increased pain at the site of the tube. Call if you should have leakage around the tube, an increased yellowing of the skin, increased pain in the abdomen, a change in the amount or appearance of the fluid, or if the tube gets pulled out some or all the way out. Follow-Up Instructions: Please see your ordering doctor as he/she has requested.             To Reach Us:                   Patient Signature:     Date:     Discharging Nurse:

## 2022-07-28 ENCOUNTER — OFFICE VISIT (OUTPATIENT)
Dept: INTERNAL MEDICINE CLINIC | Facility: CLINIC | Age: 82
End: 2022-07-28
Payer: MEDICARE

## 2022-07-28 VITALS
OXYGEN SATURATION: 98 % | HEART RATE: 77 BPM | WEIGHT: 163 LBS | TEMPERATURE: 98.6 F | DIASTOLIC BLOOD PRESSURE: 77 MMHG | SYSTOLIC BLOOD PRESSURE: 123 MMHG | BODY MASS INDEX: 23.39 KG/M2

## 2022-07-28 DIAGNOSIS — F32.5 MAJOR DEPRESSIVE DISORDER WITH SINGLE EPISODE, IN FULL REMISSION (HCC): Primary | ICD-10-CM

## 2022-07-28 PROCEDURE — 1036F TOBACCO NON-USER: CPT | Performed by: NURSE PRACTITIONER

## 2022-07-28 PROCEDURE — 1123F ACP DISCUSS/DSCN MKR DOCD: CPT | Performed by: NURSE PRACTITIONER

## 2022-07-28 PROCEDURE — G8427 DOCREV CUR MEDS BY ELIG CLIN: HCPCS | Performed by: NURSE PRACTITIONER

## 2022-07-28 PROCEDURE — G8420 CALC BMI NORM PARAMETERS: HCPCS | Performed by: NURSE PRACTITIONER

## 2022-07-28 PROCEDURE — 99213 OFFICE O/P EST LOW 20 MIN: CPT | Performed by: NURSE PRACTITIONER

## 2022-07-28 RX ORDER — FLUOXETINE HYDROCHLORIDE 20 MG/1
20 CAPSULE ORAL DAILY
Qty: 90 CAPSULE | Refills: 0 | Status: SHIPPED | OUTPATIENT
Start: 2022-07-28 | End: 2022-08-17 | Stop reason: SDUPTHER

## 2022-07-28 ASSESSMENT — PATIENT HEALTH QUESTIONNAIRE - PHQ9
SUM OF ALL RESPONSES TO PHQ QUESTIONS 1-9: 0
SUM OF ALL RESPONSES TO PHQ QUESTIONS 1-9: 0
4. FEELING TIRED OR HAVING LITTLE ENERGY: 0
7. TROUBLE CONCENTRATING ON THINGS, SUCH AS READING THE NEWSPAPER OR WATCHING TELEVISION: 0
3. TROUBLE FALLING OR STAYING ASLEEP: 0
SUM OF ALL RESPONSES TO PHQ QUESTIONS 1-9: 0
SUM OF ALL RESPONSES TO PHQ QUESTIONS 1-9: 0
9. THOUGHTS THAT YOU WOULD BE BETTER OFF DEAD, OR OF HURTING YOURSELF: 0
10. IF YOU CHECKED OFF ANY PROBLEMS, HOW DIFFICULT HAVE THESE PROBLEMS MADE IT FOR YOU TO DO YOUR WORK, TAKE CARE OF THINGS AT HOME, OR GET ALONG WITH OTHER PEOPLE: 0
6. FEELING BAD ABOUT YOURSELF - OR THAT YOU ARE A FAILURE OR HAVE LET YOURSELF OR YOUR FAMILY DOWN: 0
5. POOR APPETITE OR OVEREATING: 0
8. MOVING OR SPEAKING SO SLOWLY THAT OTHER PEOPLE COULD HAVE NOTICED. OR THE OPPOSITE, BEING SO FIGETY OR RESTLESS THAT YOU HAVE BEEN MOVING AROUND A LOT MORE THAN USUAL: 0
2. FEELING DOWN, DEPRESSED OR HOPELESS: 0

## 2022-07-28 ASSESSMENT — ENCOUNTER SYMPTOMS
SORE THROAT: 0
DIARRHEA: 0
ABDOMINAL PAIN: 0
SHORTNESS OF BREATH: 0
COUGH: 0
BACK PAIN: 0
NAUSEA: 0
RHINORRHEA: 0
CONSTIPATION: 0
SINUS PAIN: 0
EYE PAIN: 0
VOMITING: 0

## 2022-07-28 NOTE — PROGRESS NOTES
[address] [place]  [address] [facility]  Tel# [phone]  Fax# [phone]       Mary Fritz, [facility]-BC  Family Nurse Practitioner            Date of Visit: 2022     Adelita Fraser (: 1940) is a [de-identified] y.o. male established patient, here for evaluation of the following chief complaint(s):    Chief Complaint   Patient presents with    Medication Problem     Needs a refills          Patient Care Team:  Thelma Rodriguez MD as PCP - Yolanda Gray MD as PCP - [facility] Provider         History of Present Illness      Presents here as a walk-in with a female caregiver/ for med refill on Prozac. States he has been out for about 4-5 days, forgot to call in earlier. Depression  Chronic condition  Well controlled on Fluoxetine.               Patient Active Problem List   Diagnosis    Stage 3a chronic kidney disease ([address]    Prediabetes    Mixed hyperlipidemia    Coronary artery disease involving coronary bypass graft of native heart without angina pectoris    Ureteral stone    Major depressive disorder with single episode, in full remission [facility])    Essential hypertension       Past Medical History:   Diagnosis Date    Acute encephalopathy 2021    Arthritis     OA- bilat knees    Bladder absent     stoma present    Bladder cancer ([address]     CAD (coronary artery disease)     CABG x 4 in  - no damage- emergenc CABG- Cath 2011- total occlusion ramus, total mid occlusion left anterior descending, 50-70% stenosis of mid right coronary artery- \"Normal LEF\"    Cancer ([address] 2008    bladder- chemo in bladder then  bladder removed    CRI (chronic renal insufficiency), stage 3 (moderate) ([facility])     Depression     Family history of diabetes mellitus     Heart murmur     mild mitral regurgitation    Hypercholesteremia     Hypertension     Infection     to s/p hernia wound 2928    Other complication of colostomy or enterostomy     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    Followed by surgeon Dr MINA ESPINOSA [facility]     Unspecified intestinal obstruction     Urinary tract infection 6/18/2021    Ventral hernia, unspecified, without mention of obstruction or gangrene        Past Surgical History:   Procedure Laterality Date    CARDIAC CATHETERIZATION  2011    COLONOSCOPY  1/07    repeat 10 years    HERNIA REPAIR  9/2011    9 days later emergency s/p hernia repair surg    HERNIA REPAIR  8/26/11    hernia repair    IR CHANGE URET TUBE VIA ILEAL CONDUIT  3/3/2022    IR NEPHROSTOGRAM EXISTING ACCESS  3/8/2022    IR NEPHROSTOMY CONVERT CATH TO NEPHROURETERAL CATH  8/3/2021    IR NEPHROSTOMY CONVERT CATH TO NEPHROURETERAL CATH  8/3/2021    IR NEPHROSTOMY CONVERT CATH TO NEPHROURETERAL CATH 8/3/2021 [facility]    IR NEPHROSTOMY EXCHANGE CATHETER  8/6/2021    IR NEPHROSTOMY EXCHANGE CATHETER  10/12/2021    IR NEPHROSTOMY EXCHANGE CATHETER  11/22/2021    IR NEPHROSTOMY PERCUTANEOUS LEFT  6/21/2021    IR NEPHROSTOMY PERCUTANEOUS LEFT  6/21/2021    IR NEPHROSTOMY PERCUTANEOUS LEFT 6/21/2021 [facility]    NE CARDIAC SURG PROCEDURE UNLIST  2004    CABG x 4    REMV BLADDER,TOTAL  7/2008    ostomy    VASECTOMY  1974       Family History   Problem Relation Age of Onset    Heart Disease Father 37        MI    Hypertension Father     Heart Disease Sister     Osteoarthritis Sister     Cancer Mother         OVARY    Diabetes Sister     Hypertension Sister          ALLERGY  No Known Allergies        Current Outpatient Medications on File Prior to Visit   Medication Sig Dispense Refill    acetaminophen (TYLENOL) 500 MG tablet Take 500 mg by mouth every 6 hours as needed      aspirin 81 MG EC tablet Take 81 mg by mouth daily      losartan (COZAAR) 100 MG tablet Take 100 mg by mouth daily      simvastatin (ZOCOR) 80 MG tablet TAKE 1 TABLET AT BEDTIME       No current facility-administered medications on file prior to visit.             Review of Systems  Review of Systems Constitutional:  Negative for chills, fatigue and fever. HENT:  Negative for congestion, postnasal drip, rhinorrhea, sinus pain, sneezing and sore throat. Eyes:  Negative for pain and visual disturbance. Respiratory:  Negative for cough and shortness of breath. Cardiovascular:  Negative for chest pain, palpitations and leg swelling. Gastrointestinal:  Negative for abdominal pain, constipation, diarrhea, nausea and vomiting. Genitourinary:  Negative for dysuria, frequency and urgency. Musculoskeletal:  Negative for back pain, gait problem and joint swelling. Skin:  Negative for rash and wound. Neurological:  Negative for dizziness and headaches. Psychiatric/Behavioral:  Negative for behavioral problems, sleep disturbance and suicidal ideas. The patient is not nervous/anxious. Depression well controlled              Vitals:    07/28/22 1128   BP: 123/77   Pulse: 77   Temp: 98.6 °F (37 °C)   TempSrc: Temporal   SpO2: 98%   Weight: 163 lb (73.9 kg)              Physical Exam  Physical Exam  Constitutional:       General: He is not in acute distress. Appearance: He is not ill-appearing. HENT:      Head: Normocephalic and atraumatic. Eyes:      Pupils: Pupils are equal, round, and reactive to light. Cardiovascular:      Rate and Rhythm: Normal rate and regular rhythm. Pulmonary:      Effort: Pulmonary effort is normal. No respiratory distress. Breath sounds: Normal breath sounds. Abdominal:      General: Bowel sounds are normal.      Palpations: Abdomen is soft. Musculoskeletal:      Cervical back: Neck supple. Comments: Ambulates with walker   Skin:     General: Skin is warm and dry. Neurological:      General: No focal deficit present. Mental Status: He is alert and oriented to person, place, and time. Psychiatric:         Mood and Affect: Mood normal.         Thought Content: Thought content normal.              Assessment/Plan:          ICD-10-CM    1. Major depressive disorder with single episode, in full remission ([facility]  F32.5 FLUoxetine (PROZAC) 20 MG capsule               Cheikh Hernandez was seen today for medication problem. Diagnoses and all orders for this visit:    Major depressive disorder with single episode, in full remission ([facility]  -     FLUoxetine (PROZAC) 20 MG capsule; Take 1 capsule by mouth in the morning. Advised to avoid or limit aggravating or precipitating factors. Advised to seek immediate medical attention for any chest pains, palpitations or suicidal thoughts. Continue other meds and treatment plan by PCP and specialists (urologist, cardiologist). Follow Up  Return as needed, for Earliest appt [facility] PCP Dr. Kalani Kern.              Veronica Conway, DNP, FNP-BC

## 2022-08-17 ENCOUNTER — OFFICE VISIT (OUTPATIENT)
Dept: INTERNAL MEDICINE CLINIC | Facility: CLINIC | Age: 82
End: 2022-08-17
Payer: MEDICARE

## 2022-08-17 VITALS
BODY MASS INDEX: 23.19 KG/M2 | WEIGHT: 162 LBS | DIASTOLIC BLOOD PRESSURE: 80 MMHG | HEIGHT: 70 IN | SYSTOLIC BLOOD PRESSURE: 119 MMHG | TEMPERATURE: 97.2 F | HEART RATE: 75 BPM | OXYGEN SATURATION: 97 %

## 2022-08-17 DIAGNOSIS — R73.03 PREDIABETES: ICD-10-CM

## 2022-08-17 DIAGNOSIS — I25.810 CORONARY ARTERY DISEASE INVOLVING CORONARY BYPASS GRAFT OF NATIVE HEART WITHOUT ANGINA PECTORIS: Primary | ICD-10-CM

## 2022-08-17 DIAGNOSIS — E78.2 MIXED HYPERLIPIDEMIA: ICD-10-CM

## 2022-08-17 DIAGNOSIS — I25.810 CORONARY ARTERY DISEASE INVOLVING CORONARY BYPASS GRAFT OF NATIVE HEART WITHOUT ANGINA PECTORIS: ICD-10-CM

## 2022-08-17 DIAGNOSIS — I10 ESSENTIAL HYPERTENSION: ICD-10-CM

## 2022-08-17 DIAGNOSIS — F32.5 MAJOR DEPRESSIVE DISORDER WITH SINGLE EPISODE, IN FULL REMISSION (HCC): ICD-10-CM

## 2022-08-17 DIAGNOSIS — N18.31 STAGE 3A CHRONIC KIDNEY DISEASE (HCC): ICD-10-CM

## 2022-08-17 PROBLEM — N20.1 URETERAL STONE: Status: RESOLVED | Noted: 2021-08-03 | Resolved: 2022-08-17

## 2022-08-17 LAB
BASOPHILS # BLD: 0.1 K/UL (ref 0–0.2)
BASOPHILS NFR BLD: 1 % (ref 0–2)
DIFFERENTIAL METHOD BLD: ABNORMAL
EOSINOPHIL # BLD: 0.5 K/UL (ref 0–0.8)
EOSINOPHIL NFR BLD: 6 % (ref 0.5–7.8)
ERYTHROCYTE [DISTWIDTH] IN BLOOD BY AUTOMATED COUNT: 13.3 % (ref 11.9–14.6)
HCT VFR BLD AUTO: 43.5 % (ref 41.1–50.3)
HGB BLD-MCNC: 13.7 G/DL (ref 13.6–17.2)
IMM GRANULOCYTES # BLD AUTO: 0 K/UL (ref 0–0.5)
IMM GRANULOCYTES NFR BLD AUTO: 0 % (ref 0–5)
LYMPHOCYTES # BLD: 2.7 K/UL (ref 0.5–4.6)
LYMPHOCYTES NFR BLD: 36 % (ref 13–44)
MCH RBC QN AUTO: 30.9 PG (ref 26.1–32.9)
MCHC RBC AUTO-ENTMCNC: 31.5 G/DL (ref 31.4–35)
MCV RBC AUTO: 98 FL (ref 79.6–97.8)
MONOCYTES # BLD: 0.7 K/UL (ref 0.1–1.3)
MONOCYTES NFR BLD: 9 % (ref 4–12)
NEUTS SEG # BLD: 3.7 K/UL (ref 1.7–8.2)
NEUTS SEG NFR BLD: 48 % (ref 43–78)
NRBC # BLD: 0 K/UL (ref 0–0.2)
PLATELET # BLD AUTO: 181 K/UL (ref 150–450)
PMV BLD AUTO: 9.5 FL (ref 9.4–12.3)
RBC # BLD AUTO: 4.44 M/UL (ref 4.23–5.6)
WBC # BLD AUTO: 7.6 K/UL (ref 4.3–11.1)

## 2022-08-17 PROCEDURE — 99214 OFFICE O/P EST MOD 30 MIN: CPT | Performed by: INTERNAL MEDICINE

## 2022-08-17 PROCEDURE — 1123F ACP DISCUSS/DSCN MKR DOCD: CPT | Performed by: INTERNAL MEDICINE

## 2022-08-17 PROCEDURE — 1036F TOBACCO NON-USER: CPT | Performed by: INTERNAL MEDICINE

## 2022-08-17 PROCEDURE — G8427 DOCREV CUR MEDS BY ELIG CLIN: HCPCS | Performed by: INTERNAL MEDICINE

## 2022-08-17 PROCEDURE — G8420 CALC BMI NORM PARAMETERS: HCPCS | Performed by: INTERNAL MEDICINE

## 2022-08-17 RX ORDER — FLUOXETINE HYDROCHLORIDE 20 MG/1
20 CAPSULE ORAL DAILY
Qty: 90 CAPSULE | Refills: 1 | Status: SHIPPED | OUTPATIENT
Start: 2022-08-17

## 2022-08-17 ASSESSMENT — ANXIETY QUESTIONNAIRES
6. BECOMING EASILY ANNOYED OR IRRITABLE: 0
5. BEING SO RESTLESS THAT IT IS HARD TO SIT STILL: 0
1. FEELING NERVOUS, ANXIOUS, OR ON EDGE: 0
GAD7 TOTAL SCORE: 0
IF YOU CHECKED OFF ANY PROBLEMS ON THIS QUESTIONNAIRE, HOW DIFFICULT HAVE THESE PROBLEMS MADE IT FOR YOU TO DO YOUR WORK, TAKE CARE OF THINGS AT HOME, OR GET ALONG WITH OTHER PEOPLE: NOT DIFFICULT AT ALL
4. TROUBLE RELAXING: 0
7. FEELING AFRAID AS IF SOMETHING AWFUL MIGHT HAPPEN: 0
2. NOT BEING ABLE TO STOP OR CONTROL WORRYING: 0
3. WORRYING TOO MUCH ABOUT DIFFERENT THINGS: 0

## 2022-08-17 ASSESSMENT — PATIENT HEALTH QUESTIONNAIRE - PHQ9
SUM OF ALL RESPONSES TO PHQ QUESTIONS 1-9: 0
3. TROUBLE FALLING OR STAYING ASLEEP: 0
SUM OF ALL RESPONSES TO PHQ QUESTIONS 1-9: 0
6. FEELING BAD ABOUT YOURSELF - OR THAT YOU ARE A FAILURE OR HAVE LET YOURSELF OR YOUR FAMILY DOWN: 0
4. FEELING TIRED OR HAVING LITTLE ENERGY: 0
7. TROUBLE CONCENTRATING ON THINGS, SUCH AS READING THE NEWSPAPER OR WATCHING TELEVISION: 0
8. MOVING OR SPEAKING SO SLOWLY THAT OTHER PEOPLE COULD HAVE NOTICED. OR THE OPPOSITE, BEING SO FIGETY OR RESTLESS THAT YOU HAVE BEEN MOVING AROUND A LOT MORE THAN USUAL: 0
2. FEELING DOWN, DEPRESSED OR HOPELESS: 0
9. THOUGHTS THAT YOU WOULD BE BETTER OFF DEAD, OR OF HURTING YOURSELF: 0
10. IF YOU CHECKED OFF ANY PROBLEMS, HOW DIFFICULT HAVE THESE PROBLEMS MADE IT FOR YOU TO DO YOUR WORK, TAKE CARE OF THINGS AT HOME, OR GET ALONG WITH OTHER PEOPLE: 0
SUM OF ALL RESPONSES TO PHQ QUESTIONS 1-9: 0
SUM OF ALL RESPONSES TO PHQ QUESTIONS 1-9: 0
5. POOR APPETITE OR OVEREATING: 0

## 2022-08-17 ASSESSMENT — ENCOUNTER SYMPTOMS
SHORTNESS OF BREATH: 0
BLOOD IN STOOL: 0

## 2022-08-17 NOTE — PROGRESS NOTES
Physical Exam  Vitals and nursing note reviewed. Constitutional:       General: He is not in acute distress. Appearance: Normal appearance. He is normal weight. He is not ill-appearing, toxic-appearing or diaphoretic. HENT:      Head: Normocephalic and atraumatic. Right Ear: External ear normal.      Left Ear: External ear normal.   Eyes:      General: No scleral icterus. Right eye: No discharge. Left eye: No discharge. Conjunctiva/sclera: Conjunctivae normal.   Cardiovascular:      Rate and Rhythm: Normal rate and regular rhythm. Heart sounds: Normal heart sounds. No murmur heard. No friction rub. No gallop. Pulmonary:      Effort: Pulmonary effort is normal. No respiratory distress. Breath sounds: Normal breath sounds. No stridor. No wheezing, rhonchi or rales. Abdominal:      General: Abdomen is flat. Bowel sounds are normal. There is no distension. Palpations: Abdomen is soft. There is no mass. Tenderness: There is no abdominal tenderness. There is no guarding or rebound. Musculoskeletal:      Right lower leg: No edema. Left lower leg: No edema. Skin:     General: Skin is warm and dry. Coloration: Skin is not jaundiced or pale. Findings: No erythema. Neurological:      General: No focal deficit present. Mental Status: He is alert and oriented to person, place, and time. Mental status is at baseline. Psychiatric:         Mood and Affect: Mood normal.         Behavior: Behavior normal.         Thought Content: Thought content normal.         Judgment: Judgment normal.      ASSESSMENT AND PLAN:     CAD: CABG x 4 in 2004. Follows with Cardiology (Dr. Christen Tristan). Maintained on Asa. Risk factors medically modified per below. Taking medications w/o problems. Well controlled w/o angina or JORDAN. Continue Asa (No bleeding). Continue risk factor modification per below.   Follow up with Dr. Christen Tristan.   HTN: Taking current regimen (Losartan 100) w/o problems. Home BPs = Not being checked. Well controlled. Continue current regimen. Asked to monitor BP at home, call me if it runs above 140/80, and bring in a log next time. HLD: Taking current regimen (Zocor 80) w/o problems. Well controlled. Continue current regimen. FLPs:  9/2/20 on Zocor 80 = [132/71/48/64]. 9/7/21 on Zocor 80 = [125/69/37/102]. 8/17/22 on Zocor 80 = [  Prediabetes: Stable off medications. Continue TLCs. A1C's:  12/2/20 = 6.4.  3/17/22 = 5.9.   8/17/22 =   CKD3: Baseline Cr ?  1.0-1.6 and stable (Since 2017). Stable and asymptomatic. Continue to monitor. Counseled to avoid NSAIDs. Depression: Taking current regimen (Prozac 20) without problems and with good control of symptoms. Well controlled. Continue current regimen. HCM:  Td: 10/16/12. Flu: 12/9/21. Covid: Recommend staying UTD on Covid vaccinations/boosters. F/u: 3 Months. No planned labs at that visit    Savannah Aranda was seen today for cholesterol problem and hypertension. Diagnoses and all orders for this visit:    Coronary artery disease involving coronary bypass graft of native heart without angina pectoris  -     Basic Metabolic Panel; Future  -     CBC with Auto Differential; Future  -     Lipid Panel; Future  -     Hepatic Function Panel; Future  -     TSH; Future    Essential hypertension  -     Basic Metabolic Panel; Future  -     CBC with Auto Differential; Future  -     Lipid Panel; Future  -     Hepatic Function Panel; Future  -     TSH; Future    Mixed hyperlipidemia  -     Basic Metabolic Panel; Future  -     CBC with Auto Differential; Future  -     Lipid Panel; Future  -     Hepatic Function Panel; Future  -     TSH; Future    Prediabetes  -     Basic Metabolic Panel; Future  -     Hemoglobin A1C; Future    Stage 3a chronic kidney disease ([facility]  -     Basic Metabolic Panel; Future  -     CBC with Auto Differential; Future  -     Lipid Panel;  Future  -     Hepatic Function Panel; Future  -     TSH; Future    Major depressive disorder with single episode, in full remission ([facility]  -     FLUoxetine (PROZAC) 20 MG capsule; Take 1 capsule by mouth daily    Return in about 3 months (around 11/17/2022).

## 2022-08-17 NOTE — PATIENT INSTRUCTIONS
I recommend all standard healthcare maintenance and cancer screenings (Colon cancer screening, Mammogram and Bone Density if female and appropriate, etc) and standard immunizations (Flu, Pneumonia, Covid-19, Tetanus boosters, etc) as appropriate and due to lower your risk for potentially preventable morbidity/mortality from these diseases. Check BP 1-2 times per week. Call our office if BP runs above 140/80.

## 2022-08-18 LAB
ALBUMIN SERPL-MCNC: 4 G/DL (ref 3.2–4.6)
ALBUMIN/GLOB SERPL: 1.1 {RATIO} (ref 1.2–3.5)
ALP SERPL-CCNC: 62 U/L (ref 50–136)
ALT SERPL-CCNC: 27 U/L (ref 12–65)
ANION GAP SERPL CALC-SCNC: 3 MMOL/L (ref 7–16)
AST SERPL-CCNC: 22 U/L (ref 15–37)
BILIRUB DIRECT SERPL-MCNC: 0.1 MG/DL
BILIRUB SERPL-MCNC: 0.6 MG/DL (ref 0.2–1.1)
BUN SERPL-MCNC: 26 MG/DL (ref 8–23)
CALCIUM SERPL-MCNC: 9 MG/DL (ref 8.3–10.4)
CHLORIDE SERPL-SCNC: 107 MMOL/L (ref 98–107)
CHOLEST SERPL-MCNC: 147 MG/DL
CO2 SERPL-SCNC: 26 MMOL/L (ref 21–32)
CREAT SERPL-MCNC: 1.7 MG/DL (ref 0.8–1.5)
EST. AVERAGE GLUCOSE BLD GHB EST-MCNC: 131 MG/DL
GLOBULIN SER CALC-MCNC: 3.5 G/DL (ref 2.3–3.5)
GLUCOSE SERPL-MCNC: 101 MG/DL (ref 65–100)
HBA1C MFR BLD: 6.2 % (ref 4.8–5.6)
HDLC SERPL-MCNC: 49 MG/DL (ref 40–60)
HDLC SERPL: 3 {RATIO}
LDLC SERPL CALC-MCNC: 77.4 MG/DL
POTASSIUM SERPL-SCNC: 4.5 MMOL/L (ref 3.5–5.1)
PROT SERPL-MCNC: 7.5 G/DL (ref 6.3–8.2)
SODIUM SERPL-SCNC: 136 MMOL/L (ref 136–145)
TRIGL SERPL-MCNC: 103 MG/DL (ref 35–150)
TSH, 3RD GENERATION: 1.36 UIU/ML (ref 0.36–3.74)
VLDLC SERPL CALC-MCNC: 20.6 MG/DL (ref 6–23)

## 2022-09-16 ENCOUNTER — OFFICE VISIT (OUTPATIENT)
Dept: UROLOGY | Age: 82
End: 2022-09-16
Payer: MEDICARE

## 2022-09-16 DIAGNOSIS — C67.9 MALIGNANT NEOPLASM OF URINARY BLADDER, UNSPECIFIED SITE (HCC): Primary | ICD-10-CM

## 2022-09-16 LAB
BILIRUBIN, URINE, POC: NEGATIVE
BLOOD URINE, POC: ABNORMAL
GLUCOSE URINE, POC: NEGATIVE
KETONES, URINE, POC: NEGATIVE
LEUKOCYTE ESTERASE, URINE, POC: ABNORMAL
NITRITE, URINE, POC: POSITIVE
PH, URINE, POC: 6 (ref 4.6–8)
PROTEIN,URINE, POC: 100
SPECIFIC GRAVITY, URINE, POC: 1.01 (ref 1–1.03)
URINALYSIS CLARITY, POC: ABNORMAL
URINALYSIS COLOR, POC: ABNORMAL
UROBILINOGEN, POC: 0.2

## 2022-09-16 PROCEDURE — G8420 CALC BMI NORM PARAMETERS: HCPCS | Performed by: UROLOGY

## 2022-09-16 PROCEDURE — 1123F ACP DISCUSS/DSCN MKR DOCD: CPT | Performed by: UROLOGY

## 2022-09-16 PROCEDURE — G8427 DOCREV CUR MEDS BY ELIG CLIN: HCPCS | Performed by: UROLOGY

## 2022-09-16 PROCEDURE — 99213 OFFICE O/P EST LOW 20 MIN: CPT | Performed by: UROLOGY

## 2022-09-16 PROCEDURE — 1036F TOBACCO NON-USER: CPT | Performed by: UROLOGY

## 2022-09-16 PROCEDURE — 81003 URINALYSIS AUTO W/O SCOPE: CPT | Performed by: UROLOGY

## 2022-09-16 NOTE — PROGRESS NOTES
[facility] Urology  [address] [facility]   [address], [address] [facility]  [address] [facility]  : 1940    Chief Complaint   Patient presents with    Bladder Cancer        HPI     Matthew Ovalles is a [de-identified] y.o. male Patient is status post radical cystoprostatectomy with ileal conduit on 2008. Preoperatively he had had stage [id number] cancer which had persisted after intravesical therapy. 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 Patient did extremely well postoperatively but developed small bowel  obstruction several weeks postop. He was treated with TPN at home. Had repair of parastomal hernia, bowel obstruction in . Had infection of the mesh, which was removed. Had repair of recurrent parastomal hernia in 3-13. He feels that his parastomal hernia has recurred. He does not want it treated. Was seen on 2-15-17 with c/o gross hematuria in his urostomy bag . Trevor Pillai Had CT A/P w/wo on 17: IMPRESSION:  1. Post-operative changes of cystectomy and right lower quadrant ostomy. There  is a large parastomal hernia containing transverse colon without bowel  obstruction. 2. Lower pole left renal calculus. 3. Cholelithiasis. . Urine cytology negative. Applied silver nitrate to stoma 3-2-17. I told him to take Vit c and appl vinegar to the stoma area. He has been doing well, no hematuria. Admitted 21 with urosepsis. CT urogram 21:    IMPRESSION  Moderately severe left hydroureteronephrosis with multiple, (at  least 5) small, 2-5 mm stones in tandem in the lower left ureter. Resting in the  left, status post cystectomy with a parastomal hernia. Had left nephrostomy tube placement 21. On 2021 had  Left antegrade ureteropyeloscopy, left antegrade pyelogram with interpretation of pyelogram.     FINDINGS:  No evidence of ureteral stones seen during flexible endoscopy of the left collecting system. Since then he has been maintained with left nephroureteral stent which is capped. He has appt in radiology on 2-22-22 for tube change. He is desirous of getting the tube removed. Currently has internal ureteral stent protruding from ileal conduit stoma. Cr 1.47 in 3-22. On 7-27-22 he had conversion of the nephroureteral catheter to double J catheter going from left kidney to stoma bag. Pt and radiologist decided to leave the stent in place.    Past Medical History:   Diagnosis Date    Acute encephalopathy 6/18/2021    Arthritis     OA- bilat knees    Bladder absent     stoma present    Bladder cancer (HCC)     CAD (coronary artery disease)     CABG x 4 in 2004 - no damage- emergenc CABG- Cath 1/2011- total occlusion ramus, total mid occlusion left anterior descending, 50-70% stenosis of mid right coronary artery- \"Normal LEF\"    Cancer ([facility] 1/1/2008    bladder- chemo in bladder then 7/11 bladder removed    CRI (chronic renal insufficiency), stage 3 (moderate) (HCC)     Depression     Family history of diabetes mellitus     Heart murmur     mild mitral regurgitation    Hypercholesteremia     Hypertension     Infection     to s/p hernia wound 7/0838    Other complication of colostomy or enterostomy     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    Followed by surgeon Dr Milton Solorio     Unspecified intestinal obstruction     Urinary tract infection 6/18/2021    Ventral hernia, unspecified, without mention of obstruction or gangrene      Past Surgical History:   Procedure Laterality Date    CARDIAC CATHETERIZATION  2011    COLONOSCOPY  1/07    repeat 10 years    HERNIA REPAIR  9/2011    9 days later emergency s/p hernia repair surg    HERNIA REPAIR  8/26/11    hernia repair    IR CHANGE URET TUBE VIA ILEAL CONDUIT  3/3/2022    IR NEPHROSTOGRAM EXISTING ACCESS  3/8/2022    IR NEPHROSTOMY CONVERT CATH TO NEPHROURETERAL CATH  8/3/2021    IR NEPHROSTOMY CONVERT CATH TO NEPHROURETERAL CATH  8/3/2021    IR NEPHROSTOMY CONVERT CATH TO NEPHROURETERAL CATH 8/3/2021 [facility]    IR NEPHROSTOMY EXCHANGE CATHETER  2021    IR NEPHROSTOMY EXCHANGE CATHETER  10/12/2021    IR NEPHROSTOMY EXCHANGE CATHETER  2021    IR NEPHROSTOMY PERCUTANEOUS LEFT  2021    IR NEPHROSTOMY PERCUTANEOUS LEFT  2021    IR NEPHROSTOMY PERCUTANEOUS LEFT 2021 [facility]    CT CARDIAC SURG PROCEDURE UNLIST      CABG x 4    REMV BLADDER,TOTAL  2008    ostomy    VASECTOMY  1974     Current Outpatient Medications   Medication Sig Dispense Refill    FLUoxetine (PROZAC) 20 MG capsule Take 1 capsule by mouth daily 90 capsule 1    aspirin 81 MG EC tablet Take 81 mg by mouth daily      losartan (COZAAR) 100 MG tablet Take 100 mg by mouth daily      simvastatin (ZOCOR) 80 MG tablet TAKE 1 TABLET AT BEDTIME       No current facility-administered medications for this visit. No Known Allergies  Social History     Socioeconomic History    Marital status:       Spouse name: Not on file    Number of children: Not on file    Years of education: Not on file    Highest education level: Not on file   Occupational History    Not on file   Tobacco Use    Smoking status: Never    Smokeless tobacco: Never   Substance and Sexual Activity    Alcohol use: No    Drug use: Never    Sexual activity: Not on file   Other Topics Concern    Not on file   Social History Narrative    Wife  2016, two sons     Social Determinants of Health     Financial Resource Strain: Not on file   Food Insecurity: Not on file   Transportation Needs: Not on file   Physical Activity: Not on file   Stress: Not on file   Social Connections: Not on file   Intimate Partner Violence: Not on file   Housing Stability: Not on file     Family History   Problem Relation Age of Onset    Heart Disease Father 37        MI    Hypertension Father     Heart Disease Sister     Osteoarthritis Sister     Cancer Mother         OVARY    Diabetes Sister Hypertension Sister        ROS    There were no vitals taken for this visit. Urinalysis  UA - Dipstick  @LASTPROCAMB([id number])@    UA - Micro  WBC - 0  RBC - 0  Bacteria - 0  Epith - 0    Physical Exam    Assessment and Plan     Diagnosis Orders   1. Malignant neoplasm of urinary bladder, unspecified site (HCC)  AMB POC URINALYSIS DIP STICK AUTO W/O MICRO             Orders Placed This Encounter   Procedures    AMB POC URINALYSIS DIP STICK AUTO W/O MICRO       Follow-up and Dispositions    Return in about 1 year (around 9/16/2023). Can  continue to have ureteral stent changed by IR. Return in about 1 year (around 9/16/2023).

## 2022-10-10 ENCOUNTER — OFFICE VISIT (OUTPATIENT)
Dept: CARDIOLOGY CLINIC | Age: 82
End: 2022-10-10
Payer: MEDICARE

## 2022-10-10 VITALS
WEIGHT: 170 LBS | HEART RATE: 76 BPM | SYSTOLIC BLOOD PRESSURE: 122 MMHG | DIASTOLIC BLOOD PRESSURE: 80 MMHG | HEIGHT: 70 IN | BODY MASS INDEX: 24.34 KG/M2

## 2022-10-10 DIAGNOSIS — R01.1 CARDIAC MURMUR, UNSPECIFIED: ICD-10-CM

## 2022-10-10 DIAGNOSIS — I48.3 TYPICAL ATRIAL FLUTTER (HCC): ICD-10-CM

## 2022-10-10 DIAGNOSIS — I25.810 ATHEROSCLEROSIS OF CORONARY ARTERY BYPASS GRAFT OF NATIVE HEART WITHOUT ANGINA PECTORIS: ICD-10-CM

## 2022-10-10 DIAGNOSIS — I10 ESSENTIAL (PRIMARY) HYPERTENSION: Primary | ICD-10-CM

## 2022-10-10 DIAGNOSIS — E78.2 MIXED HYPERLIPIDEMIA: ICD-10-CM

## 2022-10-10 PROCEDURE — G8428 CUR MEDS NOT DOCUMENT: HCPCS | Performed by: INTERNAL MEDICINE

## 2022-10-10 PROCEDURE — G8484 FLU IMMUNIZE NO ADMIN: HCPCS | Performed by: INTERNAL MEDICINE

## 2022-10-10 PROCEDURE — 99214 OFFICE O/P EST MOD 30 MIN: CPT | Performed by: INTERNAL MEDICINE

## 2022-10-10 PROCEDURE — G8420 CALC BMI NORM PARAMETERS: HCPCS | Performed by: INTERNAL MEDICINE

## 2022-10-10 PROCEDURE — 1036F TOBACCO NON-USER: CPT | Performed by: INTERNAL MEDICINE

## 2022-10-10 PROCEDURE — 93000 ELECTROCARDIOGRAM COMPLETE: CPT | Performed by: INTERNAL MEDICINE

## 2022-10-10 PROCEDURE — 1123F ACP DISCUSS/DSCN MKR DOCD: CPT | Performed by: INTERNAL MEDICINE

## 2022-10-10 RX ORDER — SIMVASTATIN 80 MG
80 TABLET ORAL NIGHTLY
Qty: 90 TABLET | Refills: 3 | Status: SHIPPED | OUTPATIENT
Start: 2022-10-10

## 2022-10-10 RX ORDER — LOSARTAN POTASSIUM 100 MG/1
100 TABLET ORAL DAILY
Qty: 90 TABLET | Refills: 3 | Status: SHIPPED | OUTPATIENT
Start: 2022-10-10

## 2022-10-10 ASSESSMENT — ENCOUNTER SYMPTOMS
NAIL CHANGES: 0
ABDOMINAL PAIN: 0
COUGH: 0
EYE PAIN: 0
STRIDOR: 0
APHONIA: 0

## 2022-10-10 NOTE — PROGRESS NOTES
[address], [address] [place] [address], [address] [place] [address]  PHONE: [phone]    SUBJECTIVE:   Radha Villalobos is a [de-identified] y.o. male 1940   seen for a follow up visit regarding the following:     Chief Complaint   Patient presents with    Coronary Artery Disease         History of present illness: [de-identified] y.o. male presented for follow-up 10/10/22  aflutter detected on EKG today no sx. Interval history:  The patient presented for consultation 09/08/2020. Former history of coronary artery disease with coronary artery bypass grafting 2004 with subsequent cardiac catheterization 2011. Has urostomy  bag in place here with care giver. The patient presented for consultation 09/08/2020. The patient was formerly seen by Dr. Sergey Casiano and subsequently by Dr. Kasia Petit at [facility]. The patient was lost to followup for several years and presented 09/2020 to establish care. He  stated he has a history of a murmur. No shortness of breath, chest discomfort. Cardiac History:     Coronary artery bypass grafting 2004 LIMA to LAD, SVG to ramus, SVG to D3, SVG to D1. Cardiac catheterization 2011, left main free of significant disease, circumflex relatively small vessel, marginal branch. No significant stenosis. Native coronary LAD vessels occluded with patent bypass grafts, status post bypass graft anatomy described  above. Nonobstructive disease noted in right coronary artery with severe distal vessel disease, medical management. 09/08/2020 ECG - sinus rhythm, occasional PACs, nonspecific ST depression, rate 91 beats per minute.       3/2021 echocardiogram mild mitral regurgitation no aortic regurgitation or aortic stenosis  10/10/2022 EKG atrial flutter      Assessment and Plan:    Atrial flutter   Plan for EP consult   Discussed anticoagulation  Plan for discussion for a flutter ablation with placement of loop recorder to evaluate for A. fib   Initiate anticoagulation for now   mitral regurgitation   mild by echocardiogram 2021    Coronary disease, controlled. Patent coronary artery bypass grafting in 2011, appears to have small vessel disease with small LAD. Chronic kidney disease, controlled. Estimated Creatinine Clearance: 36 mL/min (A) (based on SCr of 1.7 mg/dL (H)). Hyperlipidemia. Not controlled. simvastatin - 80 MG      Renal dosing for Apixaban:  CR less than 1.5: 2.5 mg twice daily if also 80 years or older and weight less than 60 kg; creatinine greater than 1.5: 2.5 mg twice daily if also at least 1 of the following 80 years or older, weight less than 60 kg; CR CL less than 15, not defined     Current Outpatient Medications   Medication Sig    losartan (COZAAR) 100 MG tablet Take 1 tablet by mouth daily    simvastatin (ZOCOR) 80 MG tablet Take 1 tablet by mouth nightly TAKE 1 TABLET AT BEDTIME    apixaban (ELIQUIS) 2.5 MG TABS tablet Take 1 tablet by mouth 2 times daily    FLUoxetine (PROZAC) 20 MG capsule Take 1 capsule by mouth daily    aspirin 81 MG EC tablet Take 81 mg by mouth daily     No current facility-administered medications for this visit. Past Medical History, Past Surgical History, Family history, Social History, and Medications were all reviewed with the patient today and updated as necessary.        No Known Allergies  Past Medical History:   Diagnosis Date    Acute encephalopathy 6/18/2021    Arthritis     OA- bilat knees    Bladder absent     stoma present    Bladder cancer (HCC)     CAD (coronary artery disease)     CABG x 4 in 2004 - no damage- emergenc CABG- Cath 1/2011- total occlusion ramus, total mid occlusion left anterior descending, 50-70% stenosis of mid right coronary artery- \"Normal LEF\"    Cancer ([facility] [address] 1/1/2008    bladder- chemo in bladder then 7/11 bladder removed    CRI (chronic renal insufficiency), stage 3 (moderate) (HCC)     Depression     Family history of diabetes mellitus Heart murmur     mild mitral regurgitation    Hypercholesteremia     Hypertension     Infection     to s/p hernia wound 4/4289    Other complication of colostomy or enterostomy     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    Followed by surgeon Dr Katy Johnson     Unspecified intestinal obstruction     Urinary tract infection 6/18/2021    Ventral hernia, unspecified, without mention of obstruction or gangrene      Past Surgical History:   Procedure Laterality Date    CARDIAC CATHETERIZATION  2011    COLONOSCOPY  1/07    repeat 10 years    HERNIA REPAIR  9/2011    9 days later emergency s/p hernia repair surg    HERNIA REPAIR  8/26/11    hernia repair    IR CHANGE URET TUBE VIA ILEAL CONDUIT  3/3/2022    IR NEPHROSTOGRAM EXISTING ACCESS  3/8/2022    IR NEPHROSTOMY CONVERT CATH TO NEPHROURETERAL CATH  8/3/2021    IR NEPHROSTOMY CONVERT CATH TO NEPHROURETERAL CATH  8/3/2021    IR NEPHROSTOMY CONVERT CATH TO NEPHROURETERAL CATH 8/3/2021 [facility]    IR NEPHROSTOMY EXCHANGE CATHETER  8/6/2021    IR NEPHROSTOMY EXCHANGE CATHETER  10/12/2021    IR NEPHROSTOMY EXCHANGE CATHETER  11/22/2021    IR NEPHROSTOMY PERCUTANEOUS LEFT  6/21/2021    IR NEPHROSTOMY PERCUTANEOUS LEFT  6/21/2021    IR NEPHROSTOMY PERCUTANEOUS LEFT 6/21/2021 [facility]    DC CARDIAC SURG PROCEDURE UNLIST  2004    CABG x 4    REMV BLADDER,TOTAL  7/2008    ostomy    VASECTOMY  1974     Family History   Problem Relation Age of Onset    Heart Disease Father 37        MI    Hypertension Father     Heart Disease Sister     Osteoarthritis Sister     Cancer Mother         OVARY    Diabetes Sister     Hypertension Sister       Social History     Tobacco Use    Smoking status: Never    Smokeless tobacco: Never   Substance Use Topics    Alcohol use: No       ROS:    Review of Systems   Constitutional: Negative for fever. HENT:  Negative for stridor. Eyes:  Negative for pain. Cardiovascular:  Negative for chest pain.    Respiratory: Negative for cough. Endocrine: Negative for cold intolerance. Skin:  Negative for nail changes. Musculoskeletal:  Negative for arthritis. Gastrointestinal:  Negative for abdominal pain. Genitourinary:  Negative for dysuria. Neurological:  Negative for aphonia. Psychiatric/Behavioral:  Negative for altered mental status. Allergic/Immunologic: Negative for hives. PHYSICAL EXAM:    /80   Pulse 76   Ht 5' 10\" (1.778 m)   Wt 170 lb (77.1 kg)   BMI 24.39 kg/m²        Wt Readings from Last 3 Encounters:   10/10/22 170 lb (77.1 kg)   08/17/22 162 lb (73.5 kg)   07/28/22 163 lb (73.9 kg)     BP Readings from Last 3 Encounters:   10/10/22 122/80   08/17/22 119/80   07/27/22 114/76         Physical Exam  Vitals reviewed. HENT:      Head: Normocephalic. Right Ear: External ear normal.      Left Ear: External ear normal.      Nose: Nose normal.   Eyes:      General: No scleral icterus. Pulmonary:      Effort: Pulmonary effort is normal.   Abdominal:      General: There is no distension. Comments: Urostomy. Musculoskeletal:      Cervical back: Neck supple. Skin:     General: Skin is warm. Neurological:      Mental Status: He is alert. Mental status is at baseline. Medical problems and test results were reviewed with the patient today.            Recent Results (from the past 672 hour(s))   AMB POC URINALYSIS DIP STICK AUTO W/O MICRO    Collection Time: 09/16/22  3:10 PM   Result Value Ref Range    Color, Urine, POC      Clarity, Urine, POC      Glucose, Urine, POC Negative Negative    Bilirubin, Urine, POC Negative Negative    Ketones, Urine, POC Negative Negative    Specific Gravity, Urine, POC 1.015 1.001 - 1.035    Blood, Urine, POC Large Negative    pH, Urine, POC 6.0 4.6 - 8.0    Protein, Urine,  Negative    Urobilinogen, POC 0.2     Nitrate, Urine, POC Positive Negative    Leukocyte Esterase, Urine, POC Large Negative     Lab Results   Component Value Date/Time    CHOL 147 08/17/2022 11:21 AM    HDL 49 08/17/2022 11:21 AM    VLDL 19 09/07/2021 10:23 AM       No results found for any visits on 10/10/22. Ravinder Arroyo was seen today for coronary artery disease. Diagnoses and all orders for this visit:    Essential (primary) hypertension  -     EKG 12 Lead    Atherosclerosis of coronary artery bypass graft of native heart without angina pectoris  -     EKG 12 Lead    Mixed hyperlipidemia  -     EKG 12 Lead    Cardiac murmur, unspecified    Typical atrial flutter (HCC)  -     Rishi Galeas MD, Cardiac Electrophysiology, [place]    Other orders  -     losartan (COZAAR) 100 MG tablet; Take 1 tablet by mouth daily  -     simvastatin (ZOCOR) 80 MG tablet; Take 1 tablet by mouth nightly TAKE 1 TABLET AT BEDTIME  -     apixaban (ELIQUIS) 2.5 MG TABS tablet; Take 1 tablet by mouth 2 times daily    Return in about 6 months (around 4/10/2023).        Karen Whiting MD  10/10/2022  10:18 AM

## 2022-10-18 NOTE — TELEPHONE ENCOUNTER
Spoke with pharmacist at [facility]. Gave verbal order with codes for free 30 day trial. [facility] has to order Eliquis 2.5[facility]. Spoke with patient. Informed patient that [facility] has to order 2.5mg and it will be there tomorrow. Patient will  samples at [facility] [place] office today.

## 2022-10-18 NOTE — TELEPHONE ENCOUNTER
Patient called stated he was in on 10/10/22 to see  and he was given Eliquis 2.5 mg samples. Patient said he took his last one last night and didn't know if  wanted him to continue on this medicine and if so a RX needs to be sent to [address]. Please review and follow up.  Thanks

## 2022-10-18 NOTE — TELEPHONE ENCOUNTER
Spoke with patient. Informed patient Rx has been sent to [facility] and we will send in a 30 day supply to [facility] until his prescription from [place] [id number] comes in. Rx sent to  for signature.

## 2022-10-20 ENCOUNTER — TELEPHONE (OUTPATIENT)
Dept: CARDIOLOGY CLINIC | Age: 82
End: 2022-10-20

## 2022-10-20 NOTE — TELEPHONE ENCOUNTER
Physician or Practice Requesting: [facility]  : Richardson Solorzano Phone Number: [phone]  Fax Number: [phone]  Date of Surgery/Procedure: 10-27-22  Type of Surgery or Procedure: Nephrostomy tube exchange  Type of Anesthesia: none  Type of Clearance Requested: Medication Hold Only  Medication to Hold: Eliquis  Days to Hold: 48 hr

## 2022-10-26 ENCOUNTER — TELEPHONE (OUTPATIENT)
Dept: CARDIOLOGY CLINIC | Age: 82
End: 2022-10-26

## 2022-10-26 NOTE — TELEPHONE ENCOUNTER
Pt's caregiver called on behalf of the pt. States [employer] won't cover the pt's Eliquis until January and the pt can't afford the medication until then. Pt would like to be prescribed another medication, if possible.

## 2022-10-27 ENCOUNTER — HOSPITAL ENCOUNTER (OUTPATIENT)
Dept: INTERVENTIONAL RADIOLOGY/VASCULAR | Age: 82
Discharge: HOME | End: 2022-10-30
Payer: MEDICARE

## 2022-10-27 VITALS
DIASTOLIC BLOOD PRESSURE: 61 MMHG | SYSTOLIC BLOOD PRESSURE: 109 MMHG | RESPIRATION RATE: 16 BRPM | HEART RATE: 67 BPM | TEMPERATURE: 98.6 F | OXYGEN SATURATION: 95 %

## 2022-10-27 DIAGNOSIS — N18.9 CHRONIC KIDNEY DISEASE, UNSPECIFIED CKD STAGE: ICD-10-CM

## 2022-10-27 PROCEDURE — 6370000000 HC RX 637 (ALT 250 FOR IP): Performed by: RADIOLOGY

## 2022-10-27 PROCEDURE — 50435 EXCHANGE NEPHROSTOMY CATH: CPT

## 2022-10-27 PROCEDURE — 6360000004 HC RX CONTRAST MEDICATION: Performed by: RADIOLOGY

## 2022-10-27 RX ORDER — LIDOCAINE HYDROCHLORIDE 20 MG/ML
SOLUTION OROPHARYNGEAL
Status: COMPLETED | OUTPATIENT
Start: 2022-10-27 | End: 2022-10-27

## 2022-10-27 RX ORDER — DIAZEPAM 2 MG/1
TABLET ORAL
Status: COMPLETED | OUTPATIENT
Start: 2022-10-27 | End: 2022-10-27

## 2022-10-27 RX ORDER — OXYCODONE HYDROCHLORIDE 5 MG/1
TABLET ORAL
Status: COMPLETED | OUTPATIENT
Start: 2022-10-27 | End: 2022-10-27

## 2022-10-27 RX ADMIN — OXYCODONE 10 MG: 5 TABLET ORAL at 11:03

## 2022-10-27 RX ADMIN — IOPAMIDOL 10 ML: 612 INJECTION, SOLUTION INTRAVENOUS at 11:22

## 2022-10-27 RX ADMIN — LIDOCAINE HYDROCHLORIDE 15 ML: 20 SOLUTION ORAL at 11:18

## 2022-10-27 RX ADMIN — DIAZEPAM 10 MG: 2 TABLET ORAL at 11:03

## 2022-10-27 NOTE — DISCHARGE INSTRUCTIONS
If you have any questions about your procedure, please call the Interventional Radiology department at [phone]. After business hours (5pm) and weekends, call the answering service at [phone] and ask for the Radiologist on call to be paged. Si tiene Preguntas acerca del procedimiento, por favor llame al departamento de Radiología Intervencional al [phone]. Después de horas de oficina (5 pm) y los fines de [place], llamar al Liya Nancy Maria al [phone] y pregunte por el Radiologo de [place]. Interventional Radiology General Nurse Discharge    After general anesthesia or intravenous sedation, for 24 hours or while taking prescription Narcotics:  Limit your activities  Do not drive and operate hazardous machinery  Do not make important personal or business decisions  Do  not drink alcoholic beverages  If you have not urinated within 8 hours after discharge, please contact your surgeon on call. * Please give a list of your current medications to your Primary Care Provider. * Please update this list whenever your medications are discontinued, doses are     changed, or new medications (including over-the-counter products) are added. * Please carry medication information at all times in case of emergency situations. These are general instructions for a healthy lifestyle:    No smoking/ No tobacco products/ Avoid exposure to second hand smoke  Surgeon General's Warning:  Quitting smoking now greatly reduces serious risk to your health.     Obesity, smoking, and sedentary lifestyle greatly increases your risk for illness  A healthy diet, regular physical exercise & weight monitoring are important for maintaining a healthy lifestyle    You may be retaining fluid if you have a history of heart failure or if you experience any of the following symptoms:  Weight gain of 3 pounds or more overnight or 5 pounds in a week, increased swelling in our hands or feet or shortness of breath while lying flat in bed. Please call your doctor as soon as you notice any of these symptoms; do not wait until your next office visit. Recognize signs and symptoms of STROKE:  F-face looks uneven    A-arms unable to move or move unevenly    S-speech slurred or non-existent    T-time-call 911 as soon as signs and symptoms begin-DO NOT go       Back to bed or wait to see if you get better-TIME IS BRAIN.

## 2022-10-27 NOTE — OR NURSING
TRANSFER - OUT REPORT:           Verbal report given to Delia Christ on Dois Albino  being transferred to IR room 6 for routine post-op              Report consisted of patients Situation, Background, Assessment and      Recommendations(SBAR). Information from the following report(s) SBAR, Procedure Summary and MAR was reviewed with the receiving nurse. Opportunity for questions and clarification was provided. Pt tolerated procedure well.              VITALS:  /83   Pulse 67   Temp 98.6 °F (37 °C) (Oral)   Resp 16   SpO2 100%

## 2022-11-21 ENCOUNTER — OFFICE VISIT (OUTPATIENT)
Dept: INTERNAL MEDICINE CLINIC | Facility: CLINIC | Age: 82
End: 2022-11-21
Payer: MEDICARE

## 2022-11-21 VITALS
OXYGEN SATURATION: 97 % | SYSTOLIC BLOOD PRESSURE: 119 MMHG | TEMPERATURE: 98.1 F | DIASTOLIC BLOOD PRESSURE: 67 MMHG | HEIGHT: 70 IN | HEART RATE: 68 BPM | BODY MASS INDEX: 24.34 KG/M2 | WEIGHT: 170 LBS

## 2022-11-21 DIAGNOSIS — R68.89 FORGETFULNESS: ICD-10-CM

## 2022-11-21 DIAGNOSIS — I10 ESSENTIAL HYPERTENSION: ICD-10-CM

## 2022-11-21 DIAGNOSIS — R73.03 PREDIABETES: ICD-10-CM

## 2022-11-21 DIAGNOSIS — N18.31 STAGE 3A CHRONIC KIDNEY DISEASE (HCC): ICD-10-CM

## 2022-11-21 DIAGNOSIS — Z23 ENCOUNTER FOR IMMUNIZATION: ICD-10-CM

## 2022-11-21 DIAGNOSIS — E78.2 MIXED HYPERLIPIDEMIA: ICD-10-CM

## 2022-11-21 DIAGNOSIS — I25.810 CORONARY ARTERY DISEASE INVOLVING CORONARY BYPASS GRAFT OF NATIVE HEART WITHOUT ANGINA PECTORIS: ICD-10-CM

## 2022-11-21 DIAGNOSIS — Z00.00 MEDICARE ANNUAL WELLNESS VISIT, SUBSEQUENT: Primary | ICD-10-CM

## 2022-11-21 DIAGNOSIS — F32.5 MAJOR DEPRESSIVE DISORDER WITH SINGLE EPISODE, IN FULL REMISSION (HCC): ICD-10-CM

## 2022-11-21 LAB
ALBUMIN SERPL-MCNC: 4.1 G/DL (ref 3.2–4.6)
ALBUMIN/GLOB SERPL: 1.4 {RATIO} (ref 0.4–1.6)
ALP SERPL-CCNC: 54 U/L (ref 50–136)
ALT SERPL-CCNC: 29 U/L (ref 12–65)
ANION GAP SERPL CALC-SCNC: 6 MMOL/L (ref 2–11)
AST SERPL-CCNC: 20 U/L (ref 15–37)
BASOPHILS # BLD: 0.1 K/UL (ref 0–0.2)
BASOPHILS NFR BLD: 1 % (ref 0–2)
BILIRUB DIRECT SERPL-MCNC: 0.2 MG/DL
BILIRUB SERPL-MCNC: 0.7 MG/DL (ref 0.2–1.1)
BUN SERPL-MCNC: 19 MG/DL (ref 8–23)
CALCIUM SERPL-MCNC: 9.4 MG/DL (ref 8.3–10.4)
CHLORIDE SERPL-SCNC: 107 MMOL/L (ref 101–110)
CO2 SERPL-SCNC: 27 MMOL/L (ref 21–32)
CREAT SERPL-MCNC: 1.7 MG/DL (ref 0.8–1.5)
DIFFERENTIAL METHOD BLD: ABNORMAL
EOSINOPHIL # BLD: 0.2 K/UL (ref 0–0.8)
EOSINOPHIL NFR BLD: 4 % (ref 0.5–7.8)
ERYTHROCYTE [DISTWIDTH] IN BLOOD BY AUTOMATED COUNT: 13.4 % (ref 11.9–14.6)
GLOBULIN SER CALC-MCNC: 2.9 G/DL (ref 2.8–4.5)
GLUCOSE SERPL-MCNC: 116 MG/DL (ref 65–100)
HCT VFR BLD AUTO: 38.6 % (ref 41.1–50.3)
HGB BLD-MCNC: 12.8 G/DL (ref 13.6–17.2)
IMM GRANULOCYTES # BLD AUTO: 0 K/UL (ref 0–0.5)
IMM GRANULOCYTES NFR BLD AUTO: 0 % (ref 0–5)
LYMPHOCYTES # BLD: 1.8 K/UL (ref 0.5–4.6)
LYMPHOCYTES NFR BLD: 28 % (ref 13–44)
MCH RBC QN AUTO: 31.5 PG (ref 26.1–32.9)
MCHC RBC AUTO-ENTMCNC: 33.2 G/DL (ref 31.4–35)
MCV RBC AUTO: 95.1 FL (ref 82–102)
MONOCYTES # BLD: 0.5 K/UL (ref 0.1–1.3)
MONOCYTES NFR BLD: 9 % (ref 4–12)
NEUTS SEG # BLD: 3.6 K/UL (ref 1.7–8.2)
NEUTS SEG NFR BLD: 58 % (ref 43–78)
NRBC # BLD: 0 K/UL (ref 0–0.2)
PLATELET # BLD AUTO: 159 K/UL (ref 150–450)
PMV BLD AUTO: 10 FL (ref 9.4–12.3)
POTASSIUM SERPL-SCNC: 4.7 MMOL/L (ref 3.5–5.1)
PROT SERPL-MCNC: 7 G/DL (ref 6.3–8.2)
RBC # BLD AUTO: 4.06 M/UL (ref 4.23–5.6)
SODIUM SERPL-SCNC: 140 MMOL/L (ref 133–143)
VIT B12 SERPL-MCNC: 453 PG/ML (ref 193–986)
WBC # BLD AUTO: 6.2 K/UL (ref 4.3–11.1)

## 2022-11-21 PROCEDURE — 1036F TOBACCO NON-USER: CPT | Performed by: INTERNAL MEDICINE

## 2022-11-21 PROCEDURE — 90694 VACC AIIV4 NO PRSRV 0.5ML IM: CPT | Performed by: INTERNAL MEDICINE

## 2022-11-21 PROCEDURE — G8427 DOCREV CUR MEDS BY ELIG CLIN: HCPCS | Performed by: INTERNAL MEDICINE

## 2022-11-21 PROCEDURE — G8420 CALC BMI NORM PARAMETERS: HCPCS | Performed by: INTERNAL MEDICINE

## 2022-11-21 PROCEDURE — G0439 PPPS, SUBSEQ VISIT: HCPCS | Performed by: INTERNAL MEDICINE

## 2022-11-21 PROCEDURE — 99214 OFFICE O/P EST MOD 30 MIN: CPT | Performed by: INTERNAL MEDICINE

## 2022-11-21 PROCEDURE — 3074F SYST BP LT 130 MM HG: CPT | Performed by: INTERNAL MEDICINE

## 2022-11-21 PROCEDURE — G8484 FLU IMMUNIZE NO ADMIN: HCPCS | Performed by: INTERNAL MEDICINE

## 2022-11-21 PROCEDURE — G0008 ADMIN INFLUENZA VIRUS VAC: HCPCS | Performed by: INTERNAL MEDICINE

## 2022-11-21 PROCEDURE — 3078F DIAST BP <80 MM HG: CPT | Performed by: INTERNAL MEDICINE

## 2022-11-21 PROCEDURE — 1123F ACP DISCUSS/DSCN MKR DOCD: CPT | Performed by: INTERNAL MEDICINE

## 2022-11-21 RX ORDER — FLUOXETINE HYDROCHLORIDE 20 MG/1
20 CAPSULE ORAL DAILY
Qty: 90 CAPSULE | Refills: 1 | Status: SHIPPED | OUTPATIENT
Start: 2022-11-21

## 2022-11-21 ASSESSMENT — ANXIETY QUESTIONNAIRES
5. BEING SO RESTLESS THAT IT IS HARD TO SIT STILL: 0
IF YOU CHECKED OFF ANY PROBLEMS ON THIS QUESTIONNAIRE, HOW DIFFICULT HAVE THESE PROBLEMS MADE IT FOR YOU TO DO YOUR WORK, TAKE CARE OF THINGS AT HOME, OR GET ALONG WITH OTHER PEOPLE: NOT DIFFICULT AT ALL
6. BECOMING EASILY ANNOYED OR IRRITABLE: 0
2. NOT BEING ABLE TO STOP OR CONTROL WORRYING: 0
1. FEELING NERVOUS, ANXIOUS, OR ON EDGE: 0
3. WORRYING TOO MUCH ABOUT DIFFERENT THINGS: 0
7. FEELING AFRAID AS IF SOMETHING AWFUL MIGHT HAPPEN: 0
4. TROUBLE RELAXING: 0
GAD7 TOTAL SCORE: 0

## 2022-11-21 ASSESSMENT — PATIENT HEALTH QUESTIONNAIRE - PHQ9
6. FEELING BAD ABOUT YOURSELF - OR THAT YOU ARE A FAILURE OR HAVE LET YOURSELF OR YOUR FAMILY DOWN: 0
SUM OF ALL RESPONSES TO PHQ QUESTIONS 1-9: 0
1. LITTLE INTEREST OR PLEASURE IN DOING THINGS: 0
4. FEELING TIRED OR HAVING LITTLE ENERGY: 0
5. POOR APPETITE OR OVEREATING: 0
3. TROUBLE FALLING OR STAYING ASLEEP: 0
SUM OF ALL RESPONSES TO PHQ QUESTIONS 1-9: 0
SUM OF ALL RESPONSES TO PHQ9 QUESTIONS 1 & 2: 0
7. TROUBLE CONCENTRATING ON THINGS, SUCH AS READING THE NEWSPAPER OR WATCHING TELEVISION: 0
SUM OF ALL RESPONSES TO PHQ QUESTIONS 1-9: 0
SUM OF ALL RESPONSES TO PHQ QUESTIONS 1-9: 0
2. FEELING DOWN, DEPRESSED OR HOPELESS: 0
8. MOVING OR SPEAKING SO SLOWLY THAT OTHER PEOPLE COULD HAVE NOTICED. OR THE OPPOSITE, BEING SO FIGETY OR RESTLESS THAT YOU HAVE BEEN MOVING AROUND A LOT MORE THAN USUAL: 0
10. IF YOU CHECKED OFF ANY PROBLEMS, HOW DIFFICULT HAVE THESE PROBLEMS MADE IT FOR YOU TO DO YOUR WORK, TAKE CARE OF THINGS AT HOME, OR GET ALONG WITH OTHER PEOPLE: 0
9. THOUGHTS THAT YOU WOULD BE BETTER OFF DEAD, OR OF HURTING YOURSELF: 0

## 2022-11-21 ASSESSMENT — ENCOUNTER SYMPTOMS
SHORTNESS OF BREATH: 0
BLOOD IN STOOL: 0

## 2022-11-21 NOTE — PATIENT INSTRUCTIONS
Personalized Preventive Plan for Kareen Espinosa - 11/21/2022  Medicare offers a range of preventive health benefits. Some of the tests and screenings are paid in full while other may be subject to a deductible, co-insurance, and/or copay. Some of these benefits include a comprehensive review of your medical history including lifestyle, illnesses that may run in your family, and various assessments and screenings as appropriate. After reviewing your medical record and screening and assessments performed today your provider may have ordered immunizations, labs, imaging, and/or referrals for you. A list of these orders (if applicable) as well as your Preventive Care list are included within your After Visit Summary for your review. Other Preventive Recommendations:    A preventive eye exam performed by an eye specialist is recommended every 1-2 years to screen for glaucoma; cataracts, macular degeneration, and other eye disorders. A preventive dental visit is recommended every 6 months. Try to get at least 150 minutes of exercise per week or 10,000 steps per day on a pedometer . Order or download the FREE \"Exercise & Physical Activity: Your Everyday Guide\" from [employer]. Call 1-471.340.3726 or search [employer] online. You need 1209-9093 mg of calcium and 7684-2806 IU of vitamin D per day. It is possible to meet your calcium requirement with diet alone, but a vitamin D supplement is usually necessary to meet this goal.  When exposed to the sun, use a sunscreen that protects against both UVA and UVB radiation with an SPF of 30 or greater. Reapply every 2 to 3 hours or after sweating, drying off with a towel, or swimming. Always wear a seat belt when traveling in a car. Always wear a helmet when riding a bicycle or motorcycle.

## 2022-11-21 NOTE — PROGRESS NOTES
Rere Reeves M.D. Internal Medicine  [facility]  [address], [address]  Phone: [phone]  Fax: [phone]    Hypertension  This is a chronic problem. The current episode started more than 1 year ago. The problem has been waxing and waning since onset. The problem is controlled. Pertinent negatives include no chest pain or shortness of breath. There are no associated agents to hypertension. Risk factors for coronary artery disease include male gender and dyslipidemia. Past treatments include angiotensin blockers. The current treatment provides moderate improvement. There are no compliance problems. Memory Loss    Patient reports onset of memory loss was last month. Onset quality is gradual.     Symptoms associated with memory loss include changes in short-term memory. Apolinar Menjivar is a [de-identified] y.o. White (non-) male. Current Outpatient Medications   Medication Sig Dispense Refill    apixaban (ELIQUIS) 2.5 MG TABS tablet Take 1 tablet by mouth 2 times daily FREE 30 DAY TRIAL OFFER  RxBin [id number]  RxPCN 1016  [facility] [id number]   [phone] (Patient taking differently: Take 5 mg by mouth 2 times daily FREE 30 DAY TRIAL OFFER  RxBin [id number]  RxPCN 1016  [facility] [id number]   [phone]) 60 tablet 0    losartan (COZAAR) 100 MG tablet Take 1 tablet by mouth daily 90 tablet 3    simvastatin (ZOCOR) 80 MG tablet Take 1 tablet by mouth nightly TAKE 1 TABLET AT BEDTIME 90 tablet 3    FLUoxetine (PROZAC) 20 MG capsule Take 1 capsule by mouth daily 90 capsule 1    aspirin 81 MG EC tablet Take 81 mg by mouth daily       No current facility-administered medications for this visit.      No Known Allergies    Past Medical History:   Diagnosis Date    Acute encephalopathy 6/18/2021    Arthritis     OA- bilat knees    Bladder absent     stoma present    Bladder cancer (HCC)     CAD (coronary artery disease)     CABG x 4 in 2004 - no damage- emergenc CABG- Cath 1/2011- total occlusion ramus, total mid occlusion left anterior descending, 50-70% stenosis of mid right coronary artery- \"Normal LEF\"    Cancer ([facility] [address] 1/1/2008    bladder- chemo in bladder then 7/11 bladder removed    CRI (chronic renal insufficiency), stage 3 (moderate) (HCC)     Depression     Family history of diabetes mellitus     Heart murmur     mild mitral regurgitation    Hypercholesteremia     Hypertension     Infection     to s/p hernia wound 8/1814    Other complication of colostomy or enterostomy     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    Followed by surgeon Dr Graham Zhu     Unspecified intestinal obstruction     Urinary tract infection 6/18/2021    Ventral hernia, unspecified, without mention of obstruction or gangrene      Past Surgical History:   Procedure Laterality Date    CARDIAC CATHETERIZATION  2011    COLONOSCOPY  1/07    repeat 10 years    HERNIA REPAIR  9/2011    9 days later emergency s/p hernia repair surg    HERNIA REPAIR  8/26/11    hernia repair    IR CHANGE URET TUBE VIA ILEAL CONDUIT  3/3/2022    IR NEPHROSTOGRAM EXISTING ACCESS  3/8/2022    IR NEPHROSTOMY CONVERT CATH TO NEPHROURETERAL CATH  8/3/2021    IR NEPHROSTOMY CONVERT CATH TO NEPHROURETERAL CATH  8/3/2021    IR NEPHROSTOMY CONVERT CATH TO NEPHROURETERAL CATH 8/3/2021 [facility]    IR NEPHROSTOMY EXCHANGE CATHETER  8/6/2021    IR NEPHROSTOMY EXCHANGE CATHETER  10/12/2021    IR NEPHROSTOMY EXCHANGE CATHETER  11/22/2021    IR NEPHROSTOMY PERCUTANEOUS LEFT  6/21/2021    IR NEPHROSTOMY PERCUTANEOUS LEFT  6/21/2021    IR NEPHROSTOMY PERCUTANEOUS LEFT 6/21/2021 [facility] SPE[facility]    OR CARDIAC SURG PROCEDURE UNLIST  2004    CABG x 4    REMV BLADDER,TOTAL  7/2008    ostomy    VASECTOMY  1974     Social History     Tobacco Use    Smoking status: Never    Smokeless tobacco: Never   Substance Use Topics    Alcohol use: No    Drug use: Never     Family History   Problem Relation Age of Onset    Heart Disease Father 37        MI Hypertension Father     Heart Disease Sister     Osteoarthritis Sister     Cancer Mother         OVARY    Diabetes Sister     Hypertension Sister       Review of Systems   Respiratory:  Negative for shortness of breath. Cardiovascular:  Negative for chest pain. Gastrointestinal:  Negative for blood in stool. Psychiatric/Behavioral:  Positive for memory loss. Negative for self-injury and suicidal ideas. Physical Exam  Vitals and nursing note reviewed. Constitutional:       General: He is not in acute distress. Appearance: Normal appearance. He is normal weight. He is not ill-appearing, toxic-appearing or diaphoretic. HENT:      Head: Normocephalic and atraumatic. Right Ear: External ear normal.      Left Ear: External ear normal.   Eyes:      General: No scleral icterus. Right eye: No discharge. Left eye: No discharge. Conjunctiva/sclera: Conjunctivae normal.   Cardiovascular:      Rate and Rhythm: Normal rate and regular rhythm. Heart sounds: Normal heart sounds. No murmur heard. No friction rub. No gallop. Pulmonary:      Effort: Pulmonary effort is normal. No respiratory distress. Breath sounds: Normal breath sounds. No stridor. No wheezing, rhonchi or rales. Abdominal:      General: Abdomen is flat. Bowel sounds are normal. There is no distension. Palpations: Abdomen is soft. There is no mass. Tenderness: There is no abdominal tenderness. There is no guarding or rebound. Musculoskeletal:      Right lower leg: No edema. Left lower leg: No edema. Skin:     General: Skin is warm and dry. Coloration: Skin is not jaundiced or pale. Findings: No erythema. Neurological:      General: No focal deficit present. Mental Status: He is alert and oriented to person, place, and time. Mental status is at baseline.    Psychiatric:         Mood and Affect: Mood normal.         Behavior: Behavior normal.         Thought Content: Thought content normal.         Judgment: Judgment normal.      ASSESSMENT AND PLAN:    Forgetfulness (Undiagnosed new problem with uncertain prognosis): Needs labs. CAD: CABG x 4 in 2004. Follows with Cardiology (Dr. Carroll Kerns). Maintained on Asa. Risk factors medically modified per below. Taking medications w/o problems. Well controlled w/o angina or JORDAN. Continue Asa (No bleeding). Continue risk factor modification per below. Follow up with Dr. Carroll Kerns.   HTN: Taking current regimen (Losartan 100) w/o problems. Home BPs = Not being checked. Well controlled. Continue current regimen. Asked to monitor BP at home, call me if it runs above 140/80, and bring in a log next time. HLD: Taking current regimen (Zocor 80) w/o problems. Well controlled. Continue current regimen. FLPs:  9/2/20 on Zocor 80 = [132/71/48/64]. 8/17/22 on Zocor 80 = [147/77.4/49/103]. Prediabetes: Stable off medications. Continue TLCs. A1C's:  12/2/20 = 6.4.  8/17/22 = 6.2. CKD3: Baseline Cr ?  1.0-176 and stable (Since 2017). Stable and asymptomatic. Continue to monitor. Counseled to avoid NSAIDs. Depression: Taking current regimen (Prozac 20) without problems and with good control of symptoms. Well controlled. Continue current regimen. HCM:  Td: 10/16/12.  11/21/22 advised to do at his pharmacy. Flu: 12/9/21. Covid: Recommend staying UTD on Covid vaccinations/boosters. F/u: 3 Months. No planned labs at that visit    Birtha Barnacle was seen today for hypertension. Diagnoses and all orders for this visit:    Medicare annual wellness visit, subsequent    Forgetfulness  -     Basic Metabolic Panel; Future  -     CBC with Auto Differential; Future  -     Hepatic Function Panel; Future  -     Vitamin B12; Future    Coronary artery disease involving coronary bypass graft of native heart without angina pectoris  -     Basic Metabolic Panel;  Future  -     CBC with Auto Differential; Future  -     Hepatic Function Panel; Future  -     Vitamin B12; Future    Essential hypertension  -     Basic Metabolic Panel; Future  -     CBC with Auto Differential; Future  -     Hepatic Function Panel; Future  -     Vitamin B12; Future    Mixed hyperlipidemia  -     Basic Metabolic Panel; Future  -     CBC with Auto Differential; Future  -     Hepatic Function Panel; Future  -     Vitamin B12; Future    Prediabetes  -     Basic Metabolic Panel; Future  -     Vitamin B12; Future    Stage 3a chronic kidney disease ([facility] [address]  -     Basic Metabolic Panel; Future  -     CBC with Auto Differential; Future  -     Hepatic Function Panel; Future  -     Vitamin B12; Future    Major depressive disorder with single episode, in full remission ([facility] [address]    Encounter for immunization  -     Influenza, FLUAD, (age 72 y+), IM, Preservative Free, 0.5 mL    No follow-ups on file. Medicare Annual Wellness Visit    Harman Notice is here for Hypertension, Medicare AWV, and Memory Loss    Assessment & Plan   Medicare annual wellness visit, subsequent  Forgetfulness  -     Basic Metabolic Panel; Future  -     CBC with Auto Differential; Future  -     Hepatic Function Panel; Future  -     Vitamin B12; Future  Coronary artery disease involving coronary bypass graft of native heart without angina pectoris  -     Basic Metabolic Panel; Future  -     CBC with Auto Differential; Future  -     Hepatic Function Panel; Future  -     Vitamin B12; Future  Essential hypertension  -     Basic Metabolic Panel; Future  -     CBC with Auto Differential; Future  -     Hepatic Function Panel; Future  -     Vitamin B12; Future  Mixed hyperlipidemia  -     Basic Metabolic Panel; Future  -     CBC with Auto Differential; Future  -     Hepatic Function Panel; Future  -     Vitamin B12; Future  Prediabetes  -     Basic Metabolic Panel; Future  -     Vitamin B12; Future  Stage 3a chronic kidney disease ([facility] [address]  -     Basic Metabolic Panel;  Future  -     CBC with Auto Differential; Future  - Hepatic Function Panel; Future  -     Vitamin B12; Future  Major depressive disorder with single episode, in full remission ([facility] [address]  Encounter for immunization  -     Influenza, FLUAD, (age 72 y+), IM, Preservative Free, 0.5 mL    Recommendations for Preventive Services Due: see orders and patient instructions/AVS.  Recommended screening schedule for the next 5-10 years is provided to the patient in written form: see Patient Instructions/AVS.     Return in 3 months (on 2/21/2023). Subjective   The following acute and/or chronic problems were also addressed today:  Forgetfulness. Patient's complete Health Risk Assessment and screening values have been reviewed and are found in Flowsheets. The following problems were reviewed today and where indicated follow up appointments were made and/or referrals ordered. Positive Risk Factor Screenings with Interventions:             General Health and ACP:       Advance Directives       Power of  Living Will ACP-Advance Directive ACP-Power of     Not on File Not on File Not on File Not on File          General Health Risk Interventions:  Negative to all of the above. Objective   Vitals:    11/21/22 0949   BP: 119/67   Pulse: 68   Temp: 98.1 °F (36.7 °C)   TempSrc: Temporal   SpO2: 97%   Weight: 170 lb (77.1 kg)   Height: 5' 10\" (1.778 m)      Body mass index is 24.39 kg/m². No Known Allergies  Prior to Visit Medications    Medication Sig Taking?  Authorizing Provider   apixaban (ELIQUIS) 2.5 MG TABS tablet Take 1 tablet by mouth 2 times daily FREE 30 DAY TRIAL OFFER  RxBin [id number]  RxPCN 1016  [facility] [id number]   [phone]  Patient taking differently: Take 5 mg by mouth 2 times daily FREE 30 DAY TRIAL OFFER  RxBin [id number]  RxPCN 1016  [facility] [id number]   [phone] Yes Ramy Rehman MD   losartan (COZAAR) 100 MG tablet Take 1 tablet by mouth daily Yes Ramy Rehman MD   simvastatin (ZOCOR) 80 MG tablet Take 1 tablet by mouth nightly TAKE 1 TABLET AT BEDTIME Yes Sarika Juárez MD   FLUoxetine (PROZAC) 20 MG capsule Take 1 capsule by mouth daily Yes Hector Seals MD   aspirin 81 MG EC tablet Take 81 mg by mouth daily Yes Ar Automatic Reconciliation       CareTeam (Including outside providers/suppliers regularly involved in providing care):   Patient Care Team:  Hector Seals MD as PCP - Amando Quiñonez MD as PCP - [facility] Empaneled Provider     Reviewed and updated this visit:  Tobacco  Allergies  Meds  Problems  Med Hx  Surg Hx  Soc Hx  Fam Hx

## 2023-01-09 ENCOUNTER — INITIAL CONSULT (OUTPATIENT)
Dept: CARDIOLOGY CLINIC | Age: 83
End: 2023-01-09
Payer: MEDICARE

## 2023-01-09 VITALS
HEART RATE: 80 BPM | SYSTOLIC BLOOD PRESSURE: 136 MMHG | BODY MASS INDEX: 24.48 KG/M2 | HEIGHT: 70 IN | WEIGHT: 171 LBS | DIASTOLIC BLOOD PRESSURE: 80 MMHG

## 2023-01-09 DIAGNOSIS — I10 ESSENTIAL (PRIMARY) HYPERTENSION: Primary | ICD-10-CM

## 2023-01-09 DIAGNOSIS — R73.03 PREDIABETES: ICD-10-CM

## 2023-01-09 DIAGNOSIS — Z95.1 HISTORY OF CORONARY ARTERY BYPASS GRAFT: ICD-10-CM

## 2023-01-09 DIAGNOSIS — I25.810 CORONARY ARTERY DISEASE INVOLVING CORONARY BYPASS GRAFT OF NATIVE HEART WITHOUT ANGINA PECTORIS: ICD-10-CM

## 2023-01-09 DIAGNOSIS — N18.31 STAGE 3A CHRONIC KIDNEY DISEASE (HCC): ICD-10-CM

## 2023-01-09 DIAGNOSIS — I48.3 TYPICAL ATRIAL FLUTTER (HCC): ICD-10-CM

## 2023-01-09 DIAGNOSIS — E78.2 MIXED HYPERLIPIDEMIA: ICD-10-CM

## 2023-01-09 PROCEDURE — G8427 DOCREV CUR MEDS BY ELIG CLIN: HCPCS | Performed by: INTERNAL MEDICINE

## 2023-01-09 PROCEDURE — 3079F DIAST BP 80-89 MM HG: CPT | Performed by: INTERNAL MEDICINE

## 2023-01-09 PROCEDURE — G8484 FLU IMMUNIZE NO ADMIN: HCPCS | Performed by: INTERNAL MEDICINE

## 2023-01-09 PROCEDURE — 1123F ACP DISCUSS/DSCN MKR DOCD: CPT | Performed by: INTERNAL MEDICINE

## 2023-01-09 PROCEDURE — G8420 CALC BMI NORM PARAMETERS: HCPCS | Performed by: INTERNAL MEDICINE

## 2023-01-09 PROCEDURE — 3075F SYST BP GE 130 - 139MM HG: CPT | Performed by: INTERNAL MEDICINE

## 2023-01-09 PROCEDURE — 1036F TOBACCO NON-USER: CPT | Performed by: INTERNAL MEDICINE

## 2023-01-09 PROCEDURE — 99204 OFFICE O/P NEW MOD 45 MIN: CPT | Performed by: INTERNAL MEDICINE

## 2023-01-09 PROCEDURE — 93000 ELECTROCARDIOGRAM COMPLETE: CPT | Performed by: INTERNAL MEDICINE

## 2023-01-09 ASSESSMENT — ENCOUNTER SYMPTOMS
EYES NEGATIVE: 1
GASTROINTESTINAL NEGATIVE: 1
ALLERGIC/IMMUNOLOGIC NEGATIVE: 1
RESPIRATORY NEGATIVE: 1

## 2023-01-09 NOTE — PROGRESS NOTES
[facility] CARDIOLOGY  [address] [facility], [address]  PHONE: [phone]        23      NAME:  Lizzie Nguyễn  : 1940  MRN: [id number]     Referring Cardiologist: Peterson Gomez. Marci Conley MD    Reason for Consultation: Atrial flutter     ASSESSMENT and PLAN:  Rosanne Henderson was seen today for consultation and irregular heart beat. Diagnoses and all orders for this visit:    Essential (primary) hypertension    Typical atrial flutter (HCC)    Stage 3a chronic kidney disease ([facility] [address]    Coronary artery disease involving coronary bypass graft of native heart without angina pectoris    History of coronary artery bypass graft    Prediabetes    Mixed hyperlipidemia    [de-identified]year old male with a history of typical appearing atrial flutter here for evaluation. He is in NSR today and prefers to continue with current mgmt. We reviewed ablation. We also reviewed [address] and urged him to start Eliquis. If he decides to undergo ablation in the future, favor ILR implant as well. -AFL - start Eliquis. Considering ablation. Monitor for now. -HTN - continue current medicines. -CAD s/p CABG - OMT including ASA. -Routine cardiac care per Dr. Abdirahman Reyes.  -EP follow up in 6 months or PRN. Patient has been instructed and agrees to call our office with any issues or other concerns related to their cardiac condition(s) and/or complaint(s). No follow-up provider specified. Thank you for allowing me to participate in the electrophysiologic care of Mr. Lizzie Nguyễn. Please contact me if any questions or concerns were to arise. Pat Holloway.  Alyssa GOODWIN, MS  Clinical Cardiac Electrophysiology  [facility]  23  11:08 AM    ===================================================================  Chief Complant:    Chief Complaint   Patient presents with    Consultation    Irregular Heart Beat     [id number]        Consultation is requested by Shayan Toney MD for evaluation of Consultation and Irregular Heart Beat ([id number])    History:  Henny Arzola is a most pleasant [de-identified] y.o. male with a past medical and cardiac history significant for typical appearing atrial flutter. He is referred by Dr. Tru Adorno for an EP evaluation. He was seen in October and was in typical appearing atrial flutter at that time. He is in NSR today. He comes in today to discuss his arrhythmia. He was planned to start [facility] but still has not started it. He otherwise is doing reasonably well. He is accompanied by his caregiver. The patient otherwise denies chest pain, dyspnea, presyncope, syncope or lateralizing symptoms. 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 He is a , lost wife in 1. He has a caregiver. Cardiac PMH: (Old records have been reviewed and summarized below)    Coronary artery bypass grafting 2004 LIMA to LAD, SVG to ramus, SVG to D3, SVG to D1. Cardiac catheterization 2011, left main free of significant disease, circumflex relatively small vessel, marginal branch. No significant stenosis. Native coronary LAD vessels occluded with patent bypass grafts, status post bypass graft anatomy described  above. Nonobstructive disease noted in right coronary artery with severe distal vessel disease, medical management. 09/08/2020 ECG - sinus rhythm, occasional PACs, nonspecific ST depression, rate 91 beats per minute. 3/2021 echocardiogram mild mitral regurgitation no aortic regurgitation or aortic stenosis  10/10/2022 EKG atrial flutter    EKG:  (EKG has been independently visualized by me with interpretation below): NSR, bifid p wave, normal axis, no ischemia.      ECG: 10/10/2022, AFL       ECHO: 3/2021, mildly dilated LA    Previous Heart Catheterization: n/a     Stress Test: n/a     DEVICE INTERROGATION: n/a     Past Medical History, Past Surgical History, Family history, Social History, and Medications were all reviewed with the patient today and updated as necessary. Current Outpatient Medications   Medication Sig Dispense Refill    apixaban (ELIQUIS) 2.5 MG TABS tablet Take 1 tablet by mouth 2 times daily 60 tablet 11    FLUoxetine (PROZAC) 20 MG capsule Take 1 capsule by mouth daily 90 capsule 1    losartan (COZAAR) 100 MG tablet Take 1 tablet by mouth daily 90 tablet 3    simvastatin (ZOCOR) 80 MG tablet Take 1 tablet by mouth nightly TAKE 1 TABLET AT BEDTIME 90 tablet 3    aspirin 81 MG EC tablet Take 81 mg by mouth daily      apixaban (ELIQUIS) 2.5 MG TABS tablet Take 1 tablet by mouth 2 times daily FREE 30 DAY TRIAL OFFER  RxBin [id number]  RxPCN 1016  [facility] [id number]   [phone] (Patient not taking: Reported on 1/9/2023) 60 tablet 0     No current facility-administered medications for this visit.      No Known Allergies    Past Medical History:   Diagnosis Date    Acute encephalopathy 6/18/2021    Arthritis     OA- bilat knees    Bladder absent     stoma present    Bladder cancer (HCC)     CAD (coronary artery disease)     CABG x 4 in 2004 - no damage- emergenc CABG- Cath 1/2011- total occlusion ramus, total mid occlusion left anterior descending, 50-70% stenosis of mid right coronary artery- \"Normal LEF\"    Cancer ([facility] [address] 1/1/2008    bladder- chemo in bladder then 7/11 bladder removed    CRI (chronic renal insufficiency), stage 3 (moderate) (HCC)     Depression     Family history of diabetes mellitus     Heart murmur     mild mitral regurgitation    Hypercholesteremia     Hypertension     Infection     to s/p hernia wound 7/3927    Other complication of colostomy or enterostomy     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    Followed by surgeon Dr Lis Nance     Unspecified intestinal obstruction     Urinary tract infection 6/18/2021    Ventral hernia, unspecified, without mention of obstruction or gangrene      Past Surgical History:   Procedure Laterality Date    CARDIAC CATHETERIZATION  2011    COLONOSCOPY  1/07    repeat 10 years    HERNIA REPAIR  9/2011 9 days later emergency s/p hernia repair surg    HERNIA REPAIR  8/26/11    hernia repair    IR CHANGE URET TUBE VIA ILEAL CONDUIT  3/3/2022    IR NEPHROSTOGRAM EXISTING ACCESS  3/8/2022    IR NEPHROSTOMY CONVERT CATH TO NEPHROURETERAL CATH  8/3/2021    IR NEPHROSTOMY CONVERT CATH TO NEPHROURETERAL CATH  8/3/2021    IR NEPHROSTOMY CONVERT CATH TO NEPHROURETERAL CATH 8/3/2021 [facility] SPECI[facility]    IR NEPHROSTOMY EXCHANGE CATHETER  8/6/2021    IR NEPHROSTOMY EXCHANGE CATHETER  10/12/2021    IR NEPHROSTOMY EXCHANGE CATHETER  11/22/2021    IR NEPHROSTOMY PERCUTANEOUS LEFT  6/21/2021    IR NEPHROSTOMY PERCUTANEOUS LEFT  6/21/2021    IR NEPHROSTOMY PERCUTANEOUS LEFT 6/21/2021 [facility] SPECIALS    HI UNLISTED PROCEDURE CARDIAC SURGERY  2004    CABG x 4    REMV BLADDER,TOTAL  7/2008    ostomy    VASECTOMY  1974     Family History   Problem Relation Age of Onset    Heart Disease Father 37        MI    Hypertension Father     Heart Disease Sister     Osteoarthritis Sister     Cancer Mother         OVARY    Diabetes Sister     Hypertension Sister      Social History     Tobacco Use    Smoking status: Never    Smokeless tobacco: Never   Substance Use Topics    Alcohol use: No       ROS:  A comprehensive review of systems was performed with the pertinent positives and negatives as noted in the HPI in addition to:  Review of Systems   Constitutional: Negative. HENT: Negative. Eyes: Negative. Respiratory: Negative. Cardiovascular: Negative. Gastrointestinal: Negative. Endocrine: Negative. Genitourinary: Negative. Musculoskeletal: Negative. Skin: Negative. Allergic/Immunologic: Negative. Neurological: Negative. Hematological: Negative. Psychiatric/Behavioral: Negative. All other systems reviewed and are negative.       PHYSICAL EXAM:   /80   Pulse 80   Ht 5' 10\" (1.778 m)   Wt 171 lb (77.6 kg)   BMI 24.54 kg/m²      Wt Readings from Last 3 Encounters:   01/09/23 171 lb (77.6 kg)   11/21/22 170 lb (77.1 kg)   10/10/22 170 lb (77.1 kg)     BP Readings from Last 3 Encounters:   01/09/23 136/80   11/21/22 119/67   10/27/22 109/61       Gen: Well appearing, well developed, no acute distress  Eyes: Pupils equal, round. Extraocular movements are intact  ENT: Oropharynx clear, no oral lesions, normal dentition  CV: S1S2, regular rate and rhythm, no murmurs, rubs or gallops, normal JVD, no carotid bruits, normal distal pulses, no ION  Pulm: Clear to auscultation bilaterally, no accessory muscle uses, no wheezes or rales  GI: Soft, NT, ND, +BS  Neuro: Alert and oriented, nonfocal  Psych: Appropriate affect  Skin: Normal color and skin turgor  MSK: Normal muscle bulk and tone    Medical problems and test results were reviewed with the patient today. No results found for any visits on 01/09/23.

## 2023-01-30 ENCOUNTER — HOSPITAL ENCOUNTER (OUTPATIENT)
Dept: INTERVENTIONAL RADIOLOGY/VASCULAR | Age: 83
Discharge: HOME | End: 2023-02-02
Payer: MEDICARE

## 2023-01-30 VITALS
TEMPERATURE: 97.4 F | OXYGEN SATURATION: 99 % | HEART RATE: 65 BPM | HEIGHT: 70 IN | SYSTOLIC BLOOD PRESSURE: 173 MMHG | WEIGHT: 171 LBS | RESPIRATION RATE: 18 BRPM | BODY MASS INDEX: 24.48 KG/M2 | DIASTOLIC BLOOD PRESSURE: 77 MMHG

## 2023-01-30 DIAGNOSIS — N18.9 CHRONIC KIDNEY DISEASE, UNSPECIFIED CKD STAGE: ICD-10-CM

## 2023-01-30 PROCEDURE — 6360000004 HC RX CONTRAST MEDICATION: Performed by: RADIOLOGY

## 2023-01-30 PROCEDURE — 6370000000 HC RX 637 (ALT 250 FOR IP): Performed by: RADIOLOGY

## 2023-01-30 PROCEDURE — 50688 CHANGE OF URETER TUBE/STENT: CPT

## 2023-01-30 RX ORDER — DIAZEPAM 5 MG/1
10 TABLET ORAL ONCE
Status: COMPLETED | OUTPATIENT
Start: 2023-01-30 | End: 2023-01-30

## 2023-01-30 RX ORDER — LIDOCAINE HYDROCHLORIDE 20 MG/ML
SOLUTION OROPHARYNGEAL
Status: COMPLETED | OUTPATIENT
Start: 2023-01-30 | End: 2023-01-30

## 2023-01-30 RX ORDER — OXYCODONE HYDROCHLORIDE 5 MG/1
10 TABLET ORAL
Status: COMPLETED | OUTPATIENT
Start: 2023-01-30 | End: 2023-01-30

## 2023-01-30 RX ADMIN — IOHEXOL 5 ML: 300 INJECTION, SOLUTION INTRAVENOUS at 11:37

## 2023-01-30 RX ADMIN — OXYCODONE HYDROCHLORIDE 10 MG: 5 TABLET ORAL at 11:04

## 2023-01-30 RX ADMIN — DIAZEPAM 10 MG: 5 TABLET ORAL at 11:04

## 2023-01-30 RX ADMIN — LIDOCAINE HYDROCHLORIDE 5 ML: 20 SOLUTION ORAL at 11:33

## 2023-01-30 ASSESSMENT — PAIN - FUNCTIONAL ASSESSMENT: PAIN_FUNCTIONAL_ASSESSMENT: NONE - DENIES PAIN

## 2023-01-30 NOTE — DISCHARGE INSTRUCTIONS
If you have any questions about your procedure, please call the Interventional Radiology department at [phone]. After business hours (5pm) and weekends, call the answering service at [phone] and ask for the Radiologist on call to be paged. Si tiene Preguntas acerca del procedimiento, por favor llame al departamento de Radiología Intervencional al [phone]. Después de horas de oficina (5 pm) y los fines de [place], llamar al Clydealeena Manley Candace al [phone] y pregunte por el Radiologo de [facility]. Interventional Radiology General Nurse Discharge    After general anesthesia or intravenous sedation, for 24 hours or while taking prescription Narcotics:  Limit your activities  Do not drive and operate hazardous machinery  Do not make important personal or business decisions  Do  not drink alcoholic beverages  If you have not urinated within 8 hours after discharge, please contact your surgeon on call. * Please give a list of your current medications to your Primary Care Provider. * Please update this list whenever your medications are discontinued, doses are     changed, or new medications (including over-the-counter products) are added. * Please carry medication information at all times in case of emergency situations. These are general instructions for a healthy lifestyle:    No smoking/ No tobacco products/ Avoid exposure to second hand smoke  Surgeon General's Warning:  Quitting smoking now greatly reduces serious risk to your health.     Obesity, smoking, and sedentary lifestyle greatly increases your risk for illness  A healthy diet, regular physical exercise & weight monitoring are important for maintaining a healthy lifestyle    You may be retaining fluid if you have a history of heart failure or if you experience any of the following symptoms:  Weight gain of 3 pounds or more overnight or 5 pounds in a week, increased swelling in our hands or feet or shortness of breath while lying flat in bed. Please call your doctor as soon as you notice any of these symptoms; do not wait until your next office visit. Recognize signs and symptoms of STROKE:  F-face looks uneven    A-arms unable to move or move unevenly    S-speech slurred or non-existent    T-time-call 911 as soon as signs and symptoms begin-DO NOT go       Back to bed or wait to see if you get better-TIME IS BRAIN.

## 2023-01-30 NOTE — OR NURSING
Recovery period without difficulty. Pt alert and oriented and denies pain. Dressing is clean, dry, and intact. Reviewed discharge instructions with patient and David Notch, both verbalized understanding.

## 2023-02-09 ENCOUNTER — TELEPHONE (OUTPATIENT)
Dept: CARDIOLOGY CLINIC | Age: 83
End: 2023-02-09

## 2023-02-09 NOTE — TELEPHONE ENCOUNTER
----- Message from Anita Martinez MD sent at 2/6/2023  1:05 PM EST -----  Brief arrhythmia.  Stable.     ----- Message -----  From: Anita Martinez MD  Sent: 2/6/2023   1:00 PM EST  To: Anita Martinez MD

## 2023-02-14 ENCOUNTER — TELEPHONE (OUTPATIENT)
Dept: CARDIOLOGY CLINIC | Age: 83
End: 2023-02-14

## 2023-02-14 NOTE — TELEPHONE ENCOUNTER
Left message requesting a call back to inform patient that Ayaan Zayas needs proof of income to process patient  assistance application.

## 2023-02-20 NOTE — TELEPHONE ENCOUNTER
Vee informed that we need proof of income to complete patient assistance application. David Arreguin stated she will drop paperwork at our office.

## 2023-02-23 ENCOUNTER — OFFICE VISIT (OUTPATIENT)
Dept: INTERNAL MEDICINE CLINIC | Facility: CLINIC | Age: 83
End: 2023-02-23
Payer: MEDICARE

## 2023-02-23 VITALS
TEMPERATURE: 97.5 F | BODY MASS INDEX: 23.77 KG/M2 | WEIGHT: 166 LBS | DIASTOLIC BLOOD PRESSURE: 88 MMHG | HEIGHT: 70 IN | SYSTOLIC BLOOD PRESSURE: 137 MMHG | HEART RATE: 60 BPM | OXYGEN SATURATION: 98 %

## 2023-02-23 DIAGNOSIS — C67.9 MALIGNANT NEOPLASM OF URINARY BLADDER, UNSPECIFIED SITE (HCC): ICD-10-CM

## 2023-02-23 DIAGNOSIS — F32.5 MAJOR DEPRESSIVE DISORDER WITH SINGLE EPISODE, IN FULL REMISSION (HCC): ICD-10-CM

## 2023-02-23 DIAGNOSIS — I48.3 TYPICAL ATRIAL FLUTTER (HCC): ICD-10-CM

## 2023-02-23 DIAGNOSIS — N18.31 STAGE 3A CHRONIC KIDNEY DISEASE (HCC): ICD-10-CM

## 2023-02-23 DIAGNOSIS — I10 ESSENTIAL (PRIMARY) HYPERTENSION: ICD-10-CM

## 2023-02-23 DIAGNOSIS — E78.2 MIXED HYPERLIPIDEMIA: ICD-10-CM

## 2023-02-23 DIAGNOSIS — R73.03 PREDIABETES: ICD-10-CM

## 2023-02-23 DIAGNOSIS — I25.810 CORONARY ARTERY DISEASE INVOLVING CORONARY BYPASS GRAFT OF NATIVE HEART WITHOUT ANGINA PECTORIS: Primary | ICD-10-CM

## 2023-02-23 DIAGNOSIS — I25.810 CORONARY ARTERY DISEASE INVOLVING CORONARY BYPASS GRAFT OF NATIVE HEART WITHOUT ANGINA PECTORIS: ICD-10-CM

## 2023-02-23 LAB
ALBUMIN SERPL-MCNC: 3.8 G/DL (ref 3.2–4.6)
ALBUMIN/GLOB SERPL: 1.2 (ref 0.4–1.6)
ALP SERPL-CCNC: 50 U/L (ref 50–136)
ALT SERPL-CCNC: 19 U/L (ref 12–65)
ANION GAP SERPL CALC-SCNC: 4 MMOL/L (ref 2–11)
AST SERPL-CCNC: 16 U/L (ref 15–37)
BASOPHILS # BLD: 0.1 K/UL (ref 0–0.2)
BASOPHILS NFR BLD: 1 % (ref 0–2)
BILIRUB DIRECT SERPL-MCNC: 0.1 MG/DL
BILIRUB SERPL-MCNC: 0.8 MG/DL (ref 0.2–1.1)
BUN SERPL-MCNC: 22 MG/DL (ref 8–23)
CALCIUM SERPL-MCNC: 9.4 MG/DL (ref 8.3–10.4)
CHLORIDE SERPL-SCNC: 111 MMOL/L (ref 101–110)
CO2 SERPL-SCNC: 25 MMOL/L (ref 21–32)
CREAT SERPL-MCNC: 1.5 MG/DL (ref 0.8–1.5)
DIFFERENTIAL METHOD BLD: ABNORMAL
EOSINOPHIL # BLD: 0.2 K/UL (ref 0–0.8)
EOSINOPHIL NFR BLD: 3 % (ref 0.5–7.8)
ERYTHROCYTE [DISTWIDTH] IN BLOOD BY AUTOMATED COUNT: 13.1 % (ref 11.9–14.6)
EST. AVERAGE GLUCOSE BLD GHB EST-MCNC: 123 MG/DL
GLOBULIN SER CALC-MCNC: 3.2 G/DL (ref 2.8–4.5)
GLUCOSE SERPL-MCNC: 106 MG/DL (ref 65–100)
HBA1C MFR BLD: 5.9 % (ref 4.8–5.6)
HCT VFR BLD AUTO: 40 % (ref 41.1–50.3)
HGB BLD-MCNC: 13.2 G/DL (ref 13.6–17.2)
IMM GRANULOCYTES # BLD AUTO: 0 K/UL (ref 0–0.5)
IMM GRANULOCYTES NFR BLD AUTO: 0 % (ref 0–5)
LYMPHOCYTES # BLD: 2.8 K/UL (ref 0.5–4.6)
LYMPHOCYTES NFR BLD: 38 % (ref 13–44)
MCH RBC QN AUTO: 31.1 PG (ref 26.1–32.9)
MCHC RBC AUTO-ENTMCNC: 33 G/DL (ref 31.4–35)
MCV RBC AUTO: 94.1 FL (ref 82–102)
MONOCYTES # BLD: 0.6 K/UL (ref 0.1–1.3)
MONOCYTES NFR BLD: 9 % (ref 4–12)
NEUTS SEG # BLD: 3.6 K/UL (ref 1.7–8.2)
NEUTS SEG NFR BLD: 49 % (ref 43–78)
NRBC # BLD: 0 K/UL (ref 0–0.2)
PLATELET # BLD AUTO: 167 K/UL (ref 150–450)
PMV BLD AUTO: 9.9 FL (ref 9.4–12.3)
POTASSIUM SERPL-SCNC: 4.3 MMOL/L (ref 3.5–5.1)
PROT SERPL-MCNC: 7 G/DL (ref 6.3–8.2)
RBC # BLD AUTO: 4.25 M/UL (ref 4.23–5.6)
SODIUM SERPL-SCNC: 140 MMOL/L (ref 133–143)
WBC # BLD AUTO: 7.3 K/UL (ref 4.3–11.1)

## 2023-02-23 PROCEDURE — 99214 OFFICE O/P EST MOD 30 MIN: CPT | Performed by: INTERNAL MEDICINE

## 2023-02-23 PROCEDURE — G8484 FLU IMMUNIZE NO ADMIN: HCPCS | Performed by: INTERNAL MEDICINE

## 2023-02-23 PROCEDURE — 1123F ACP DISCUSS/DSCN MKR DOCD: CPT | Performed by: INTERNAL MEDICINE

## 2023-02-23 PROCEDURE — 1036F TOBACCO NON-USER: CPT | Performed by: INTERNAL MEDICINE

## 2023-02-23 PROCEDURE — G8420 CALC BMI NORM PARAMETERS: HCPCS | Performed by: INTERNAL MEDICINE

## 2023-02-23 PROCEDURE — G8427 DOCREV CUR MEDS BY ELIG CLIN: HCPCS | Performed by: INTERNAL MEDICINE

## 2023-02-23 PROCEDURE — 3079F DIAST BP 80-89 MM HG: CPT | Performed by: INTERNAL MEDICINE

## 2023-02-23 PROCEDURE — 3075F SYST BP GE 130 - 139MM HG: CPT | Performed by: INTERNAL MEDICINE

## 2023-02-23 RX ORDER — LOSARTAN POTASSIUM 100 MG/1
100 TABLET ORAL DAILY
Qty: 90 TABLET | Refills: 1 | Status: SHIPPED | OUTPATIENT
Start: 2023-02-23

## 2023-02-23 RX ORDER — SIMVASTATIN 80 MG
80 TABLET ORAL NIGHTLY
Qty: 90 TABLET | Refills: 1 | Status: SHIPPED | OUTPATIENT
Start: 2023-02-23

## 2023-02-23 SDOH — ECONOMIC STABILITY: INCOME INSECURITY: HOW HARD IS IT FOR YOU TO PAY FOR THE VERY BASICS LIKE FOOD, HOUSING, MEDICAL CARE, AND HEATING?: NOT HARD AT ALL

## 2023-02-23 SDOH — ECONOMIC STABILITY: FOOD INSECURITY: WITHIN THE PAST 12 MONTHS, YOU WORRIED THAT YOUR FOOD WOULD RUN OUT BEFORE YOU GOT MONEY TO BUY MORE.: NEVER TRUE

## 2023-02-23 SDOH — ECONOMIC STABILITY: FOOD INSECURITY: WITHIN THE PAST 12 MONTHS, THE FOOD YOU BOUGHT JUST DIDN'T LAST AND YOU DIDN'T HAVE MONEY TO GET MORE.: NEVER TRUE

## 2023-02-23 SDOH — ECONOMIC STABILITY: HOUSING INSECURITY
IN THE LAST 12 MONTHS, WAS THERE A TIME WHEN YOU DID NOT HAVE A STEADY PLACE TO SLEEP OR SLEPT IN A SHELTER (INCLUDING NOW)?: NO

## 2023-02-23 ASSESSMENT — ANXIETY QUESTIONNAIRES
IF YOU CHECKED OFF ANY PROBLEMS ON THIS QUESTIONNAIRE, HOW DIFFICULT HAVE THESE PROBLEMS MADE IT FOR YOU TO DO YOUR WORK, TAKE CARE OF THINGS AT HOME, OR GET ALONG WITH OTHER PEOPLE: NOT DIFFICULT AT ALL
2. NOT BEING ABLE TO STOP OR CONTROL WORRYING: 0
5. BEING SO RESTLESS THAT IT IS HARD TO SIT STILL: 0
7. FEELING AFRAID AS IF SOMETHING AWFUL MIGHT HAPPEN: 0
6. BECOMING EASILY ANNOYED OR IRRITABLE: 0
GAD7 TOTAL SCORE: 0
1. FEELING NERVOUS, ANXIOUS, OR ON EDGE: 0
3. WORRYING TOO MUCH ABOUT DIFFERENT THINGS: 0
4. TROUBLE RELAXING: 0

## 2023-02-23 ASSESSMENT — PATIENT HEALTH QUESTIONNAIRE - PHQ9
7. TROUBLE CONCENTRATING ON THINGS, SUCH AS READING THE NEWSPAPER OR WATCHING TELEVISION: 0
8. MOVING OR SPEAKING SO SLOWLY THAT OTHER PEOPLE COULD HAVE NOTICED. OR THE OPPOSITE, BEING SO FIGETY OR RESTLESS THAT YOU HAVE BEEN MOVING AROUND A LOT MORE THAN USUAL: 0
SUM OF ALL RESPONSES TO PHQ QUESTIONS 1-9: 0
5. POOR APPETITE OR OVEREATING: 0
SUM OF ALL RESPONSES TO PHQ9 QUESTIONS 1 & 2: 0
4. FEELING TIRED OR HAVING LITTLE ENERGY: 0
SUM OF ALL RESPONSES TO PHQ QUESTIONS 1-9: 0
1. LITTLE INTEREST OR PLEASURE IN DOING THINGS: 0
3. TROUBLE FALLING OR STAYING ASLEEP: 0
SUM OF ALL RESPONSES TO PHQ QUESTIONS 1-9: 0
6. FEELING BAD ABOUT YOURSELF - OR THAT YOU ARE A FAILURE OR HAVE LET YOURSELF OR YOUR FAMILY DOWN: 0
SUM OF ALL RESPONSES TO PHQ QUESTIONS 1-9: 0
2. FEELING DOWN, DEPRESSED OR HOPELESS: 0
9. THOUGHTS THAT YOU WOULD BE BETTER OFF DEAD, OR OF HURTING YOURSELF: 0

## 2023-02-23 ASSESSMENT — ENCOUNTER SYMPTOMS
BLOOD IN STOOL: 0
SHORTNESS OF BREATH: 0

## 2023-02-23 NOTE — PROGRESS NOTES
Cb Alston M.D. Internal Medicine  [address], [address]   Phone: [phone]  Fax: [phone]    Hypertension  This is a chronic problem. The current episode started more than 1 year ago. The problem has been waxing and waning since onset. The problem is controlled. Pertinent negatives include no chest pain or shortness of breath. There are no associated agents to hypertension. Risk factors for coronary artery disease include male gender and dyslipidemia. Past treatments include angiotensin blockers. The current treatment provides moderate improvement. There are no compliance problems. Wilman Espitia is a [de-identified] y.o. White (non-) male. Current Outpatient Medications   Medication Sig Dispense Refill    apixaban (ELIQUIS) 2.5 MG TABS tablet Take 1 tablet by mouth 2 times daily 60 tablet 11    FLUoxetine (PROZAC) 20 MG capsule Take 1 capsule by mouth daily 90 capsule 1    losartan (COZAAR) 100 MG tablet Take 1 tablet by mouth daily 90 tablet 3    simvastatin (ZOCOR) 80 MG tablet Take 1 tablet by mouth nightly TAKE 1 TABLET AT BEDTIME 90 tablet 3    aspirin 81 MG EC tablet Take 81 mg by mouth daily       No current facility-administered medications for this visit.      No Known Allergies    Past Medical History:   Diagnosis Date    Acute encephalopathy 6/18/2021    Arthritis     OA- bilat knees    Bladder absent     stoma present    Bladder cancer (HCC)     CAD (coronary artery disease)     CABG x 4 in 2004 - no damage- emergenc CABG- Cath 1/2011- total occlusion ramus, total mid occlusion left anterior descending, 50-70% stenosis of mid right coronary artery- \"Normal LEF\"    Cancer ([facility] [address] 1/1/2008    bladder- chemo in bladder then 7/11 bladder removed    CRI (chronic renal insufficiency), stage 3 (moderate) (HCC)     Depression     Family history of diabetes mellitus     Heart murmur     mild mitral regurgitation    Hypercholesteremia     Hypertension Infection     to s/p hernia wound 0/4282    Other complication of colostomy or enterostomy     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    Followed by surgeon Dr Rene Gamez     Unspecified intestinal obstruction     Urinary tract infection 6/18/2021    Ventral hernia, unspecified, without mention of obstruction or gangrene      Past Surgical History:   Procedure Laterality Date    CARDIAC CATHETERIZATION  2011    COLONOSCOPY  1/07    repeat 10 years    HERNIA REPAIR  9/2011    9 days later emergency s/p hernia repair surg    HERNIA REPAIR  8/26/11    hernia repair    IR CHANGE URET TUBE VIA ILEAL CONDUIT  3/3/2022    IR NEPHROSTOGRAM EXISTING ACCESS  3/8/2022    IR NEPHROSTOMY CONVERT CATH TO NEPHROURETERAL CATH  8/3/2021    IR NEPHROSTOMY CONVERT CATH TO NEPHROURETERAL CATH  8/3/2021    IR NEPHROSTOMY CONVERT CATH TO NEPHROURETERAL CATH 8/3/2021 [facility]    IR NEPHROSTOMY EXCHANGE CATHETER  8/6/2021    IR NEPHROSTOMY EXCHANGE CATHETER  10/12/2021    IR NEPHROSTOMY EXCHANGE CATHETER  11/22/2021    IR NEPHROSTOMY PERCUTANEOUS LEFT  6/21/2021    IR NEPHROSTOMY PERCUTANEOUS LEFT  6/21/2021    IR NEPHROSTOMY PERCUTANEOUS LEFT 6/21/2021 [facility]    TX UNLISTED PROCEDURE CARDIAC SURGERY  2004    CABG x 4    REMV BLADDER,TOTAL  7/2008    ostomy    VASECTOMY  1974     Social History     Tobacco Use    Smoking status: Never    Smokeless tobacco: Never   Substance Use Topics    Alcohol use: No    Drug use: Never     Family History   Problem Relation Age of Onset    Heart Disease Father 37        MI    Hypertension Father     Heart Disease Sister     Osteoarthritis Sister     Cancer Mother         OVARY    Diabetes Sister     Hypertension Sister       Review of Systems   Respiratory:  Negative for shortness of breath. Cardiovascular:  Negative for chest pain. Gastrointestinal:  Negative for blood in stool. Psychiatric/Behavioral:  Negative for self-injury and suicidal ideas. Physical Exam  Vitals and nursing note reviewed. Constitutional:       General: He is not in acute distress. Appearance: Normal appearance. He is normal weight. He is not ill-appearing, toxic-appearing or diaphoretic. HENT:      Head: Normocephalic and atraumatic. Right Ear: External ear normal.      Left Ear: External ear normal.   Eyes:      General: No scleral icterus. Right eye: No discharge. Left eye: No discharge. Conjunctiva/sclera: Conjunctivae normal.   Cardiovascular:      Rate and Rhythm: Normal rate and regular rhythm. Heart sounds: Normal heart sounds. No murmur heard. No friction rub. No gallop. Pulmonary:      Effort: Pulmonary effort is normal. No respiratory distress. Breath sounds: Normal breath sounds. No stridor. No wheezing, rhonchi or rales. Abdominal:      General: Abdomen is flat. Bowel sounds are normal. There is no distension. Palpations: Abdomen is soft. There is no mass. Tenderness: There is no abdominal tenderness. There is no guarding or rebound. Musculoskeletal:      Right lower leg: No edema. Left lower leg: No edema. Skin:     General: Skin is warm and dry. Coloration: Skin is not jaundiced or pale. Findings: No erythema. Neurological:      General: No focal deficit present. Mental Status: He is alert and oriented to person, place, and time. Mental status is at baseline. Psychiatric:         Mood and Affect: Mood normal.         Behavior: Behavior normal.         Thought Content: Thought content normal.         Judgment: Judgment normal.      ASSESSMENT AND PLAN:    CAD: CABG x 4 in 2004. Follows with Cardiology (Dr. Jagdish Luna). Maintained on Asa. Risk factors medically modified per below. Taking medications w/o problems. Well controlled w/o angina or JORDAN. Continue Asa (No bleeding). Continue risk factor modification per below. Follow up with Dr. Jagdish Luna (Also with Nicole Sommers).   Patient reports non-adherence with Eliquis. Advised him to contact Cardiology and let them know. He is resistant to this. HTN: Taking current regimen (Losartan 100) w/o problems. Home BPs = Not being checked. Well controlled. Continue current regimen. Asked to monitor BP at home, call me if it runs above 140/80, and bring in a log next time. HLD: Taking current regimen (Zocor 80) w/o problems. Well controlled. Continue current regimen. FLPs:  9/2/20 on Zocor 80 = [132/71/48/64]. 8/17/22 on Zocor 80 = [147/77.4/49/103]. Prediabetes: Stable. Continue to monitor. A1C's:  12/2/20 = 6.4.  8/17/22 = 6.2.   2/23/23 =   CKD3: Baseline Cr ?  1.0-1.7 and stable (Since 2017). Stable and asymptomatic. Continue to monitor. Counseled to avoid NSAIDs. Bladder Cancer: Follows with Urology (Dr. Wendy Gomez). Stable. Follow up with Dr. Wendy Gomez. Depression: Taking current regimen (Prozac 20) without problems and with good control of symptoms. Well controlled. Continue current regimen. HCM:  Flu: 11/21/22. Covid: Recommend staying UTD on Covid vaccinations/boosters. F/u: 3 Months. No planned labs at that visit    Cindy Hernández was seen today for hypertension and cholesterol problem. Diagnoses and all orders for this visit:    Coronary artery disease involving coronary bypass graft of native heart without angina pectoris  -     Basic Metabolic Panel; Future  -     CBC with Auto Differential; Future  -     Hepatic Function Panel; Future  -     losartan (COZAAR) 100 MG tablet; Take 1 tablet by mouth daily  -     simvastatin (ZOCOR) 80 MG tablet; Take 1 tablet by mouth nightly    Essential (primary) hypertension  -     Basic Metabolic Panel; Future  -     CBC with Auto Differential; Future  -     Hepatic Function Panel; Future  -     losartan (COZAAR) 100 MG tablet; Take 1 tablet by mouth daily    Mixed hyperlipidemia  -     Basic Metabolic Panel;  Future  -     CBC with Auto Differential; Future  -     Hepatic Function Panel; Future  -     simvastatin (ZOCOR) 80 MG tablet; Take 1 tablet by mouth nightly    Prediabetes  -     Basic Metabolic Panel; Future  -     Hemoglobin A1C; Future    Stage 3a chronic kidney disease ([facility]  -     Basic Metabolic Panel; Future  -     CBC with Auto Differential; Future  -     Hepatic Function Panel; Future    Typical atrial flutter (HCC)  -     Basic Metabolic Panel; Future  -     CBC with Auto Differential; Future  -     Hepatic Function Panel; Future    Malignant neoplasm of urinary bladder, unspecified site [facility])  -     Basic Metabolic Panel; Future  -     CBC with Auto Differential; Future  -     Hepatic Function Panel; Future    Major depressive disorder with single episode, in full remission ([facility] [address]  -     Basic Metabolic Panel; Future  -     CBC with Auto Differential; Future  -     Hepatic Function Panel; Future    Return in about 3 months (around 5/23/2023).

## 2023-04-04 ENCOUNTER — TELEPHONE (OUTPATIENT)
Dept: UROLOGY | Age: 83
End: 2023-04-04

## 2023-04-27 ENCOUNTER — TELEPHONE (OUTPATIENT)
Dept: UROLOGY | Age: 83
End: 2023-04-27

## 2023-04-27 NOTE — TELEPHONE ENCOUNTER
Patients caregiver came by the office to drop off [facility] supplies list that he was in need of. I placed an order from [employer] supplies. The representative said the supplies will be there within 1-2 business days. Called caregiver back to inform her of when they should arrive, no answer. I left a detailed voicemail.

## 2023-05-02 NOTE — TELEPHONE ENCOUNTER
Spoke with patient's caregiver.  She states that she will call [employer] to check status of order made by Clara Cowart.

## 2023-06-05 DIAGNOSIS — F32.5 MAJOR DEPRESSIVE DISORDER WITH SINGLE EPISODE, IN FULL REMISSION (HCC): ICD-10-CM

## 2023-06-05 RX ORDER — FLUOXETINE HYDROCHLORIDE 20 MG/1
CAPSULE ORAL
Qty: 90 CAPSULE | Refills: 1 | OUTPATIENT
Start: 2023-06-05

## 2023-06-08 ENCOUNTER — HOSPITAL ENCOUNTER (OUTPATIENT)
Dept: INTERVENTIONAL RADIOLOGY/VASCULAR | Age: 83
End: 2023-06-08
Attending: RADIOLOGY
Payer: MEDICARE

## 2023-06-08 VITALS
BODY MASS INDEX: 23.62 KG/M2 | DIASTOLIC BLOOD PRESSURE: 87 MMHG | TEMPERATURE: 97.8 F | WEIGHT: 165 LBS | RESPIRATION RATE: 16 BRPM | SYSTOLIC BLOOD PRESSURE: 165 MMHG | HEIGHT: 70 IN | OXYGEN SATURATION: 98 % | HEART RATE: 88 BPM

## 2023-06-08 DIAGNOSIS — N18.9 CHRONIC KIDNEY DISEASE, UNSPECIFIED CKD STAGE: ICD-10-CM

## 2023-06-08 PROCEDURE — 6370000000 HC RX 637 (ALT 250 FOR IP): Performed by: RADIOLOGY

## 2023-06-08 PROCEDURE — 6360000004 HC RX CONTRAST MEDICATION: Performed by: RADIOLOGY

## 2023-06-08 PROCEDURE — C1769 GUIDE WIRE: HCPCS

## 2023-06-08 RX ORDER — DIAZEPAM 5 MG/1
10 TABLET ORAL ONCE
Status: COMPLETED | OUTPATIENT
Start: 2023-06-08 | End: 2023-06-08

## 2023-06-08 RX ORDER — OXYCODONE HYDROCHLORIDE 5 MG/1
10 TABLET ORAL
Status: COMPLETED | OUTPATIENT
Start: 2023-06-08 | End: 2023-06-08

## 2023-06-08 RX ADMIN — IOPAMIDOL 6 ML: 612 INJECTION, SOLUTION INTRAVENOUS at 10:36

## 2023-06-08 RX ADMIN — OXYCODONE HYDROCHLORIDE 10 MG: 5 TABLET ORAL at 09:18

## 2023-06-08 RX ADMIN — DIAZEPAM 10 MG: 5 TABLET ORAL at 09:18

## 2023-06-08 ASSESSMENT — PAIN - FUNCTIONAL ASSESSMENT: PAIN_FUNCTIONAL_ASSESSMENT: NONE - DENIES PAIN

## 2023-06-08 NOTE — OR NURSING
Recovery period without difficulty. Pt alert and oriented and denies pain. Dressing is clean, dry, and intact. Reviewed discharge instructions with patient and [address], both verbalized understanding.

## 2023-06-08 NOTE — DISCHARGE INSTRUCTIONS
If you have any questions about your procedure, please call the Interventional Radiology department at [phone]. After business hours (5pm) and weekends, call the answering service at [phone] and ask for the Radiologist on call to be paged. Si tiene Preguntas acerca del procedimiento, por favor llame al departamento de Radiología Intervencional al [phone]. Después de horas de oficina (5 pm) y los fines de [place], llamar al Merline Maria al [phone] y pregunte por el Radiologo de [place]. Interventional Radiology General Nurse Discharge    After general anesthesia or intravenous sedation, for 24 hours or while taking prescription Narcotics:  Limit your activities  Do not drive and operate hazardous machinery  Do not make important personal or business decisions  Do  not drink alcoholic beverages  If you have not urinated within 8 hours after discharge, please contact your surgeon on call. * Please give a list of your current medications to your Primary Care Provider. * Please update this list whenever your medications are discontinued, doses are     changed, or new medications (including over-the-counter products) are added. * Please carry medication information at all times in case of emergency situations. These are general instructions for a healthy lifestyle:    No smoking/ No tobacco products/ Avoid exposure to second hand smoke  Surgeon General's Warning:  Quitting smoking now greatly reduces serious risk to your health.     Obesity, smoking, and sedentary lifestyle greatly increases your risk for illness  A healthy diet, regular physical exercise & weight monitoring are important for maintaining a healthy lifestyle    You may be retaining fluid if you have a history of heart failure or if you experience any of the following symptoms:  Weight gain of 3 pounds or more overnight or 5 pounds in a week, increased swelling in our hands or feet or shortness of breath

## 2023-06-08 NOTE — OP NOTE
Department of Interventional Radiology  [phone]        Interventional Radiology Brief Procedure Note    Patient: Marianna Higgins MRN: [id number]  SSN: [id number]    YOB: 1940  Age: [de-identified] y.o.   Sex: male      Date of Procedure: 6/8/2023    Pre-Procedure Diagnosis: ureteral stricture    Post-Procedure Diagnosis: SAME    Procedure(s): Percutaneous Nephrostomy Tube Exchange    Brief Description of Procedure: Fluoro guided ureteral stent change    Performed By: Bjorn Gonzalez MD     Assistants: None    Anesthesia:None    Estimated Blood Loss: None    Specimens:  None    Implants:  Nephro-Ureteral Drain         Complications: None         Follow Up: 3 months    Signed By: Bjorn Gonzalez MD     June 8, 2023

## 2023-07-14 DIAGNOSIS — I10 ESSENTIAL (PRIMARY) HYPERTENSION: ICD-10-CM

## 2023-07-14 DIAGNOSIS — I25.810 CORONARY ARTERY DISEASE INVOLVING CORONARY BYPASS GRAFT OF NATIVE HEART WITHOUT ANGINA PECTORIS: ICD-10-CM

## 2023-07-14 RX ORDER — LOSARTAN POTASSIUM 100 MG/1
TABLET ORAL
Qty: 90 TABLET | Refills: 1 | OUTPATIENT
Start: 2023-07-14

## 2023-07-28 DIAGNOSIS — I10 ESSENTIAL (PRIMARY) HYPERTENSION: ICD-10-CM

## 2023-07-28 DIAGNOSIS — I25.810 CORONARY ARTERY DISEASE INVOLVING CORONARY BYPASS GRAFT OF NATIVE HEART WITHOUT ANGINA PECTORIS: ICD-10-CM

## 2023-07-28 DIAGNOSIS — F32.5 MAJOR DEPRESSIVE DISORDER WITH SINGLE EPISODE, IN FULL REMISSION (HCC): ICD-10-CM

## 2023-07-28 RX ORDER — LOSARTAN POTASSIUM 100 MG/1
100 TABLET ORAL DAILY
Qty: 7 TABLET | Refills: 0 | Status: SHIPPED | OUTPATIENT
Start: 2023-07-28 | End: 2023-08-04 | Stop reason: SDUPTHER

## 2023-07-28 RX ORDER — FLUOXETINE HYDROCHLORIDE 20 MG/1
20 CAPSULE ORAL DAILY
Qty: 90 CAPSULE | Refills: 1 | OUTPATIENT
Start: 2023-07-28

## 2023-07-28 RX ORDER — FLUOXETINE HYDROCHLORIDE 20 MG/1
20 CAPSULE ORAL DAILY
Qty: 7 CAPSULE | Refills: 0 | Status: SHIPPED | OUTPATIENT
Start: 2023-07-28 | End: 2023-08-04 | Stop reason: SDUPTHER

## 2023-07-28 NOTE — TELEPHONE ENCOUNTER
Patient's wife called stating that he was needing refills on the pended medications. I did let her know that he needs to be seen. Patient has appointment on Monday for 7/31/2023 at 8:20-told her to be here by 8 for this appointment.

## 2023-08-04 ENCOUNTER — OFFICE VISIT (OUTPATIENT)
Dept: INTERNAL MEDICINE CLINIC | Facility: CLINIC | Age: 83
End: 2023-08-04

## 2023-08-04 VITALS
TEMPERATURE: 98.1 F | HEIGHT: 70 IN | OXYGEN SATURATION: 97 % | WEIGHT: 164 LBS | SYSTOLIC BLOOD PRESSURE: 108 MMHG | DIASTOLIC BLOOD PRESSURE: 74 MMHG | HEART RATE: 87 BPM | BODY MASS INDEX: 23.48 KG/M2

## 2023-08-04 DIAGNOSIS — F32.5 MAJOR DEPRESSIVE DISORDER WITH SINGLE EPISODE, IN FULL REMISSION (HCC): ICD-10-CM

## 2023-08-04 DIAGNOSIS — I48.3 TYPICAL ATRIAL FLUTTER (HCC): ICD-10-CM

## 2023-08-04 DIAGNOSIS — I10 ESSENTIAL (PRIMARY) HYPERTENSION: ICD-10-CM

## 2023-08-04 DIAGNOSIS — N18.31 STAGE 3A CHRONIC KIDNEY DISEASE (HCC): ICD-10-CM

## 2023-08-04 DIAGNOSIS — E78.2 MIXED HYPERLIPIDEMIA: ICD-10-CM

## 2023-08-04 DIAGNOSIS — C67.9 MALIGNANT NEOPLASM OF URINARY BLADDER, UNSPECIFIED SITE (HCC): ICD-10-CM

## 2023-08-04 DIAGNOSIS — R73.03 PREDIABETES: ICD-10-CM

## 2023-08-04 DIAGNOSIS — I25.810 CORONARY ARTERY DISEASE INVOLVING CORONARY BYPASS GRAFT OF NATIVE HEART WITHOUT ANGINA PECTORIS: ICD-10-CM

## 2023-08-04 DIAGNOSIS — I25.810 CORONARY ARTERY DISEASE INVOLVING CORONARY BYPASS GRAFT OF NATIVE HEART WITHOUT ANGINA PECTORIS: Primary | ICD-10-CM

## 2023-08-04 LAB
ALBUMIN SERPL-MCNC: 3.9 G/DL (ref 3.2–4.6)
ALBUMIN/GLOB SERPL: 1.1 (ref 0.4–1.6)
ALP SERPL-CCNC: 68 U/L (ref 50–136)
ALT SERPL-CCNC: 20 U/L (ref 12–65)
ANION GAP SERPL CALC-SCNC: 7 MMOL/L (ref 2–11)
APPEARANCE UR: ABNORMAL
AST SERPL-CCNC: 16 U/L (ref 15–37)
BACTERIA URNS QL MICRO: ABNORMAL /HPF
BASOPHILS # BLD: 0 K/UL (ref 0–0.2)
BASOPHILS NFR BLD: 1 % (ref 0–2)
BILIRUB DIRECT SERPL-MCNC: 0.1 MG/DL
BILIRUB SERPL-MCNC: 0.6 MG/DL (ref 0.2–1.1)
BILIRUB UR QL: NEGATIVE
BUN SERPL-MCNC: 19 MG/DL (ref 8–23)
CALCIUM SERPL-MCNC: 9.2 MG/DL (ref 8.3–10.4)
CASTS URNS QL MICRO: ABNORMAL /LPF
CHLORIDE SERPL-SCNC: 109 MMOL/L (ref 101–110)
CHOLEST SERPL-MCNC: 129 MG/DL
CO2 SERPL-SCNC: 25 MMOL/L (ref 21–32)
COLOR UR: YELLOW
CREAT SERPL-MCNC: 1.6 MG/DL (ref 0.8–1.5)
CRYSTALS URNS QL MICRO: 0 /LPF
DIFFERENTIAL METHOD BLD: NORMAL
EOSINOPHIL # BLD: 0.3 K/UL (ref 0–0.8)
EOSINOPHIL NFR BLD: 5 % (ref 0.5–7.8)
EPI CELLS #/AREA URNS HPF: ABNORMAL /HPF
ERYTHROCYTE [DISTWIDTH] IN BLOOD BY AUTOMATED COUNT: 13.7 % (ref 11.9–14.6)
EST. AVERAGE GLUCOSE BLD GHB EST-MCNC: 126 MG/DL
GLOBULIN SER CALC-MCNC: 3.5 G/DL (ref 2.8–4.5)
GLUCOSE SERPL-MCNC: 102 MG/DL (ref 65–100)
GLUCOSE UR STRIP.AUTO-MCNC: NEGATIVE MG/DL
HBA1C MFR BLD: 6 % (ref 4.8–5.6)
HCT VFR BLD AUTO: 41.6 % (ref 41.1–50.3)
HDLC SERPL-MCNC: 47 MG/DL (ref 40–60)
HDLC SERPL: 2.7
HGB BLD-MCNC: 13.7 G/DL (ref 13.6–17.2)
HGB UR QL STRIP: ABNORMAL
IMM GRANULOCYTES # BLD AUTO: 0 K/UL (ref 0–0.5)
IMM GRANULOCYTES NFR BLD AUTO: 0 % (ref 0–5)
KETONES UR QL STRIP.AUTO: NEGATIVE MG/DL
LDLC SERPL CALC-MCNC: 72 MG/DL
LEUKOCYTE ESTERASE UR QL STRIP.AUTO: ABNORMAL
LYMPHOCYTES # BLD: 2.6 K/UL (ref 0.5–4.6)
LYMPHOCYTES NFR BLD: 39 % (ref 13–44)
MCH RBC QN AUTO: 30.9 PG (ref 26.1–32.9)
MCHC RBC AUTO-ENTMCNC: 32.9 G/DL (ref 31.4–35)
MCV RBC AUTO: 93.7 FL (ref 82–102)
MONOCYTES # BLD: 0.6 K/UL (ref 0.1–1.3)
MONOCYTES NFR BLD: 9 % (ref 4–12)
MUCOUS THREADS URNS QL MICRO: 0 /LPF
NEUTS SEG # BLD: 3 K/UL (ref 1.7–8.2)
NEUTS SEG NFR BLD: 46 % (ref 43–78)
NITRITE UR QL STRIP.AUTO: NEGATIVE
NRBC # BLD: 0 K/UL (ref 0–0.2)
PH UR STRIP: 6.5 (ref 5–9)
PLATELET # BLD AUTO: 163 K/UL (ref 150–450)
PMV BLD AUTO: 10.4 FL (ref 9.4–12.3)
POTASSIUM SERPL-SCNC: 4.1 MMOL/L (ref 3.5–5.1)
PROT SERPL-MCNC: 7.4 G/DL (ref 6.3–8.2)
PROT UR STRIP-MCNC: 100 MG/DL
RBC # BLD AUTO: 4.44 M/UL (ref 4.23–5.6)
RBC #/AREA URNS HPF: ABNORMAL /HPF
SODIUM SERPL-SCNC: 141 MMOL/L (ref 133–143)
SP GR UR REFRACTOMETRY: 1.02 (ref 1–1.02)
TRIGL SERPL-MCNC: 50 MG/DL (ref 35–150)
TSH, 3RD GENERATION: 1.18 UIU/ML (ref 0.36–3.74)
UROBILINOGEN UR QL STRIP.AUTO: 0.2 EU/DL (ref 0.2–1)
VLDLC SERPL CALC-MCNC: 10 MG/DL (ref 6–23)
WBC # BLD AUTO: 6.5 K/UL (ref 4.3–11.1)
WBC URNS QL MICRO: >100 /HPF

## 2023-08-04 RX ORDER — FLUOXETINE HYDROCHLORIDE 20 MG/1
20 CAPSULE ORAL DAILY
Qty: 90 CAPSULE | Refills: 0 | Status: SHIPPED | OUTPATIENT
Start: 2023-08-04

## 2023-08-04 RX ORDER — LOSARTAN POTASSIUM 100 MG/1
100 TABLET ORAL DAILY
Qty: 90 TABLET | Refills: 0 | Status: SHIPPED | OUTPATIENT
Start: 2023-08-04

## 2023-08-04 SDOH — ECONOMIC STABILITY: FOOD INSECURITY: WITHIN THE PAST 12 MONTHS, THE FOOD YOU BOUGHT JUST DIDN'T LAST AND YOU DIDN'T HAVE MONEY TO GET MORE.: NEVER TRUE

## 2023-08-04 SDOH — ECONOMIC STABILITY: INCOME INSECURITY: HOW HARD IS IT FOR YOU TO PAY FOR THE VERY BASICS LIKE FOOD, HOUSING, MEDICAL CARE, AND HEATING?: NOT HARD AT ALL

## 2023-08-04 SDOH — ECONOMIC STABILITY: FOOD INSECURITY: WITHIN THE PAST 12 MONTHS, YOU WORRIED THAT YOUR FOOD WOULD RUN OUT BEFORE YOU GOT MONEY TO BUY MORE.: NEVER TRUE

## 2023-08-04 ASSESSMENT — PATIENT HEALTH QUESTIONNAIRE - PHQ9
SUM OF ALL RESPONSES TO PHQ QUESTIONS 1-9: 0
6. FEELING BAD ABOUT YOURSELF - OR THAT YOU ARE A FAILURE OR HAVE LET YOURSELF OR YOUR FAMILY DOWN: 0
SUM OF ALL RESPONSES TO PHQ QUESTIONS 1-9: 0
SUM OF ALL RESPONSES TO PHQ QUESTIONS 1-9: 0
1. LITTLE INTEREST OR PLEASURE IN DOING THINGS: 0
2. FEELING DOWN, DEPRESSED OR HOPELESS: 0
4. FEELING TIRED OR HAVING LITTLE ENERGY: 0
3. TROUBLE FALLING OR STAYING ASLEEP: 0
8. MOVING OR SPEAKING SO SLOWLY THAT OTHER PEOPLE COULD HAVE NOTICED. OR THE OPPOSITE, BEING SO FIGETY OR RESTLESS THAT YOU HAVE BEEN MOVING AROUND A LOT MORE THAN USUAL: 0
7. TROUBLE CONCENTRATING ON THINGS, SUCH AS READING THE NEWSPAPER OR WATCHING TELEVISION: 0
10. IF YOU CHECKED OFF ANY PROBLEMS, HOW DIFFICULT HAVE THESE PROBLEMS MADE IT FOR YOU TO DO YOUR WORK, TAKE CARE OF THINGS AT HOME, OR GET ALONG WITH OTHER PEOPLE: 0
SUM OF ALL RESPONSES TO PHQ9 QUESTIONS 1 & 2: 0
SUM OF ALL RESPONSES TO PHQ QUESTIONS 1-9: 0
5. POOR APPETITE OR OVEREATING: 0

## 2023-08-04 ASSESSMENT — ANXIETY QUESTIONNAIRES
6. BECOMING EASILY ANNOYED OR IRRITABLE: 0
4. TROUBLE RELAXING: 0
7. FEELING AFRAID AS IF SOMETHING AWFUL MIGHT HAPPEN: 0
GAD7 TOTAL SCORE: 0
5. BEING SO RESTLESS THAT IT IS HARD TO SIT STILL: 0
1. FEELING NERVOUS, ANXIOUS, OR ON EDGE: 0
3. WORRYING TOO MUCH ABOUT DIFFERENT THINGS: 0
IF YOU CHECKED OFF ANY PROBLEMS ON THIS QUESTIONNAIRE, HOW DIFFICULT HAVE THESE PROBLEMS MADE IT FOR YOU TO DO YOUR WORK, TAKE CARE OF THINGS AT HOME, OR GET ALONG WITH OTHER PEOPLE: NOT DIFFICULT AT ALL
2. NOT BEING ABLE TO STOP OR CONTROL WORRYING: 0

## 2023-08-04 ASSESSMENT — ENCOUNTER SYMPTOMS
BLOOD IN STOOL: 0
SHORTNESS OF BREATH: 0

## 2023-08-04 NOTE — PROGRESS NOTES
Moi Minor M.D. Internal Medicine  [facility]  [address] [place] [address], [address] [place] [address]  Phone: [phone] Fax: [phone]    Hypertension  This is a chronic problem. The current episode started more than 1 year ago. The problem has been waxing and waning since onset. The problem is controlled. Pertinent negatives include no chest pain or shortness of breath. There are no associated agents to hypertension. Risk factors for coronary artery disease include male gender and dyslipidemia. Past treatments include angiotensin blockers. The current treatment provides moderate improvement. There are no compliance problems. Quita Mcneil is a 82 y.o. White (non-) male. I last saw him 2/2023 and recommended 3 month f/u. Current Outpatient Medications   Medication Sig Dispense Refill    losartan (COZAAR) 100 MG tablet Take 1 tablet by mouth daily 7 tablet 0    FLUoxetine (PROZAC) 20 MG capsule Take 1 capsule by mouth daily 7 capsule 0    simvastatin (ZOCOR) 80 MG tablet Take 1 tablet by mouth nightly 90 tablet 1    aspirin 81 MG EC tablet Take 1 tablet by mouth daily       No current facility-administered medications for this visit.      No Known Allergies    Past Medical History:   Diagnosis Date    Acute encephalopathy 6/18/2021    Arthritis     OA- bilat knees    Bladder absent     stoma present    Bladder cancer (HCC)     CAD (coronary artery disease)     CABG x 4 in 2004 - no damage- emergenc CABG- Cath 1/2011- total occlusion ramus, total mid occlusion left anterior descending, 50-70% stenosis of mid right coronary artery- \"Normal LEF\"    Cancer ([address]) 1/1/2008    bladder- chemo in bladder then 7/11 bladder removed    CRI (chronic renal insufficiency), stage 3 (moderate) (HCC)     Depression     Family history of diabetes mellitus     Heart murmur     mild mitral regurgitation    Hypercholesteremia     Hypertension     Infection     to s/p hernia wound 9/2010    Other

## 2023-09-20 DIAGNOSIS — I25.810 CORONARY ARTERY DISEASE INVOLVING CORONARY BYPASS GRAFT OF NATIVE HEART WITHOUT ANGINA PECTORIS: ICD-10-CM

## 2023-09-20 DIAGNOSIS — E78.2 MIXED HYPERLIPIDEMIA: ICD-10-CM

## 2023-09-20 RX ORDER — SIMVASTATIN 80 MG
80 TABLET ORAL
Qty: 90 TABLET | Refills: 0 | Status: SHIPPED | OUTPATIENT
Start: 2023-09-20

## 2023-10-16 DIAGNOSIS — F32.5 MAJOR DEPRESSIVE DISORDER WITH SINGLE EPISODE, IN FULL REMISSION (HCC): ICD-10-CM

## 2023-10-16 DIAGNOSIS — I25.810 CORONARY ARTERY DISEASE INVOLVING CORONARY BYPASS GRAFT OF NATIVE HEART WITHOUT ANGINA PECTORIS: ICD-10-CM

## 2023-10-16 DIAGNOSIS — I10 ESSENTIAL (PRIMARY) HYPERTENSION: ICD-10-CM

## 2023-10-16 RX ORDER — LOSARTAN POTASSIUM 100 MG/1
100 TABLET ORAL DAILY
Qty: 30 TABLET | Refills: 0 | Status: SHIPPED | OUTPATIENT
Start: 2023-10-16

## 2023-10-16 RX ORDER — FLUOXETINE HYDROCHLORIDE 20 MG/1
20 CAPSULE ORAL DAILY
Qty: 30 CAPSULE | Refills: 0 | Status: SHIPPED | OUTPATIENT
Start: 2023-10-16

## 2023-11-27 DIAGNOSIS — I25.810 CORONARY ARTERY DISEASE INVOLVING CORONARY BYPASS GRAFT OF NATIVE HEART WITHOUT ANGINA PECTORIS: ICD-10-CM

## 2023-11-27 DIAGNOSIS — E78.2 MIXED HYPERLIPIDEMIA: ICD-10-CM

## 2023-11-28 RX ORDER — SIMVASTATIN 80 MG
TABLET ORAL
Qty: 90 TABLET | Refills: 3 | Status: SHIPPED | OUTPATIENT
Start: 2023-11-28

## 2023-11-28 NOTE — TELEPHONE ENCOUNTER
Requested Prescriptions     Pending Prescriptions Disp Refills    simvastatin (ZOCOR) 80 MG tablet [Pharmacy Med Name: SIMVASTATIN 80 MG Tablet] 90 tablet 3     Sig: TAKE 1 TABLET EVERY NIGHT AT BEDTIME     Verified rx. Last OV 04/11/2023. Erx to pharm on file.

## 2023-12-07 DIAGNOSIS — F32.5 MAJOR DEPRESSIVE DISORDER WITH SINGLE EPISODE, IN FULL REMISSION (HCC): ICD-10-CM

## 2023-12-07 DIAGNOSIS — I10 ESSENTIAL (PRIMARY) HYPERTENSION: ICD-10-CM

## 2023-12-07 DIAGNOSIS — I25.810 CORONARY ARTERY DISEASE INVOLVING CORONARY BYPASS GRAFT OF NATIVE HEART WITHOUT ANGINA PECTORIS: ICD-10-CM

## 2023-12-07 RX ORDER — FLUOXETINE HYDROCHLORIDE 20 MG/1
20 CAPSULE ORAL DAILY
Qty: 60 CAPSULE | Refills: 3 | OUTPATIENT
Start: 2023-12-07

## 2023-12-07 RX ORDER — LOSARTAN POTASSIUM 100 MG/1
100 TABLET ORAL DAILY
Qty: 60 TABLET | Refills: 3 | OUTPATIENT
Start: 2023-12-07

## 2024-05-08 ENCOUNTER — HOSPITAL ENCOUNTER (INPATIENT)
Age: 84
LOS: 3 days | Discharge: HOME | DRG: 389 | End: 2024-05-13
Attending: EMERGENCY MEDICINE | Admitting: FAMILY MEDICINE
Payer: MEDICARE

## 2024-05-08 ENCOUNTER — APPOINTMENT (OUTPATIENT)
Dept: CT IMAGING | Age: 84
DRG: 389 | End: 2024-05-08
Payer: MEDICARE

## 2024-05-08 DIAGNOSIS — C67.9 MALIGNANT NEOPLASM OF URINARY BLADDER, UNSPECIFIED SITE (HCC): ICD-10-CM

## 2024-05-08 DIAGNOSIS — K56.41 FECAL IMPACTION IN RECTUM (HCC): Primary | ICD-10-CM

## 2024-05-08 DIAGNOSIS — K56.41 FECAL IMPACTION (HCC): ICD-10-CM

## 2024-05-08 LAB
ALBUMIN SERPL-MCNC: 4.3 G/DL (ref 3.2–4.6)
ALBUMIN/GLOB SERPL: 1.1 (ref 1–1.9)
ALP SERPL-CCNC: 67 U/L (ref 40–129)
ALT SERPL-CCNC: 16 U/L (ref 12–65)
ANION GAP SERPL CALC-SCNC: 13 MMOL/L (ref 9–18)
AST SERPL-CCNC: 44 U/L (ref 15–37)
BASOPHILS # BLD: 0 K/UL (ref 0–0.2)
BASOPHILS NFR BLD: 1 % (ref 0–2)
BILIRUB SERPL-MCNC: 0.4 MG/DL (ref 0–1.2)
BUN SERPL-MCNC: 23 MG/DL (ref 8–23)
CALCIUM SERPL-MCNC: 10 MG/DL (ref 8.8–10.2)
CHLORIDE SERPL-SCNC: 102 MMOL/L (ref 98–107)
CO2 SERPL-SCNC: 26 MMOL/L (ref 20–28)
CREAT SERPL-MCNC: 1.51 MG/DL (ref 0.8–1.3)
DIFFERENTIAL METHOD BLD: NORMAL
EOSINOPHIL # BLD: 0.1 K/UL (ref 0–0.8)
EOSINOPHIL NFR BLD: 2 % (ref 0.5–7.8)
ERYTHROCYTE [DISTWIDTH] IN BLOOD BY AUTOMATED COUNT: 13.7 % (ref 11.9–14.6)
GLOBULIN SER CALC-MCNC: 4 G/DL (ref 2.3–3.5)
GLUCOSE SERPL-MCNC: 112 MG/DL (ref 70–99)
HCT VFR BLD AUTO: 42.6 % (ref 41.1–50.3)
HGB BLD-MCNC: 14.3 G/DL (ref 13.6–17.2)
IMM GRANULOCYTES # BLD AUTO: 0 K/UL (ref 0–0.5)
IMM GRANULOCYTES NFR BLD AUTO: 0 % (ref 0–5)
LYMPHOCYTES # BLD: 2.6 K/UL (ref 0.5–4.6)
LYMPHOCYTES NFR BLD: 34 % (ref 13–44)
MCH RBC QN AUTO: 30.2 PG (ref 26.1–32.9)
MCHC RBC AUTO-ENTMCNC: 33.6 G/DL (ref 31.4–35)
MCV RBC AUTO: 89.9 FL (ref 82–102)
MONOCYTES # BLD: 0.6 K/UL (ref 0.1–1.3)
MONOCYTES NFR BLD: 8 % (ref 4–12)
NEUTS SEG # BLD: 4.1 K/UL (ref 1.7–8.2)
NEUTS SEG NFR BLD: 55 % (ref 43–78)
NRBC # BLD: 0 K/UL (ref 0–0.2)
PLATELET # BLD AUTO: 219 K/UL (ref 150–450)
PMV BLD AUTO: 10.9 FL (ref 9.4–12.3)
POTASSIUM SERPL-SCNC: ABNORMAL MMOL/L (ref 3.5–5.1)
PROT SERPL-MCNC: 8.2 G/DL (ref 6.3–8.2)
RBC # BLD AUTO: 4.74 M/UL (ref 4.23–5.6)
SODIUM SERPL-SCNC: 141 MMOL/L (ref 136–145)
WBC # BLD AUTO: 7.5 K/UL (ref 4.3–11.1)

## 2024-05-08 PROCEDURE — 85025 COMPLETE CBC W/AUTO DIFF WBC: CPT

## 2024-05-08 PROCEDURE — 6360000004 HC RX CONTRAST MEDICATION: Performed by: EMERGENCY MEDICINE

## 2024-05-08 PROCEDURE — 2580000003 HC RX 258: Performed by: FAMILY MEDICINE

## 2024-05-08 PROCEDURE — G0378 HOSPITAL OBSERVATION PER HR: HCPCS

## 2024-05-08 PROCEDURE — 74177 CT ABD & PELVIS W/CONTRAST: CPT

## 2024-05-08 PROCEDURE — 80053 COMPREHEN METABOLIC PANEL: CPT

## 2024-05-08 PROCEDURE — 99285 EMERGENCY DEPT VISIT HI MDM: CPT

## 2024-05-08 PROCEDURE — 6370000000 HC RX 637 (ALT 250 FOR IP): Performed by: FAMILY MEDICINE

## 2024-05-08 RX ORDER — ACETAMINOPHEN 325 MG/1
650 TABLET ORAL EVERY 6 HOURS PRN
Status: DISCONTINUED | OUTPATIENT
Start: 2024-05-08 | End: 2024-05-13 | Stop reason: HOSPADM

## 2024-05-08 RX ORDER — FLUOXETINE HYDROCHLORIDE 20 MG/1
20 CAPSULE ORAL DAILY
Status: DISCONTINUED | OUTPATIENT
Start: 2024-05-09 | End: 2024-05-13 | Stop reason: HOSPADM

## 2024-05-08 RX ORDER — SODIUM CHLORIDE 9 MG/ML
INJECTION, SOLUTION INTRAVENOUS PRN
Status: DISCONTINUED | OUTPATIENT
Start: 2024-05-08 | End: 2024-05-13 | Stop reason: HOSPADM

## 2024-05-08 RX ORDER — MAGNESIUM SULFATE IN WATER 40 MG/ML
2000 INJECTION, SOLUTION INTRAVENOUS PRN
Status: DISCONTINUED | OUTPATIENT
Start: 2024-05-08 | End: 2024-05-13 | Stop reason: HOSPADM

## 2024-05-08 RX ORDER — LOSARTAN POTASSIUM 50 MG/1
100 TABLET ORAL DAILY
Status: DISCONTINUED | OUTPATIENT
Start: 2024-05-09 | End: 2024-05-13 | Stop reason: HOSPADM

## 2024-05-08 RX ORDER — LACTULOSE 10 G/15ML
20 SOLUTION ORAL 3 TIMES DAILY
Status: DISCONTINUED | OUTPATIENT
Start: 2024-05-08 | End: 2024-05-13 | Stop reason: HOSPADM

## 2024-05-08 RX ORDER — ONDANSETRON 4 MG/1
4 TABLET, ORALLY DISINTEGRATING ORAL EVERY 8 HOURS PRN
Status: DISCONTINUED | OUTPATIENT
Start: 2024-05-08 | End: 2024-05-13 | Stop reason: HOSPADM

## 2024-05-08 RX ORDER — POTASSIUM CHLORIDE 20 MEQ/1
40 TABLET, EXTENDED RELEASE ORAL PRN
Status: DISCONTINUED | OUTPATIENT
Start: 2024-05-08 | End: 2024-05-13 | Stop reason: HOSPADM

## 2024-05-08 RX ORDER — POTASSIUM CHLORIDE 7.45 MG/ML
10 INJECTION INTRAVENOUS PRN
Status: DISCONTINUED | OUTPATIENT
Start: 2024-05-08 | End: 2024-05-13 | Stop reason: HOSPADM

## 2024-05-08 RX ORDER — 0.9 % SODIUM CHLORIDE 0.9 %
100 INTRAVENOUS SOLUTION INTRAVENOUS
Status: DISCONTINUED | OUTPATIENT
Start: 2024-05-08 | End: 2024-05-13 | Stop reason: HOSPADM

## 2024-05-08 RX ORDER — SODIUM CHLORIDE 0.9 % (FLUSH) 0.9 %
5-40 SYRINGE (ML) INJECTION EVERY 12 HOURS SCHEDULED
Status: DISCONTINUED | OUTPATIENT
Start: 2024-05-08 | End: 2024-05-13 | Stop reason: HOSPADM

## 2024-05-08 RX ORDER — ONDANSETRON 2 MG/ML
4 INJECTION INTRAMUSCULAR; INTRAVENOUS EVERY 6 HOURS PRN
Status: DISCONTINUED | OUTPATIENT
Start: 2024-05-08 | End: 2024-05-13 | Stop reason: HOSPADM

## 2024-05-08 RX ORDER — ATORVASTATIN CALCIUM 40 MG/1
40 TABLET, FILM COATED ORAL DAILY
Status: DISCONTINUED | OUTPATIENT
Start: 2024-05-09 | End: 2024-05-13 | Stop reason: HOSPADM

## 2024-05-08 RX ORDER — SENNA AND DOCUSATE SODIUM 50; 8.6 MG/1; MG/1
2 TABLET, FILM COATED ORAL 2 TIMES DAILY
Status: DISCONTINUED | OUTPATIENT
Start: 2024-05-08 | End: 2024-05-13 | Stop reason: HOSPADM

## 2024-05-08 RX ORDER — ACETAMINOPHEN 650 MG/1
650 SUPPOSITORY RECTAL EVERY 6 HOURS PRN
Status: DISCONTINUED | OUTPATIENT
Start: 2024-05-08 | End: 2024-05-13 | Stop reason: HOSPADM

## 2024-05-08 RX ORDER — ASPIRIN 81 MG/1
81 TABLET ORAL DAILY
Status: DISCONTINUED | OUTPATIENT
Start: 2024-05-09 | End: 2024-05-13 | Stop reason: HOSPADM

## 2024-05-08 RX ORDER — SODIUM CHLORIDE 0.9 % (FLUSH) 0.9 %
10 SYRINGE (ML) INJECTION
Status: DISCONTINUED | OUTPATIENT
Start: 2024-05-08 | End: 2024-05-13 | Stop reason: HOSPADM

## 2024-05-08 RX ORDER — SODIUM CHLORIDE 0.9 % (FLUSH) 0.9 %
5-40 SYRINGE (ML) INJECTION PRN
Status: DISCONTINUED | OUTPATIENT
Start: 2024-05-08 | End: 2024-05-13 | Stop reason: HOSPADM

## 2024-05-08 RX ORDER — BISACODYL 10 MG
10 SUPPOSITORY, RECTAL RECTAL DAILY
Status: DISPENSED | OUTPATIENT
Start: 2024-05-08 | End: 2024-05-11

## 2024-05-08 RX ORDER — POLYETHYLENE GLYCOL 3350 17 G/17G
17 POWDER, FOR SOLUTION ORAL DAILY PRN
Status: DISCONTINUED | OUTPATIENT
Start: 2024-05-08 | End: 2024-05-13 | Stop reason: HOSPADM

## 2024-05-08 RX ADMIN — BISACODYL 10 MG: 10 SUPPOSITORY RECTAL at 10:45

## 2024-05-08 RX ADMIN — DOCUSATE SODIUM 50 MG AND SENNOSIDES 8.6 MG 2 TABLET: 8.6; 5 TABLET, FILM COATED ORAL at 10:44

## 2024-05-08 RX ADMIN — LACTULOSE 20 G: 10 SOLUTION ORAL at 13:39

## 2024-05-08 RX ADMIN — SODIUM CHLORIDE, PRESERVATIVE FREE 10 ML: 5 INJECTION INTRAVENOUS at 10:45

## 2024-05-08 RX ADMIN — LACTULOSE 20 G: 10 SOLUTION ORAL at 10:44

## 2024-05-08 RX ADMIN — IOPAMIDOL 100 ML: 755 INJECTION, SOLUTION INTRAVENOUS at 02:14

## 2024-05-08 RX ADMIN — SODIUM CHLORIDE, PRESERVATIVE FREE 10 ML: 5 INJECTION INTRAVENOUS at 21:49

## 2024-05-08 ASSESSMENT — PAIN - FUNCTIONAL ASSESSMENT: PAIN_FUNCTIONAL_ASSESSMENT: 0-10

## 2024-05-08 ASSESSMENT — PAIN SCALES - GENERAL
PAINLEVEL_OUTOF10: 0
PAINLEVEL_OUTOF10: 4
PAINLEVEL_OUTOF10: 0

## 2024-05-08 NOTE — ED TRIAGE NOTES
Patient arrives via EMS with constipation that has been on going for about 3 days. Patient has ileostomy and has been attempting to relieve himself with no results. Patient attempted to go to a skilled nursing facility thinking it was a hospital, ems picked patient up from there. Patient alert and oriented to person, place, situation.

## 2024-05-08 NOTE — ACP (ADVANCE CARE PLANNING)
Advance Care Planning   Healthcare Decision Maker:    Primary Decision Maker: Pierce Clark - Child - [phone]    Secondary Decision Maker: Vee Aguilera - Lay Caregiver - [phone]    Click here to complete Healthcare Decision Makers including selection of the Healthcare Decision Maker Relationship (ie \"Primary\").  Today we documented Decision Maker(s) consistent with Legal Next of Kin hierarchy.

## 2024-05-08 NOTE — H&P
Hospitalist History and Physical   Admit Date:  2024 12:33 AM   Name:  Kevin Clark   Age:  83 y.o.  Sex:  male  :  1940   MRN:  [id number]   Room:  [facility]/    Presenting/Chief Complaint: Constipation     Reason(s) for Admission: No admission diagnoses are documented for this encounter.     History of Present Illness:   Kevin Clark is a 83 y.o. male with medical history of aflutter, CAD s/p CABG, CKD3 who presented with constipation for a week or more. Patient has an ileostomy and has been attempting to relief himself with no result. He is overall a poor historian and by himself at bedside so history was limited. Stated \"there is a hard ball down there that's stuck and I'm not able to push out.\"  Reported some lower quadrant abdominal discomfort.  He was alert and oriented to person, , place but not current time or situation. He denies fever, chills, SOB, chest pain, nausea vomiting    In ED, VSS.  CBC/CMP at baseline.  CTAP shows surgical changes of cystectomy with urinary diversion are noted, interval left double-J stent noted in appropriate position; mild left hydronephrosis; stranding and hyperemia involving the left renal collecting system and left ureter suggestive of pyelitis/ureteritis; fecal impaction and stercoral colitis involving the rectum; mild cholelithiasis; right abdominal wall ostomy with parastomal hernia similar to prior; fat-containing left inguinal hernia.  Manual disimpaction was attempted but unsuccessful. Milk of molasses enema given without improvement      Assessment & Plan:     Fecal impaction  Stercoral colitis   CTAP shows fecal impaction and stercoral colitis involving the rectum  Supportive care  Monitor intake and output  Start bowel regimen  May need GI involved if unimproved    CAD s/p CABG  A flutter  Patient follows [facility] Dr. Rojo outpatient.  History of bleeding in urostomy with Eliquis and also due to medication cost, patient

## 2024-05-08 NOTE — ED NOTES
TRANSFER - OUT REPORT:    Verbal report given to STAR Vilchis on Kevin Clark  being transferred to [facility] for routine progression of patient care       Report consisted of patient's Situation, Background, Assessment and   Recommendations(SBAR).     Information from the following report(s) Nurse Handoff Report was reviewed with the receiving nurse.    Dean Fall Assessment:    Presents to emergency department  because of falls (Syncope, seizure, or loss of consciousness): No  Age > 70: Yes  Altered Mental Status, Intoxication with alcohol or substance confusion (Disorientation, impaired judgment, poor safety awaremess, or inability to follow instructions): No  Impaired Mobility: Ambulates or transfers with assistive devices or assistance; Unable to ambulate or transer.: No  Nursing Judgement: No          Lines:   Peripheral IV 05/08/24 Distal;Left;Anterior Cephalic (Active)        Opportunity for questions and clarification was provided.      Patient transported with:  Elaina Corea RN  05/08/24 1180

## 2024-05-08 NOTE — ACP (ADVANCE CARE PLANNING)
[facility]  At the heart of better care     Advance Care Planning   Admit Date:  2024 12:33 AM   Name:  Kevin Clark   Age:  83 y.o.  Sex:  male  :  1940   MRN:  [id number]   Room:  [place] [address]    Kevin Clark has capacity to make his own decisions:   Yes to basic decision but no with complex medical decision making    Patient is a poor historian, alert and oriented to person, , place but not current year or president.  Able to tell me that he does not have a living well and does not have a designated healthcare power of .  Stated that he has 2 children but does not trust them.  He has a caregiver Vee that he trusts.  He stated that in the event of cardiac arrest, he would like to try a time-limited trial with full code but does not want to be dependent on the ventilator.  All questions answered.    Patient or surrogate consented to discussion of the current conditions, workup, management plans, prognosis, and the risk for further deterioration.  Time spent: 17 minutes in direct discussion.      Signed:  Sol Rasmussen DO

## 2024-05-08 NOTE — ED PROVIDER NOTES
1.  Surgical changes of cystectomy with urinary diversion are noted.   Interval left double-J stent noted which appears appropriately   positioned.  There is mild left hydronephrosis.  There stranding and   hyperemia involving the left renal collecting system and left ureter   suggestive of pyelitis/ureteritis.   2.  There is fecal impaction and stercoral colitis involving the   rectum.   3.  There is mild cholelithiasis.   4.  Right abdominal wall ostomy with parastomal hernia containing   large bowel noted, similar to prior.   5.  There is a fat-containing left inguinal hernia.         Automatic exposure control was used as a dose lowering technique.      Radiation Dose: CTDI is 9.65 mGy. DLP is 605.19 mGy-cm.      Contrast Type: iopamidol (ISOVUE-370) 76 . Contrast Volume: 100 mL      Report signed on 05/08/2024 (03:11 Eastern Time)   Signed by: Olivia Sanchez M.D.   Reading Location: [id number]                       ED Course as of 05/08/24 0705   Wed May 08, 2024   0320 Patient's CT shows evidence of constipation with stercoral colitis and impaction.  Will order a medical molasses enema.  Will also check a urinalysis due to evidence of ureteritis on CT. [CW]   0544 Patient has received the enema.  He has had a few small firm stool balls relieved but is still having a lot of rectal pressure.  I performed a rectal exam at this time and there is a stool ball at the tip of my finger but I cannot disimpact this.  I have recommended admission to the hospital which patient is agreeable to [CW]      ED Course User Index  [CW] Harish Matthews MD       The diagnosis and plan as well as the results of any testing and treatments today in the department were communicated to the patient and/or their family/caregiver (if present).  The patient/caregiver verbalized understanding and realize that they are being admitted to the hospital for further evaluation and treatment.  All questions were answered.      ICD-10-CM    1.

## 2024-05-08 NOTE — CARE COORDINATION
Chart review complete, CM met with pt at bedside, son with pt but not in room at this time. Pt lives alone in own home and states he has a friend Vee that helps him pay his bills and with other needs as needed, per pt she is caregiver but he does not pay her.  Pt states he is normally independent with ADLS and drives. States has a Rolator that he uses to go out to get his mail.  NO anticipated dc needs noted at this time, pt plans to return home at time of dc.  Demographics, insurance and PCP confirmed.    CM staff will remain available to assist as needed with dc needs.       05/08/24 0902   Service Assessment   Patient Orientation Alert and Oriented   Cognition Alert   History Provided By Patient   Primary Caregiver Self   Accompanied By/Relationship son Jamie   Support Systems Family Members;Friends/Neighbors   Patient's Healthcare Decision Maker is: Legal Next of Kin   PCP Verified by CM Yes  (Dr Bismark Flores last visit August 2023)   Last Visit to PCP Within last year   Prior Functional Level Independent in ADLs/IADLs   Current Functional Level Independent in ADLs/IADLs   Can patient return to prior living arrangement Yes   Ability to make needs known: Good   Family able to assist with home care needs: Yes   Would you like for me to discuss the discharge plan with any other family members/significant others, and if so, who? Yes   Financial Resources Medicare   Community Resources None   CM/SW Referral Other (see comment)  (dispo)   Social/Functional History   Lives With Alone   Type of Home House   Home Layout One level   Home Access Level entry   Bathroom Shower/Tub Tub/Shower unit   Bathroom Toilet Standard   Bathroom Equipment None   Bathroom Accessibility Accessible   Home Equipment Rollator   Receives Help From Friend(s)   ADL Assistance Independent   Homemaking Assistance Independent   Ambulation Assistance Independent   Transfer Assistance Independent   Active  Yes   Occupation Retired   Discharge

## 2024-05-09 LAB
AMORPH CRY URNS QL MICRO: ABNORMAL
ANION GAP SERPL CALC-SCNC: 10 MMOL/L (ref 9–18)
APPEARANCE UR: ABNORMAL
BACTERIA URNS QL MICRO: ABNORMAL /HPF
BASOPHILS # BLD: 0 K/UL (ref 0–0.2)
BASOPHILS NFR BLD: 1 % (ref 0–2)
BILIRUB UR QL: NEGATIVE
BUN SERPL-MCNC: 18 MG/DL (ref 8–23)
CALCIUM SERPL-MCNC: 8.9 MG/DL (ref 8.8–10.2)
CASTS URNS QL MICRO: 0 /LPF
CHLORIDE SERPL-SCNC: 103 MMOL/L (ref 98–107)
CO2 SERPL-SCNC: 27 MMOL/L (ref 20–28)
COLOR UR: ABNORMAL
CREAT SERPL-MCNC: 1.45 MG/DL (ref 0.8–1.3)
CRYSTALS URNS QL MICRO: 0 /LPF
DIFFERENTIAL METHOD BLD: ABNORMAL
EOSINOPHIL # BLD: 0.3 K/UL (ref 0–0.8)
EOSINOPHIL NFR BLD: 4 % (ref 0.5–7.8)
EPI CELLS #/AREA URNS HPF: ABNORMAL /HPF
ERYTHROCYTE [DISTWIDTH] IN BLOOD BY AUTOMATED COUNT: 13.7 % (ref 11.9–14.6)
GLUCOSE SERPL-MCNC: 107 MG/DL (ref 70–99)
GLUCOSE UR STRIP.AUTO-MCNC: NEGATIVE MG/DL
HCT VFR BLD AUTO: 40.2 % (ref 41.1–50.3)
HGB BLD-MCNC: 13.2 G/DL (ref 13.6–17.2)
HGB UR QL STRIP: ABNORMAL
IMM GRANULOCYTES # BLD AUTO: 0 K/UL (ref 0–0.5)
IMM GRANULOCYTES NFR BLD AUTO: 0 % (ref 0–5)
KETONES UR QL STRIP.AUTO: NEGATIVE MG/DL
LEUKOCYTE ESTERASE UR QL STRIP.AUTO: ABNORMAL
LYMPHOCYTES # BLD: 2.4 K/UL (ref 0.5–4.6)
LYMPHOCYTES NFR BLD: 36 % (ref 13–44)
MCH RBC QN AUTO: 30.1 PG (ref 26.1–32.9)
MCHC RBC AUTO-ENTMCNC: 32.8 G/DL (ref 31.4–35)
MCV RBC AUTO: 91.6 FL (ref 82–102)
MONOCYTES # BLD: 0.7 K/UL (ref 0.1–1.3)
MONOCYTES NFR BLD: 10 % (ref 4–12)
MUCOUS THREADS URNS QL MICRO: ABNORMAL /LPF
NEUTS SEG # BLD: 3.2 K/UL (ref 1.7–8.2)
NEUTS SEG NFR BLD: 49 % (ref 43–78)
NITRITE UR QL STRIP.AUTO: POSITIVE
NRBC # BLD: 0 K/UL (ref 0–0.2)
OTHER OBSERVATIONS: ABNORMAL
PH UR STRIP: 6 (ref 5–9)
PLATELET # BLD AUTO: 144 K/UL (ref 150–450)
PMV BLD AUTO: 9.3 FL (ref 9.4–12.3)
POTASSIUM SERPL-SCNC: 4.3 MMOL/L (ref 3.5–5.1)
PROT UR STRIP-MCNC: 30 MG/DL
RBC # BLD AUTO: 4.39 M/UL (ref 4.23–5.6)
RBC #/AREA URNS HPF: ABNORMAL /HPF
SODIUM SERPL-SCNC: 140 MMOL/L (ref 136–145)
SP GR UR REFRACTOMETRY: 1.01 (ref 1–1.02)
URINE CULTURE IF INDICATED: ABNORMAL
UROBILINOGEN UR QL STRIP.AUTO: 0.2 EU/DL (ref 0.2–1)
WBC # BLD AUTO: 6.5 K/UL (ref 4.3–11.1)
WBC URNS QL MICRO: >100 /HPF

## 2024-05-09 PROCEDURE — 97161 PT EVAL LOW COMPLEX 20 MIN: CPT

## 2024-05-09 PROCEDURE — 81001 URINALYSIS AUTO W/SCOPE: CPT

## 2024-05-09 PROCEDURE — 87186 SC STD MICRODIL/AGAR DIL: CPT

## 2024-05-09 PROCEDURE — 36415 COLL VENOUS BLD VENIPUNCTURE: CPT

## 2024-05-09 PROCEDURE — 97116 GAIT TRAINING THERAPY: CPT

## 2024-05-09 PROCEDURE — 87088 URINE BACTERIA CULTURE: CPT

## 2024-05-09 PROCEDURE — 6360000002 HC RX W HCPCS: Performed by: INTERNAL MEDICINE

## 2024-05-09 PROCEDURE — G0378 HOSPITAL OBSERVATION PER HR: HCPCS

## 2024-05-09 PROCEDURE — 96374 THER/PROPH/DIAG INJ IV PUSH: CPT

## 2024-05-09 PROCEDURE — 97165 OT EVAL LOW COMPLEX 30 MIN: CPT

## 2024-05-09 PROCEDURE — 6370000000 HC RX 637 (ALT 250 FOR IP): Performed by: FAMILY MEDICINE

## 2024-05-09 PROCEDURE — 87086 URINE CULTURE/COLONY COUNT: CPT

## 2024-05-09 PROCEDURE — 2580000003 HC RX 258: Performed by: INTERNAL MEDICINE

## 2024-05-09 PROCEDURE — 80048 BASIC METABOLIC PNL TOTAL CA: CPT

## 2024-05-09 PROCEDURE — 97530 THERAPEUTIC ACTIVITIES: CPT

## 2024-05-09 PROCEDURE — 85025 COMPLETE CBC W/AUTO DIFF WBC: CPT

## 2024-05-09 PROCEDURE — 2580000003 HC RX 258: Performed by: FAMILY MEDICINE

## 2024-05-09 RX ORDER — SODIUM CHLORIDE 9 MG/ML
INJECTION, SOLUTION INTRAVENOUS CONTINUOUS
Status: DISCONTINUED | OUTPATIENT
Start: 2024-05-09 | End: 2024-05-12

## 2024-05-09 RX ADMIN — SODIUM CHLORIDE, PRESERVATIVE FREE 10 ML: 5 INJECTION INTRAVENOUS at 08:03

## 2024-05-09 RX ADMIN — ATORVASTATIN CALCIUM 40 MG: 40 TABLET, FILM COATED ORAL at 08:03

## 2024-05-09 RX ADMIN — BISACODYL 10 MG: 10 SUPPOSITORY RECTAL at 08:03

## 2024-05-09 RX ADMIN — ASPIRIN 81 MG: 81 TABLET, COATED ORAL at 08:03

## 2024-05-09 RX ADMIN — WATER 1000 MG: 1 INJECTION INTRAMUSCULAR; INTRAVENOUS; SUBCUTANEOUS at 05:58

## 2024-05-09 RX ADMIN — SODIUM CHLORIDE, PRESERVATIVE FREE 10 ML: 5 INJECTION INTRAVENOUS at 20:20

## 2024-05-09 RX ADMIN — SODIUM CHLORIDE: 9 INJECTION, SOLUTION INTRAVENOUS at 06:01

## 2024-05-09 RX ADMIN — LOSARTAN POTASSIUM 100 MG: 50 TABLET, FILM COATED ORAL at 08:03

## 2024-05-09 RX ADMIN — LACTULOSE 20 G: 10 SOLUTION ORAL at 08:03

## 2024-05-09 RX ADMIN — DOCUSATE SODIUM 50 MG AND SENNOSIDES 8.6 MG 2 TABLET: 8.6; 5 TABLET, FILM COATED ORAL at 08:03

## 2024-05-09 RX ADMIN — FLUOXETINE HYDROCHLORIDE 20 MG: 20 CAPSULE ORAL at 08:05

## 2024-05-09 ASSESSMENT — PAIN SCALES - GENERAL
PAINLEVEL_OUTOF10: 0

## 2024-05-10 LAB
ANION GAP SERPL CALC-SCNC: 10 MMOL/L (ref 9–18)
BASOPHILS # BLD: 0 K/UL (ref 0–0.2)
BASOPHILS NFR BLD: 1 % (ref 0–2)
BUN SERPL-MCNC: 19 MG/DL (ref 8–23)
CALCIUM SERPL-MCNC: 8.3 MG/DL (ref 8.8–10.2)
CHLORIDE SERPL-SCNC: 106 MMOL/L (ref 98–107)
CO2 SERPL-SCNC: 25 MMOL/L (ref 20–28)
CREAT SERPL-MCNC: 1.34 MG/DL (ref 0.8–1.3)
DIFFERENTIAL METHOD BLD: ABNORMAL
EOSINOPHIL # BLD: 0.3 K/UL (ref 0–0.8)
EOSINOPHIL NFR BLD: 5 % (ref 0.5–7.8)
ERYTHROCYTE [DISTWIDTH] IN BLOOD BY AUTOMATED COUNT: 13.9 % (ref 11.9–14.6)
GLUCOSE SERPL-MCNC: 100 MG/DL (ref 70–99)
HCT VFR BLD AUTO: 39.9 % (ref 41.1–50.3)
HGB BLD-MCNC: 12.9 G/DL (ref 13.6–17.2)
IMM GRANULOCYTES # BLD AUTO: 0 K/UL (ref 0–0.5)
IMM GRANULOCYTES NFR BLD AUTO: 0 % (ref 0–5)
LYMPHOCYTES # BLD: 1.9 K/UL (ref 0.5–4.6)
LYMPHOCYTES NFR BLD: 34 % (ref 13–44)
MCH RBC QN AUTO: 30 PG (ref 26.1–32.9)
MCHC RBC AUTO-ENTMCNC: 32.3 G/DL (ref 31.4–35)
MCV RBC AUTO: 92.8 FL (ref 82–102)
MONOCYTES # BLD: 0.5 K/UL (ref 0.1–1.3)
MONOCYTES NFR BLD: 9 % (ref 4–12)
NEUTS SEG # BLD: 2.8 K/UL (ref 1.7–8.2)
NEUTS SEG NFR BLD: 51 % (ref 43–78)
NRBC # BLD: 0 K/UL (ref 0–0.2)
PLATELET # BLD AUTO: 145 K/UL (ref 150–450)
PMV BLD AUTO: 9.2 FL (ref 9.4–12.3)
POTASSIUM SERPL-SCNC: 4 MMOL/L (ref 3.5–5.1)
RBC # BLD AUTO: 4.3 M/UL (ref 4.23–5.6)
SODIUM SERPL-SCNC: 141 MMOL/L (ref 136–145)
WBC # BLD AUTO: 5.7 K/UL (ref 4.3–11.1)

## 2024-05-10 PROCEDURE — 2580000003 HC RX 258: Performed by: INTERNAL MEDICINE

## 2024-05-10 PROCEDURE — 80048 BASIC METABOLIC PNL TOTAL CA: CPT

## 2024-05-10 PROCEDURE — 2580000003 HC RX 258: Performed by: FAMILY MEDICINE

## 2024-05-10 PROCEDURE — 36415 COLL VENOUS BLD VENIPUNCTURE: CPT

## 2024-05-10 PROCEDURE — 6370000000 HC RX 637 (ALT 250 FOR IP): Performed by: FAMILY MEDICINE

## 2024-05-10 PROCEDURE — G0378 HOSPITAL OBSERVATION PER HR: HCPCS

## 2024-05-10 PROCEDURE — 1100000000 HC RM PRIVATE

## 2024-05-10 PROCEDURE — 85025 COMPLETE CBC W/AUTO DIFF WBC: CPT

## 2024-05-10 PROCEDURE — 6360000002 HC RX W HCPCS: Performed by: INTERNAL MEDICINE

## 2024-05-10 PROCEDURE — 96376 TX/PRO/DX INJ SAME DRUG ADON: CPT

## 2024-05-10 RX ADMIN — LOSARTAN POTASSIUM 100 MG: 50 TABLET, FILM COATED ORAL at 07:48

## 2024-05-10 RX ADMIN — SODIUM CHLORIDE: 9 INJECTION, SOLUTION INTRAVENOUS at 01:47

## 2024-05-10 RX ADMIN — FLUOXETINE HYDROCHLORIDE 20 MG: 20 CAPSULE ORAL at 07:48

## 2024-05-10 RX ADMIN — SODIUM CHLORIDE: 9 INJECTION, SOLUTION INTRAVENOUS at 12:19

## 2024-05-10 RX ADMIN — SODIUM CHLORIDE, PRESERVATIVE FREE 10 ML: 5 INJECTION INTRAVENOUS at 07:50

## 2024-05-10 RX ADMIN — SODIUM CHLORIDE, PRESERVATIVE FREE 10 ML: 5 INJECTION INTRAVENOUS at 20:20

## 2024-05-10 RX ADMIN — DOCUSATE SODIUM 50 MG AND SENNOSIDES 8.6 MG 2 TABLET: 8.6; 5 TABLET, FILM COATED ORAL at 20:17

## 2024-05-10 RX ADMIN — ASPIRIN 81 MG: 81 TABLET, COATED ORAL at 07:48

## 2024-05-10 RX ADMIN — WATER 1000 MG: 1 INJECTION INTRAMUSCULAR; INTRAVENOUS; SUBCUTANEOUS at 05:36

## 2024-05-10 RX ADMIN — ATORVASTATIN CALCIUM 40 MG: 40 TABLET, FILM COATED ORAL at 07:48

## 2024-05-10 RX ADMIN — DOCUSATE SODIUM 50 MG AND SENNOSIDES 8.6 MG 2 TABLET: 8.6; 5 TABLET, FILM COATED ORAL at 07:48

## 2024-05-10 RX ADMIN — LACTULOSE 20 G: 10 SOLUTION ORAL at 20:17

## 2024-05-10 ASSESSMENT — PAIN SCALES - GENERAL
PAINLEVEL_OUTOF10: 0

## 2024-05-11 LAB
ANION GAP SERPL CALC-SCNC: 9 MMOL/L (ref 9–18)
BASOPHILS # BLD: 0 K/UL (ref 0–0.2)
BASOPHILS NFR BLD: 1 % (ref 0–2)
BUN SERPL-MCNC: 18 MG/DL (ref 8–23)
CALCIUM SERPL-MCNC: 8.2 MG/DL (ref 8.8–10.2)
CHLORIDE SERPL-SCNC: 109 MMOL/L (ref 98–107)
CO2 SERPL-SCNC: 22 MMOL/L (ref 20–28)
CREAT SERPL-MCNC: 1.29 MG/DL (ref 0.8–1.3)
DIFFERENTIAL METHOD BLD: ABNORMAL
EOSINOPHIL # BLD: 0.3 K/UL (ref 0–0.8)
EOSINOPHIL NFR BLD: 5 % (ref 0.5–7.8)
ERYTHROCYTE [DISTWIDTH] IN BLOOD BY AUTOMATED COUNT: 13.6 % (ref 11.9–14.6)
GLUCOSE SERPL-MCNC: 112 MG/DL (ref 70–99)
HCT VFR BLD AUTO: 38.4 % (ref 41.1–50.3)
HGB BLD-MCNC: 12.5 G/DL (ref 13.6–17.2)
IMM GRANULOCYTES # BLD AUTO: 0 K/UL (ref 0–0.5)
IMM GRANULOCYTES NFR BLD AUTO: 0 % (ref 0–5)
LYMPHOCYTES # BLD: 1.9 K/UL (ref 0.5–4.6)
LYMPHOCYTES NFR BLD: 34 % (ref 13–44)
MCH RBC QN AUTO: 30.4 PG (ref 26.1–32.9)
MCHC RBC AUTO-ENTMCNC: 32.6 G/DL (ref 31.4–35)
MCV RBC AUTO: 93.4 FL (ref 82–102)
MONOCYTES # BLD: 0.5 K/UL (ref 0.1–1.3)
MONOCYTES NFR BLD: 9 % (ref 4–12)
NEUTS SEG # BLD: 2.8 K/UL (ref 1.7–8.2)
NEUTS SEG NFR BLD: 51 % (ref 43–78)
NRBC # BLD: 0 K/UL (ref 0–0.2)
PLATELET # BLD AUTO: 133 K/UL (ref 150–450)
PMV BLD AUTO: 9.6 FL (ref 9.4–12.3)
POTASSIUM SERPL-SCNC: 4.5 MMOL/L (ref 3.5–5.1)
RBC # BLD AUTO: 4.11 M/UL (ref 4.23–5.6)
SODIUM SERPL-SCNC: 141 MMOL/L (ref 136–145)
WBC # BLD AUTO: 5.5 K/UL (ref 4.3–11.1)

## 2024-05-11 PROCEDURE — 2580000003 HC RX 258: Performed by: INTERNAL MEDICINE

## 2024-05-11 PROCEDURE — 1100000000 HC RM PRIVATE

## 2024-05-11 PROCEDURE — 85025 COMPLETE CBC W/AUTO DIFF WBC: CPT

## 2024-05-11 PROCEDURE — 6370000000 HC RX 637 (ALT 250 FOR IP): Performed by: FAMILY MEDICINE

## 2024-05-11 PROCEDURE — 80048 BASIC METABOLIC PNL TOTAL CA: CPT

## 2024-05-11 PROCEDURE — 2580000003 HC RX 258: Performed by: FAMILY MEDICINE

## 2024-05-11 PROCEDURE — 6360000002 HC RX W HCPCS: Performed by: INTERNAL MEDICINE

## 2024-05-11 PROCEDURE — 36415 COLL VENOUS BLD VENIPUNCTURE: CPT

## 2024-05-11 RX ADMIN — DOCUSATE SODIUM 50 MG AND SENNOSIDES 8.6 MG 2 TABLET: 8.6; 5 TABLET, FILM COATED ORAL at 08:09

## 2024-05-11 RX ADMIN — SODIUM CHLORIDE, PRESERVATIVE FREE 10 ML: 5 INJECTION INTRAVENOUS at 08:10

## 2024-05-11 RX ADMIN — DOCUSATE SODIUM 50 MG AND SENNOSIDES 8.6 MG 2 TABLET: 8.6; 5 TABLET, FILM COATED ORAL at 21:10

## 2024-05-11 RX ADMIN — ASPIRIN 81 MG: 81 TABLET, COATED ORAL at 08:09

## 2024-05-11 RX ADMIN — LACTULOSE 20 G: 10 SOLUTION ORAL at 21:10

## 2024-05-11 RX ADMIN — LOSARTAN POTASSIUM 100 MG: 50 TABLET, FILM COATED ORAL at 08:09

## 2024-05-11 RX ADMIN — ATORVASTATIN CALCIUM 40 MG: 40 TABLET, FILM COATED ORAL at 08:09

## 2024-05-11 RX ADMIN — SODIUM CHLORIDE, PRESERVATIVE FREE 10 ML: 5 INJECTION INTRAVENOUS at 21:10

## 2024-05-11 RX ADMIN — SODIUM CHLORIDE: 9 INJECTION, SOLUTION INTRAVENOUS at 16:08

## 2024-05-11 RX ADMIN — WATER 1000 MG: 1 INJECTION INTRAMUSCULAR; INTRAVENOUS; SUBCUTANEOUS at 05:27

## 2024-05-11 RX ADMIN — SODIUM CHLORIDE: 9 INJECTION, SOLUTION INTRAVENOUS at 05:27

## 2024-05-11 RX ADMIN — FLUOXETINE HYDROCHLORIDE 20 MG: 20 CAPSULE ORAL at 08:31

## 2024-05-11 ASSESSMENT — PAIN SCALES - GENERAL
PAINLEVEL_OUTOF10: 0

## 2024-05-12 LAB
ANION GAP SERPL CALC-SCNC: 9 MMOL/L (ref 9–18)
BASOPHILS # BLD: 0 K/UL (ref 0–0.2)
BASOPHILS NFR BLD: 1 % (ref 0–2)
BUN SERPL-MCNC: 16 MG/DL (ref 8–23)
CALCIUM SERPL-MCNC: 8 MG/DL (ref 8.8–10.2)
CHLORIDE SERPL-SCNC: 113 MMOL/L (ref 98–107)
CO2 SERPL-SCNC: 19 MMOL/L (ref 20–28)
CREAT SERPL-MCNC: 1.31 MG/DL (ref 0.8–1.3)
DIFFERENTIAL METHOD BLD: ABNORMAL
EOSINOPHIL # BLD: 0.2 K/UL (ref 0–0.8)
EOSINOPHIL NFR BLD: 5 % (ref 0.5–7.8)
ERYTHROCYTE [DISTWIDTH] IN BLOOD BY AUTOMATED COUNT: 13.6 % (ref 11.9–14.6)
GLUCOSE SERPL-MCNC: 112 MG/DL (ref 70–99)
HCT VFR BLD AUTO: 35.8 % (ref 41.1–50.3)
HGB BLD-MCNC: 11.6 G/DL (ref 13.6–17.2)
IMM GRANULOCYTES # BLD AUTO: 0 K/UL (ref 0–0.5)
IMM GRANULOCYTES NFR BLD AUTO: 0 % (ref 0–5)
LYMPHOCYTES # BLD: 1.9 K/UL (ref 0.5–4.6)
LYMPHOCYTES NFR BLD: 37 % (ref 13–44)
MCH RBC QN AUTO: 30.4 PG (ref 26.1–32.9)
MCHC RBC AUTO-ENTMCNC: 32.4 G/DL (ref 31.4–35)
MCV RBC AUTO: 93.7 FL (ref 82–102)
MONOCYTES # BLD: 0.5 K/UL (ref 0.1–1.3)
MONOCYTES NFR BLD: 10 % (ref 4–12)
NEUTS SEG # BLD: 2.5 K/UL (ref 1.7–8.2)
NEUTS SEG NFR BLD: 47 % (ref 43–78)
NRBC # BLD: 0 K/UL (ref 0–0.2)
PLATELET # BLD AUTO: 119 K/UL (ref 150–450)
PMV BLD AUTO: 9.5 FL (ref 9.4–12.3)
POTASSIUM SERPL-SCNC: 3.7 MMOL/L (ref 3.5–5.1)
RBC # BLD AUTO: 3.82 M/UL (ref 4.23–5.6)
SODIUM SERPL-SCNC: 142 MMOL/L (ref 136–145)
WBC # BLD AUTO: 5.1 K/UL (ref 4.3–11.1)

## 2024-05-12 PROCEDURE — 6360000002 HC RX W HCPCS: Performed by: INTERNAL MEDICINE

## 2024-05-12 PROCEDURE — 85025 COMPLETE CBC W/AUTO DIFF WBC: CPT

## 2024-05-12 PROCEDURE — 2580000003 HC RX 258: Performed by: INTERNAL MEDICINE

## 2024-05-12 PROCEDURE — 80048 BASIC METABOLIC PNL TOTAL CA: CPT

## 2024-05-12 PROCEDURE — 36415 COLL VENOUS BLD VENIPUNCTURE: CPT

## 2024-05-12 PROCEDURE — 1100000000 HC RM PRIVATE

## 2024-05-12 PROCEDURE — 6370000000 HC RX 637 (ALT 250 FOR IP): Performed by: FAMILY MEDICINE

## 2024-05-12 PROCEDURE — 2580000003 HC RX 258: Performed by: FAMILY MEDICINE

## 2024-05-12 RX ADMIN — DOCUSATE SODIUM 50 MG AND SENNOSIDES 8.6 MG 2 TABLET: 8.6; 5 TABLET, FILM COATED ORAL at 21:54

## 2024-05-12 RX ADMIN — LOSARTAN POTASSIUM 100 MG: 50 TABLET, FILM COATED ORAL at 07:50

## 2024-05-12 RX ADMIN — DOCUSATE SODIUM 50 MG AND SENNOSIDES 8.6 MG 2 TABLET: 8.6; 5 TABLET, FILM COATED ORAL at 07:50

## 2024-05-12 RX ADMIN — SODIUM CHLORIDE, PRESERVATIVE FREE 10 ML: 5 INJECTION INTRAVENOUS at 21:54

## 2024-05-12 RX ADMIN — ASPIRIN 81 MG: 81 TABLET, COATED ORAL at 07:50

## 2024-05-12 RX ADMIN — FLUOXETINE HYDROCHLORIDE 20 MG: 20 CAPSULE ORAL at 07:50

## 2024-05-12 RX ADMIN — WATER 1000 MG: 1 INJECTION INTRAMUSCULAR; INTRAVENOUS; SUBCUTANEOUS at 05:53

## 2024-05-12 RX ADMIN — ATORVASTATIN CALCIUM 40 MG: 40 TABLET, FILM COATED ORAL at 07:50

## 2024-05-12 RX ADMIN — SODIUM CHLORIDE, PRESERVATIVE FREE 10 ML: 5 INJECTION INTRAVENOUS at 07:50

## 2024-05-12 RX ADMIN — LACTULOSE 20 G: 10 SOLUTION ORAL at 21:54

## 2024-05-12 RX ADMIN — SODIUM CHLORIDE: 9 INJECTION, SOLUTION INTRAVENOUS at 02:30

## 2024-05-12 ASSESSMENT — PAIN SCALES - GENERAL
PAINLEVEL_OUTOF10: 0
PAINLEVEL_OUTOF10: 0

## 2024-05-13 VITALS
RESPIRATION RATE: 20 BRPM | BODY MASS INDEX: 23.81 KG/M2 | SYSTOLIC BLOOD PRESSURE: 135 MMHG | OXYGEN SATURATION: 100 % | HEIGHT: 70 IN | WEIGHT: 166.3 LBS | TEMPERATURE: 98.1 F | DIASTOLIC BLOOD PRESSURE: 67 MMHG | HEART RATE: 81 BPM

## 2024-05-13 LAB
ANION GAP SERPL CALC-SCNC: 8 MMOL/L (ref 9–18)
BACTERIA SPEC CULT: ABNORMAL
BUN SERPL-MCNC: 14 MG/DL (ref 8–23)
CALCIUM SERPL-MCNC: 8.4 MG/DL (ref 8.8–10.2)
CHLORIDE SERPL-SCNC: 110 MMOL/L (ref 98–107)
CO2 SERPL-SCNC: 21 MMOL/L (ref 20–28)
CREAT SERPL-MCNC: 1.16 MG/DL (ref 0.8–1.3)
GLUCOSE SERPL-MCNC: 86 MG/DL (ref 70–99)
POTASSIUM SERPL-SCNC: 3.8 MMOL/L (ref 3.5–5.1)
SERVICE CMNT-IMP: ABNORMAL
SODIUM SERPL-SCNC: 139 MMOL/L (ref 136–145)

## 2024-05-13 PROCEDURE — 97116 GAIT TRAINING THERAPY: CPT

## 2024-05-13 PROCEDURE — 2580000003 HC RX 258: Performed by: INTERNAL MEDICINE

## 2024-05-13 PROCEDURE — 80048 BASIC METABOLIC PNL TOTAL CA: CPT

## 2024-05-13 PROCEDURE — 97535 SELF CARE MNGMENT TRAINING: CPT

## 2024-05-13 PROCEDURE — 6370000000 HC RX 637 (ALT 250 FOR IP): Performed by: FAMILY MEDICINE

## 2024-05-13 PROCEDURE — 36415 COLL VENOUS BLD VENIPUNCTURE: CPT

## 2024-05-13 PROCEDURE — 2580000003 HC RX 258: Performed by: FAMILY MEDICINE

## 2024-05-13 PROCEDURE — 97530 THERAPEUTIC ACTIVITIES: CPT

## 2024-05-13 PROCEDURE — 6360000002 HC RX W HCPCS: Performed by: INTERNAL MEDICINE

## 2024-05-13 RX ORDER — POLYETHYLENE GLYCOL 3350 17 G/17G
17 POWDER, FOR SOLUTION ORAL DAILY PRN
Qty: 510 G | Refills: 0 | Status: SHIPPED | OUTPATIENT
Start: 2024-05-13 | End: 2024-06-12

## 2024-05-13 RX ORDER — SENNA AND DOCUSATE SODIUM 50; 8.6 MG/1; MG/1
2 TABLET, FILM COATED ORAL DAILY
Qty: 30 TABLET | Refills: 0 | Status: SHIPPED | OUTPATIENT
Start: 2024-05-13

## 2024-05-13 RX ADMIN — FLUOXETINE HYDROCHLORIDE 20 MG: 20 CAPSULE ORAL at 09:48

## 2024-05-13 RX ADMIN — DOCUSATE SODIUM 50 MG AND SENNOSIDES 8.6 MG 2 TABLET: 8.6; 5 TABLET, FILM COATED ORAL at 09:45

## 2024-05-13 RX ADMIN — SODIUM CHLORIDE, PRESERVATIVE FREE 10 ML: 5 INJECTION INTRAVENOUS at 09:45

## 2024-05-13 RX ADMIN — LACTULOSE 20 G: 10 SOLUTION ORAL at 09:45

## 2024-05-13 RX ADMIN — LOSARTAN POTASSIUM 100 MG: 50 TABLET, FILM COATED ORAL at 09:44

## 2024-05-13 RX ADMIN — ASPIRIN 81 MG: 81 TABLET, COATED ORAL at 09:44

## 2024-05-13 RX ADMIN — ATORVASTATIN CALCIUM 40 MG: 40 TABLET, FILM COATED ORAL at 09:44

## 2024-05-13 RX ADMIN — WATER 1000 MG: 1 INJECTION INTRAMUSCULAR; INTRAVENOUS; SUBCUTANEOUS at 06:07

## 2024-05-13 NOTE — CARE COORDINATION
Pt discussed during IDR and chart screened by CM for d/c planning.  PT/OT recommend HH at d/c with continuing use of rollator.  Awaiting Ucx which is + Klebsiella and E-coli.  Sensitivities have also resulted.   Anticipate d/c home tomorrow if medically stable.  CM will continue to follow.  LOS = 3 days

## 2024-05-13 NOTE — DISCHARGE SUMMARY
hyperemia involving the left renal collecting system and left ureter suggestive of pyelitis/ureteritis. 2.  There is fecal impaction and stercoral colitis involving the rectum. 3.  There is mild cholelithiasis. 4.  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 5.  There is a fat-containing left inguinal hernia. Automatic exposure control was used as a dose lowering technique. Radiation Dose: CTDI is 9.65 mGy. DLP is 605.19 mGy-cm. Contrast Type: iopamidol (ISOVUE-370) 76 . Contrast Volume: 100 mL Report signed on 05/08/2024 (03:11 Eastern Time) Signed by: Olivia Sanchez M.D. Reading Location: [facility]       Labs: Results:       BMP, Mg, Phos Recent Labs     05/11/24  0525 05/12/24  1128 05/13/24  0850    142 139   K 4.5 3.7 3.8   * 113* 110*   CO2 22 19* 21   ANIONGAP 9 9 8*   BUN 18 16 14   CREATININE 1.29 1.31* 1.16   LABGLOM 56* 55* 63   CALCIUM 8.2* 8.0* 8.4*   GLUCOSE 112* 112* 86      CBC Recent Labs     05/11/24  0525 05/12/24  1128   WBC 5.5 5.1   RBC 4.11* 3.82*   HGB 12.5* 11.6*   HCT 38.4* 35.8*   MCV 93.4 93.7   MCH 30.4 30.4   MCHC 32.6 32.4   RDW 13.6 13.6   * 119*   MPV 9.6 9.5   NRBC 0.00 0.00   LYMPHOPCT 34 37   MONOPCT 9 10   BASOPCT 1 1   IMMGRAN 0 0   EOSABS 0.3 0.2   MONOSABS 0.5 0.5   BASOSABS 0.0 0.0   ABSIMMGRAN 0.0 0.0      LFT No results for input(s): \"BILITOT\", \"BILIDIR\", \"ALKPHOS\", \"AST\", \"ALT\", \"PROT\", \"LABALBU\", \"GLOB\" in the last 72 hours.   Cardiac  No results found for: \"NTPROBNP\", \"TROPHS\"   Coags Lab Results   Component Value Date/Time    PROTIME 14.7 06/19/2021 04:08 PM    INR 1.1 06/19/2021 04:08 PM    APTT 34.6 06/19/2021 04:08 PM      A1c Lab Results   Component Value Date/Time    LABA1C 6.0 08/04/2023 09:31 AM    LABA1C 5.9 02/23/2023 09:47 AM    LABA1C 6.2 08/17/2022 11:21 AM     08/04/2023 09:31 AM     02/23/2023 09:47 AM     08/17/2022 11:21 AM      Lipids Lab Results   Component Value

## 2024-05-13 NOTE — PLAN OF CARE
Problem: Pain  Goal: Verbalizes/displays adequate comfort level or baseline comfort level  5/10/2024 1010 by Mine Boogie RN  Outcome: Progressing  Flowsheets (Taken 5/10/2024 0257 by Tatianna Dickey RN)  Verbalizes/displays adequate comfort level or baseline comfort level:   Encourage patient to monitor pain and request assistance   Assess pain using appropriate pain scale   Administer analgesics based on type and severity of pain and evaluate response   Implement non-pharmacological measures as appropriate and evaluate response   Consider cultural and social influences on pain and pain management  5/10/2024 0026 by Tatianna Dickey RN  Outcome: Progressing  Flowsheets (Taken 5/9/2024 2020)  Verbalizes/displays adequate comfort level or baseline comfort level:   Encourage patient to monitor pain and request assistance   Assess pain using appropriate pain scale   Administer analgesics based on type and severity of pain and evaluate response   Implement non-pharmacological measures as appropriate and evaluate response   Consider cultural and social influences on pain and pain management     Problem: Pain  Goal: Verbalizes/displays adequate comfort level or baseline comfort level  5/10/2024 1010 by Mine Boogie RN  Outcome: Progressing  Flowsheets (Taken 5/10/2024 0257 by Tatianna Dickey RN)  Verbalizes/displays adequate comfort level or baseline comfort level:   Encourage patient to monitor pain and request assistance   Assess pain using appropriate pain scale   Administer analgesics based on type and severity of pain and evaluate response   Implement non-pharmacological measures as appropriate and evaluate response   Consider cultural and social influences on pain and pain management  5/10/2024 0026 by Tatianna Dickey RN  Outcome: Progressing  Flowsheets (Taken 5/9/2024 2020)  Verbalizes/displays adequate comfort level or baseline comfort level:   Encourage patient to monitor pain 
  Problem: Pain  Goal: Verbalizes/displays adequate comfort level or baseline comfort level  5/12/2024 0854 by Karla Waterman RN  Outcome: Progressing  5/12/2024 0853 by Karla Waterman RN  Outcome: Progressing  5/12/2024 0258 by Malia Louise RN  Outcome: Progressing     Problem: Skin/Tissue Integrity  Goal: Absence of new skin breakdown  Description: 1.  Monitor for areas of redness and/or skin breakdown  2.  Assess vascular access sites hourly  3.  Every 4-6 hours minimum:  Change oxygen saturation probe site  4.  Every 4-6 hours:  If on nasal continuous positive airway pressure, respiratory therapy assess nares and determine need for appliance change or resting period.  5/12/2024 0854 by Karla Waterman RN  Outcome: Progressing  5/12/2024 0853 by Karla Waterman RN  Outcome: Progressing  5/12/2024 0258 by Malia Louise RN  Outcome: Progressing     Problem: Safety - Adult  Goal: Free from fall injury  5/12/2024 0854 by Karla Waterman RN  Outcome: Progressing  5/12/2024 0853 by Karla Waterman RN  Outcome: Progressing  5/12/2024 0258 by Malia Louise RN  Outcome: Progressing  Flowsheets  Taken 5/12/2024 0248  Free From Fall Injury: Instruct family/caregiver on patient safety  Taken 5/11/2024 2113  Free From Fall Injury: Instruct family/caregiver on patient safety     Problem: ABCDS Injury Assessment  Goal: Absence of physical injury  5/12/2024 0854 by Karla Waterman RN  Outcome: Progressing  5/12/2024 0258 by Malia Louise RN  Outcome: Progressing  Flowsheets (Taken 5/11/2024 2113)  Absence of Physical Injury: Implement safety measures based on patient assessment     Problem: Neurosensory - Adult  Goal: Achieves stable or improved neurological status  5/12/2024 0854 by Karla Waterman RN  Outcome: Progressing  5/12/2024 0258 by Malia Louise RN  Outcome: Progressing  Flowsheets (Taken 5/11/2024 2113)  Achieves stable or improved neurological 
  Problem: Pain  Goal: Verbalizes/displays adequate comfort level or baseline comfort level  5/9/2024 0720 by Katherin Petit RN  Outcome: Progressing  Flowsheets (Taken 5/9/2024 0255 by Tatianna Dickey RN)  Verbalizes/displays adequate comfort level or baseline comfort level:   Encourage patient to monitor pain and request assistance   Assess pain using appropriate pain scale   Administer analgesics based on type and severity of pain and evaluate response   Implement non-pharmacological measures as appropriate and evaluate response   Consider cultural and social influences on pain and pain management  5/8/2024 2225 by Tatianna Dickey RN  Outcome: Progressing  Flowsheets (Taken 5/8/2024 2013)  Verbalizes/displays adequate comfort level or baseline comfort level:   Encourage patient to monitor pain and request assistance   Assess pain using appropriate pain scale   Administer analgesics based on type and severity of pain and evaluate response   Implement non-pharmacological measures as appropriate and evaluate response   Consider cultural and social influences on pain and pain management     Problem: Skin/Tissue Integrity  Goal: Absence of new skin breakdown  Description: 1.  Monitor for areas of redness and/or skin breakdown  2.  Assess vascular access sites hourly  3.  Every 4-6 hours minimum:  Change oxygen saturation probe site  4.  Every 4-6 hours:  If on nasal continuous positive airway pressure, respiratory therapy assess nares and determine need for appliance change or resting period.  5/9/2024 0720 by Katherin Petit RN  Outcome: Progressing  5/8/2024 2225 by Tatianna Dickey RN  Outcome: Progressing     
  Problem: Pain  Goal: Verbalizes/displays adequate comfort level or baseline comfort level  Outcome: Progressing     Problem: Skin/Tissue Integrity  Goal: Absence of new skin breakdown  Description: 1.  Monitor for areas of redness and/or skin breakdown  2.  Assess vascular access sites hourly  3.  Every 4-6 hours minimum:  Change oxygen saturation probe site  4.  Every 4-6 hours:  If on nasal continuous positive airway pressure, respiratory therapy assess nares and determine need for appliance change or resting period.  Outcome: Progressing     Problem: Safety - Adult  Goal: Free from fall injury  Outcome: Progressing  Flowsheets (Taken 5/10/2024 2223)  Free From Fall Injury: Instruct family/caregiver on patient safety     Problem: ABCDS Injury Assessment  Goal: Absence of physical injury  Outcome: Progressing  Flowsheets (Taken 5/10/2024 2223)  Absence of Physical Injury: Implement safety measures based on patient assessment     Problem: Neurosensory - Adult  Goal: Achieves stable or improved neurological status  Outcome: Progressing  Flowsheets (Taken 5/10/2024 2213)  Achieves stable or improved neurological status: Assess for and report changes in neurological status  Goal: Achieves maximal functionality and self care  Outcome: Progressing  Flowsheets (Taken 5/10/2024 2213)  Achieves maximal functionality and self care: Monitor swallowing and airway patency with patient fatigue and changes in neurological status     Problem: Respiratory - Adult  Goal: Achieves optimal ventilation and oxygenation  Outcome: Progressing  Flowsheets (Taken 5/10/2024 2213)  Achieves optimal ventilation and oxygenation: Assess for changes in respiratory status     Problem: Cardiovascular - Adult  Goal: Maintains optimal cardiac output and hemodynamic stability  Outcome: Progressing  Flowsheets (Taken 5/10/2024 2213)  Maintains optimal cardiac output and hemodynamic stability: Monitor blood pressure and heart rate  Goal: Absence of 
  Problem: Pain  Goal: Verbalizes/displays adequate comfort level or baseline comfort level  Outcome: Progressing     Problem: Skin/Tissue Integrity  Goal: Absence of new skin breakdown  Description: 1.  Monitor for areas of redness and/or skin breakdown  2.  Assess vascular access sites hourly  3.  Every 4-6 hours minimum:  Change oxygen saturation probe site  4.  Every 4-6 hours:  If on nasal continuous positive airway pressure, respiratory therapy assess nares and determine need for appliance change or resting period.  Outcome: Progressing     Problem: Safety - Adult  Goal: Free from fall injury  Outcome: Progressing  Flowsheets (Taken 5/11/2024 2113)  Free From Fall Injury: Instruct family/caregiver on patient safety     Problem: ABCDS Injury Assessment  Goal: Absence of physical injury  Outcome: Progressing  Flowsheets (Taken 5/11/2024 2113)  Absence of Physical Injury: Implement safety measures based on patient assessment     Problem: Neurosensory - Adult  Goal: Achieves stable or improved neurological status  Outcome: Progressing  Flowsheets (Taken 5/11/2024 2113)  Achieves stable or improved neurological status: Assess for and report changes in neurological status  Goal: Achieves maximal functionality and self care  Outcome: Progressing  Flowsheets (Taken 5/11/2024 2113)  Achieves maximal functionality and self care: Monitor swallowing and airway patency with patient fatigue and changes in neurological status     Problem: Respiratory - Adult  Goal: Achieves optimal ventilation and oxygenation  Outcome: Progressing  Flowsheets (Taken 5/11/2024 2113)  Achieves optimal ventilation and oxygenation: Assess for changes in respiratory status     Problem: Cardiovascular - Adult  Goal: Maintains optimal cardiac output and hemodynamic stability  Outcome: Progressing  Flowsheets (Taken 5/11/2024 2113)  Maintains optimal cardiac output and hemodynamic stability: Monitor blood pressure and heart rate  Goal: Absence of 
excess or deficit   Monitor intake, output and patient weight   Monitor urine specific gravity, serum osmolarity and serum sodium as indicated or ordered  5/13/2024 0046 by Mine Petit RN  Outcome: Progressing  Goal: Glucose maintained within prescribed range  5/13/2024 1328 by Jasmyn Adame RN  Outcome: Adequate for Discharge  5/13/2024 0046 by Mine Petit RN  Outcome: Progressing     Problem: Hematologic - Adult  Goal: Maintains hematologic stability  5/13/2024 1328 by Jasmyn Adame RN  Outcome: Adequate for Discharge  5/13/2024 0046 by Mine Petit RN  Outcome: Progressing     Problem: Confusion  Goal: Confusion, delirium, dementia, or psychosis is improved or at baseline  Description: INTERVENTIONS:  1. Assess for possible contributors to thought disturbance, including medications, impaired vision or hearing, underlying metabolic abnormalities, dehydration, psychiatric diagnoses, and notify attending LIP  2. [place] high risk fall precautions, as indicated  3. Provide frequent short contacts to provide reality reorientation, refocusing and direction  4. Decrease environmental stimuli, including noise as appropriate  5. Monitor and intervene to maintain adequate nutrition, hydration, elimination, sleep and activity  6. If unable to ensure safety without constant attention obtain sitter and review sitter guidelines with assigned personnel  7. Initiate Psychosocial CNS and Spiritual Care consult, as indicated  5/13/2024 1328 by Jasmyn Adame RN  Outcome: Adequate for Discharge  Flowsheets (Taken 5/13/2024 0820)  Effect of thought disturbance (confusion, delirium, dementia, or psychosis) are managed with adequate functional status:   Assess for contributors to thought disturbance, including medications, impaired vision or hearing, underlying metabolic abnormalities, dehydration, psychiatric diagnoses, notify LIP   [place] high risk fall precautions, as indicated   Provide frequent short 
power  Description: INTERVENTIONS:  1. Assist patient/family to overcome blocks to healing by use of spiritual practices (prayer, meditation, guided imagery, reiki, breath work, etc).  2. De-myth guilt and help patient/family identify possible irrational spiritual/cultural beliefs and values.  3. Explore possibilities of making amends & reconciliation with self, others, and/or a greater power.  4. Guide patient/family in identifying painful feelings of guilt.  5. Help patient/famiy explore and identify spiritual beliefs, cultural understandings or values that may help or hinder letting go of issue.  6. Help patient/family explore feelings of anger, bitterness, resentment.  7. Help patient/family identify and examine the situation in which these feelings are experienced.  8. Help patient/family identify destructive displacement of feelings onto other individuals.  9. Invite use of sacraments/rituals/ceremonies as appropriate (e.g. - confession, anointing, smudging).  10. Refer patient/family to formal counseling and/or to [facility]ay[facility] community for further support work.  Outcome: Progressing     Problem: Involuntary Admit  Goal: Will cooperate with staff recommendations and doctor's orders and will demonstrate appropriate behavior  Description: INTERVENTIONS:  1. Treat underlying conditions and offer medication as ordered  2. Educate regarding involuntary admission procedures and rules  3. Contain excessive/inappropriate behavior per unit and hospital policies  Outcome: Progressing     
condition  2. Facilitate patient and family articulation of goals for care  3. Help patient and family identify pros/cons of alternative solutions  4. Provide information as requested by patient/family  5. Respect patient/family right to receive or not to receive information  6. Serve as a liaison between patient and family and health care team  7. Initiate Consults from Ethics, Palliative Care or initiate Family Care Conference as is appropriate  5/11/2024 0040 by Malia Louise RN  Outcome: Progressing  Flowsheets (Taken 5/10/2024 2213)  Patient/family able to effectively weigh alternatives and participate in decision making related to treatment and care: Determine when there are differences between patient's view, family's view, and healthcare provider's view of condition     Problem: Confusion  Goal: Confusion, delirium, dementia, or psychosis is improved or at baseline  Description: INTERVENTIONS:  1. Assess for possible contributors to thought disturbance, including medications, impaired vision or hearing, underlying metabolic abnormalities, dehydration, psychiatric diagnoses, and notify attending LIP  2. [place] high risk fall precautions, as indicated  3. Provide frequent short contacts to provide reality reorientation, refocusing and direction  4. Decrease environmental stimuli, including noise as appropriate  5. Monitor and intervene to maintain adequate nutrition, hydration, elimination, sleep and activity  6. If unable to ensure safety without constant attention obtain sitter and review sitter guidelines with assigned personnel  7. Initiate Psychosocial CNS and Spiritual Care consult, as indicated  5/11/2024 0040 by Malia Louise RN  Outcome: Progressing  Flowsheets (Taken 5/10/2024 2213)  Effect of thought disturbance (confusion, delirium, dementia, or psychosis) are managed with adequate functional status: Assess for contributors to thought disturbance, including medications, impaired

## 2024-05-13 NOTE — CARE COORDINATION
Pt to d/c home today.  Family will provide transportation.  PT/OT recommends HH at d/c.  [facility]: SN, PT, OT ordered per pt's choice.  No other supportive care needs identified.  Pt agrees with d/c plan.  Milestones me.t  LOS = 5 days       05/08/24 0902   Service Assessment   Patient Orientation Alert and Oriented;Person;Place;Self   Cognition Alert   History Provided By Patient;Medical Record   Primary Caregiver Self   Accompanied By/Relationship son Jamie   Support Systems Family Members;Friends/Neighbors;Children   Patient's Healthcare Decision Maker is: Legal Next of Kin   PCP Verified by CM Yes  (Dr Bismark Flores last visit August 2023)   Last Visit to PCP Within last year   Prior Functional Level Independent in ADLs/IADLs   Current Functional Level Independent in ADLs/IADLs   Can patient return to prior living arrangement Yes   Ability to make needs known: Good   Family able to assist with home care needs: Yes   Would you like for me to discuss the discharge plan with any other family members/significant others, and if so, who? No   Financial Resources Medicare   Community Resources None   CM/SW Referral Other (see comment)  (N/A)   Social/Functional History   Lives With Alone   Type of Home House   Home Layout One level   Home Access Level entry   Bathroom Shower/Tub Tub/Shower unit   Bathroom Toilet Standard   Bathroom Equipment None   Bathroom Accessibility Accessible   Home Equipment Rollator   Receives Help From Friend(s);Family   ADL Assistance Independent   Homemaking Assistance Independent   Ambulation Assistance Independent   Transfer Assistance Independent   Active  Yes   Mode of Transportation Car   Occupation Retired   Discharge Planning   Type of Residence House   Living Arrangements Alone   Current Services Prior To Admission Durable Medical Equipment   Current DME Prior to Arrival Other (Comment)  (Rollator)   Potential Assistance Needed N/A   DME Ordered? No   Potential Assistance

## 2024-05-13 NOTE — PROGRESS NOTES
Hospitalist Progress Note   Admit Date:  2024 12:33 AM   Name:  Kevin Clark   Age:  83 y.o.  Sex:  male  :  1940   MRN:  [id number]   Room:  [facility]/    Presenting/Chief Complaint: Constipation     Reason(s) for Admission: Fecal impaction in rectum (HCC) [K56.41]  Fecal impaction (HCC) [K56.41]     History of Present Illness:   As per prior documentation:  \"Kevin Clark is a 83 y.o. male with medical history of aflutter, CAD s/p CABG, CKD3 who presented with constipation for a week or more. Patient has an ileostomy and has been attempting to relief himself with no result. He is overall a poor historian and by himself at bedside so history was limited. Stated \"there is a hard ball down there that's stuck and I'm not able to push out.\"  Reported some lower quadrant abdominal discomfort.  He was alert and oriented to person, , place but not current time or situation. He denies fever, chills, SOB, chest pain, nausea vomiting    In ED, VSS.  CBC/CMP at baseline.  CTAP shows surgical changes of cystectomy with urinary diversion are noted, interval left double-J stent noted in appropriate position; mild left hydronephrosis; stranding and hyperemia involving the left renal collecting system and left ureter suggestive of pyelitis/ureteritis; fecal impaction and stercoral colitis involving the rectum; mild cholelithiasis; right abdominal wall ostomy with parastomal hernia similar to prior; fat-containing left inguinal hernia.  Manual disimpaction was attempted but unsuccessful. Milk of molasses enema given without improvement\"      Subjective:  Patient seen and evaluated.  Admitted secondary to fecal impaction  Currently resolved  Urine Cultures are pending  Denies chest pain nausea vomiting fevers or chills    Assessment & Plan:   The Assessment and Plan remains unchanged unless specifically outlined below:    Fecal impaction  Stercoral colitis   CTAP shows fecal impaction and stercoral colitis 
    ACUTE PHYSICAL THERAPY GOALS:   (Developed with and agreed upon by patient and/or caregiver.)  1. Patient will perform bed mobility with INDEPENDENCE within 7 days.  2. Patient will transfer bed to chair with MODIFIED INDEPENDENCE within 7 days.  3. Patient will demonstrate FAIR+ DYNAMIC STANDING balance within 7 day(s).  4. Patient will ambulate 150ft+ with MODIFIED INDEPENDENCE within 7 days.  5. Patient will tolerate 25+ minutes of therapeutic activity/exercise and/or neuromuscular re-education while maintaining stable vitals to improve functional strength and activity tolerance within 7 days.       PHYSICAL THERAPY: Daily Note AM   (Link to Caseload Tracking: PT Visit Days : 1  Time In/Out PT Charge Capture  Rehab Caseload Tracker  Orders    Kevin Clark is a 83 y.o. male   PRIMARY DIAGNOSIS: Fecal impaction (HCC)  Fecal impaction in rectum (HCC) [K56.41]  Fecal impaction (HCC) [K56.41]       Inpatient: Payor: MEDICARE / Plan: MEDICARE PART A AND B / Product Type: *No Product type* /     ASSESSMENT:     REHAB RECOMMENDATIONS:   Recommendation to date pending progress:  Setting:  Home Health Therapy    Equipment:     Continued use of patient's rollator     ASSESSMENT:  Mr. Clark is a pleasant 83 year old male admitted with fecal impaction and seen this AM for PT treatment session. At baseline, patient is a modified independent household level and short distance community level ambulator with use of rollator. Today, addressed pre-gait transfers, activity pacing, dynamic balance activity and ambulation x150ft with rolling walker and CGA-SBA. Cues for safety throughout. Good progress today with improved activity tolerance appreciated.  Re-educated patient on fall prevention strategies at home; verbalized understanding. Goals remain ongoing and progressing. Continue stated plan of care. Recommend HHPT and use of patient's rollator at discharge.      SUBJECTIVE:   Mr. Clark states, \"I've been watching 
ACUTE OCCUPATIONAL THERAPY GOALS:   (Developed with and agreed upon by patient and/or caregiver.)  1. Patient will complete lower body bathing and dressing with MOD I and adaptive equipment as needed.   2.Patient will complete upper body bathing and dressing with MOD I and adaptive equipment as needed.  3. Patient will complete toileting with MOD I.   4. Patient will tolerate 30 minutes of OT treatment with 1-2 rest breaks to increase activity tolerance for ADLs.   5. Patient will complete functional transfers with MOD I and adaptive equipment as needed.   6. Patient will complete functional activity with MOD I and adaptive equipment as needed.  7. Patient will complete simple grooming task standing at the sink with MOD I.     Timeframe: 7 visits      OCCUPATIONAL THERAPY: Daily Note    OT Visit Days: 2   Time In/Out  OT Charge Capture  Rehab Caseload Tracker  OT Orders    Kevin Clark is a 83 y.o. male   PRIMARY DIAGNOSIS: Fecal impaction (HCC)  Fecal impaction in rectum (HCC) [K56.41]  Fecal impaction (HCC) [K56.41]       Inpatient: Payor: MEDICARE / Plan: MEDICARE PART A AND B / Product Type: *No Product type* /     ASSESSMENT:     REHAB RECOMMENDATIONS:   Recommendation to date pending progress:  Setting:  Home Health Therapy    Equipment:    To Be Determined     ASSESSMENT:  Mr. Clark is progressing well towards OT goals. Today, pt was received supine in bed. Completed bed mobility, LB dressing, functional transfers, ambulation with rolling walker, and grooming tasks standing at the sink with SBA-CGA. Pt easily distracted and required cueing to remain on task/for safety. Pt with mild confusion though pleasant. Recommend return home with  therapy services at discharge. Mr. Clark continues to demonstrate overall deficits in strength, balance, activity tolerance, and ADL performance. Continue OT efforts and POC in order to address functional deficits and OT goals stated above.        SUBJECTIVE: 
ACUTE PHYSICAL THERAPY GOALS:   (Developed with and agreed upon by patient and/or caregiver.)  1. Patient will perform bed mobility with INDEPENDENCE within 7 days.  2. Patient will transfer bed to chair with MODIFIED INDEPENDENCE within 7 days.  3. Patient will demonstrate FAIR+ DYNAMIC STANDING balance within 7 day(s).  4. Patient will ambulate 150ft+ with MODIFIED INDEPENDENCE within 7 days.  5. Patient will tolerate 25+ minutes of therapeutic activity/exercise and/or neuromuscular re-education while maintaining stable vitals to improve functional strength and activity tolerance within 7 days.      PHYSICAL THERAPY Initial Assessment, Daily Note, and PM  (Link to Caseload Tracking: PT Visit Days : 1  Acknowledge Orders  Time In/Out  PT Charge Capture  Rehab Caseload Tracker    Kevin Clark is a 83 y.o. male   PRIMARY DIAGNOSIS: Fecal impaction (HCC)  Fecal impaction in rectum (HCC) [K56.41]  Fecal impaction (HCC) [K56.41]       Reason for Referral: Generalized Muscle Weakness (M62.81)  Difficulty in walking, Not elsewhere classified (R26.2)  Observation: Payor: MEDICARE / Plan: MEDICARE PART A AND B / Product Type: *No Product type* /     ASSESSMENT:     REHAB RECOMMENDATIONS:   Recommendation to date pending progress:  Setting:  Home Health Therapy    Equipment:     Continued use of patient's rollator     ASSESSMENT:  Mr. Clark is a pleasant 83 year old male admitted with fecal impaction. Patient is seen this PM for initial PT evaluation. At baseline, patient is a household and short distance community-level ambulator with use of a rollator and utilizes a power chair for longer community level distances. Today, patient presents with intact functional strength and activity tolerance. Minor balance deficits appreciated. Educated on falls precautions, home modification, and activity pacing; patient verbalized understanding with teach back. See detailed assessment/treatment below. Recommend HHPT at 
Discharge education completed with patient and family. pt given opportunity to ask questions. Pt made aware of upcoming appointment and educated on new med medications. Pt educated on when to seek medical attention if needed.     Pt discharged home with family. Taken out to car via wheelchair.     
END OF SHIFT SUMMARY:    Significant vitals this shift:  VSS  Significant labs this shift:  No  Tests performed this shift:  No   Orders to be followed up on:  No  Blood products given this shift:  No  Additional events this shift:   VSS no complaints of pain, 2 bowel movements during shift voiding well in bed call light within reach     I/Os:  +/- this shift: yes  05/10 0701 - 05/10 1900  In: 1276.3 [I.V.:1276.3]  Out: 75 [Urine:75]  Unmeasured Occurrences this Shift:  Urine yes, BM yes, Emesis no      Bedside shift report given to STAR Issa RN    
END OF SHIFT SUMMARY:    Significant vitals this shift:  VSS  Significant labs this shift:  no  Tests performed this shift:  no  Orders to be followed up on:  no  Blood products given this shift:  no  Additional events this shift:   VSS voiding well had bm this shift no complaints of pain or discomfort resting in bed call light within reach     I/Os:  +/- this shift: yes  05/11 0701 - 05/11 1900  In: 1903.4 [P.O.:720; I.V.:1183.4]  Out: 425 [Urine:425]  Unmeasured Occurrences this Shift:  Urine yes, BM yes, Emesis no      Bedside shift report given to STAR Issa RN    
Patient has had several BM's this shift. Patient continues to remove his urostomy pouch himself. Patient has had several different affects and behaviors this shift. Patient is AX0X4 with disorientation at times. No s/sx of acute nor respiratory distress noted nor reported so far this shift. Safety maintained.     Katherin Petit RN  
TRANSFER - IN REPORT:    Verbal report received from Elaina GRAVES on Kevin Clark  being received from ED for routine progression of patient care      Report consisted of patient's Situation, Background, Assessment and   Recommendations(SBAR).     Information from the following report(s) Nurse Handoff Report was reviewed with the receiving nurse.    Opportunity for questions and clarification was provided.      Assessment to be completed upon patient's arrival to unit and care assumed.    
Comments)   UE AROM [x] []   UE PROM [x] []   Strength []  Generalized weakness BUEs though functional for bADLs      Posture / Balance []  Intact sitting, fair+ standing    Sensation [x]     Coordination [x]       Tone [x]       Edema [x]    Activity Tolerance []  Generally decreased--fatigues with minimal activity, rest breaks required     Hand Dominance R [] L []      COGNITION/  PERCEPTION: INTACT IMPAIRED   (See Comments)   Orientation [x]     Vision [x]     Hearing [x]     Cognition  []  Easily distracted, poor safety awareness and insight   Perception []       MOBILITY: I Mod I S SBA CGA Min Mod Max Total  NT x2 Comments:   Bed Mobility    Rolling [] [] [] [] [] [] [] [] [] [x] []    Supine to Sit [] [] [] [] [x] [] [] [] [] [] []    Scooting [] [] [] [] [x] [] [] [] [] [] []    Sit to Supine [] [] [] [] [] [] [] [] [] [x] [] Left sitting in the chair   Transfers    Sit to Stand [] [] [] [] [x] [] [] [] [] [] []    Bed to Chair [] [] [] [] [x] [] [] [] [] [] [] RW   Stand to Sit [] [] [] [] [x] [] [] [] [] [] []    Tub/Shower [] [] [] [] [] [] [] [] [] [x] []     Toilet [] [] [] [] [] [] [] [] [] [x] []      [] [] [] [] [] [] [] [] [] [] []    I=Independent, Mod I=Modified Independent, S=Supervision/Setup, SBA=Standby Assistance, CGA=Contact Guard Assistance, Min=Minimal Assistance, Mod=Moderate Assistance, Max=Maximal Assistance, Total=Total Assistance, NT=Not Tested    ACTIVITIES OF DAILY LIVING: I Mod I S SBA CGA Min Mod Max Total NT Comments   BASIC ADLs:              Upper Body Bathing  [] [] [] [] [] [] [] [] [] [x]     Lower Body Bathing [] [] [] [] [] [] [] [] [] [x]     Toileting [] [] [] [] [] [] [] [] [] [x]    Upper Body Dressing [] [] [] [] [] [] [] [] [] [x]    Lower Body Dressing [] [] [] [] [x] [] [] [] [] [] Socks    Feeding [] [] [] [] [] [] [] [] [] [x]    Grooming [] [] [] [] [] [] [] [] [] [x]    Personal Device Care [] [] [] [] [] [] [] [] [] [x]    Functional Mobility [] [] [] [] [x] [] 
FLUoxetine (PROZAC) capsule 20 mg    losartan (COZAAR) tablet 100 mg    atorvastatin (LIPITOR) tablet 40 mg    sodium chloride flush 0.9 % injection 5-40 mL    sodium chloride flush 0.9 % injection 5-40 mL    0.9 % sodium chloride infusion    OR Linked Order Group     potassium chloride (KLOR-CON M) extended release tablet 40 mEq     potassium bicarb-citric acid (EFFER-K) effervescent tablet 40 mEq     potassium chloride 10 mEq/100 mL IVPB (Peripheral Line)    magnesium sulfate 2000 mg in 50 mL IVPB premix    OR Linked Order Group     ondansetron (ZOFRAN-ODT) disintegrating tablet 4 mg     ondansetron (ZOFRAN) injection 4 mg    polyethylene glycol (GLYCOLAX) packet 17 g    OR Linked Order Group     acetaminophen (TYLENOL) tablet 650 mg     acetaminophen (TYLENOL) suppository 650 mg    sennosides-docusate sodium (SENOKOT-S) 8.6-50 MG tablet 2 tablet    lactulose (CHRONULAC) 10 GM/15ML solution 20 g    bisacodyl (DULCOLAX) suppository 10 mg    cefTRIAXone (ROCEPHIN) 1,000 mg in sterile water 10 mL IV syringe     Order Specific Question:   Antimicrobial Indications     Answer:   Urinary Tract Infection     Order Specific Question:   UTI duration of therapy     Answer:   5 days    0.9 % sodium chloride infusion       Prior to Admit Medications:  Current Outpatient Medications   Medication Instructions    aspirin 81 mg, Oral, DAILY    FLUoxetine (PROZAC) 20 mg, Oral, DAILY    losartan (COZAAR) 100 mg, Oral, DAILY    simvastatin (ZOCOR) 80 MG tablet TAKE 1 TABLET EVERY NIGHT AT BEDTIME       I have personally reviewed labs and tests:  Recent Results (from the past 24 hour(s))   Basic Metabolic Panel w/ Reflex to MG    Collection Time: 05/10/24  4:57 AM   Result Value Ref Range    Sodium 141 136 - 145 mmol/L    Potassium 4.0 3.5 - 5.1 mmol/L    Chloride 106 98 - 107 mmol/L    CO2 25 20 - 28 mmol/L    Anion Gap 10 9 - 18 mmol/L    Glucose 100 (H) 70 - 99 mg/dL    BUN 19 8 - 23 MG/DL    Creatinine 1.34 (H) 0.80 - 1.30 MG/DL 
LARGE (A) NEG      WBC, UA >100 0 /hpf    RBC, UA 10-20 0 /hpf    BACTERIA, URINE 4+ (H) 0 /hpf    Urine Culture if Indicated URINE CULTURE ORDERED      Epithelial Cells UA 10-20 0 /hpf    Casts 0 0 /lpf    Crystals 0 0 /LPF    Amorphous Crystal 4+ (H) 0    Mucus, UA 2+ (H) 0 /lpf    Other observations RESULTS VERIFIED MANUALLY     Culture, Urine    Collection Time: 05/09/24  1:20 AM    Specimen: Urine   Result Value Ref Range    Special Requests NO SPECIAL REQUESTS  Reflexed from [id number]        Culture        No growth after short period of incubation. Further results to follow after overnight incubation.   Basic Metabolic Panel w/ Reflex to MG    Collection Time: 05/09/24  4:35 AM   Result Value Ref Range    Sodium 140 136 - 145 mmol/L    Potassium 4.3 3.5 - 5.1 mmol/L    Chloride 103 98 - 107 mmol/L    CO2 27 20 - 28 mmol/L    Anion Gap 10 9 - 18 mmol/L    Glucose 107 (H) 70 - 99 mg/dL    BUN 18 8 - 23 MG/DL    Creatinine 1.45 (H) 0.80 - 1.30 MG/DL    Est, Glom Filt Rate 48 (L) >60 ml/min/1.73m2    Calcium 8.9 8.8 - 10.2 MG/DL   CBC with Auto Differential    Collection Time: 05/09/24  4:35 AM   Result Value Ref Range    WBC 6.5 4.3 - 11.1 K/uL    RBC 4.39 4.23 - 5.6 M/uL    Hemoglobin 13.2 (L) 13.6 - 17.2 g/dL    Hematocrit 40.2 (L) 41.1 - 50.3 %    MCV 91.6 82 - 102 FL    MCH 30.1 26.1 - 32.9 PG    MCHC 32.8 31.4 - 35.0 g/dL    RDW 13.7 11.9 - 14.6 %    Platelets 144 (L) 150 - 450 K/uL    MPV 9.3 (L) 9.4 - 12.3 FL    nRBC 0.00 0.0 - 0.2 K/uL    Differential Type AUTOMATED      Neutrophils % 49 43 - 78 %    Lymphocytes % 36 13 - 44 %    Monocytes % 10 4.0 - 12.0 %    Eosinophils % 4 0.5 - 7.8 %    Basophils % 1 0.0 - 2.0 %    Immature Granulocytes % 0 0.0 - 5.0 %    Neutrophils Absolute 3.2 1.7 - 8.2 K/UL    Lymphocytes Absolute 2.4 0.5 - 4.6 K/UL    Monocytes Absolute 0.7 0.1 - 1.3 K/UL    Eosinophils Absolute 0.3 0.0 - 0.8 K/UL    Basophils Absolute 0.0 0.0 - 0.2 K/UL    Immature Granulocytes Absolute 0.0 0.0 
There is mild cholelithiasis.  No ductal dilation. Pancreas:  Unremarkable.  No mass.  No ductal dilation. Spleen:  Unremarkable.  No splenomegaly. Adrenals:  Unremarkable.  No mass. Kidneys and ureters:  See below. Stomach and bowel:  There is fecal impaction and stercoral colitis involving the rectum.  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 PELVIS: Appendix:  No findings to suggest acute appendicitis. Bladder:  Surgical changes of cystectomy with urinary diversion are noted.  Interval left double-J stent noted which appears appropriately positioned.  There is mild left hydronephrosis.  There stranding and hyperemia involving the left renal collecting system and left ureter suggestive of pyelitis/ureteritis. Reproductive:  Unremarkable as visualized. ABDOMEN and PELVIS: Intraperitoneal space:  Unremarkable.  No free air.  No significant fluid collection. Bones/joints:  No acute fracture.  No dislocation. Soft tissues:  There is a fat-containing left inguinal hernia. Vasculature:  Unremarkable.  No abdominal aortic aneurysm. Lymph nodes:  Unremarkable.  No enlarged lymph nodes.     1.  Surgical changes of cystectomy with urinary diversion are noted. Interval left double-J stent noted which appears appropriately positioned.  There is mild left hydronephrosis.  There stranding and hyperemia involving the left renal collecting system and left ureter suggestive of pyelitis/ureteritis. 2.  There is fecal impaction and stercoral colitis involving the rectum. 3.  There is mild cholelithiasis. 4.  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 5.  There is a fat-containing left inguinal hernia. Automatic exposure control was used as a dose lowering technique. Radiation Dose: CTDI is 9.65 mGy. DLP is 605.19 mGy-cm. Contrast Type: iopamidol (ISOVUE-370) 76 . Contrast Volume: 100 mL Report signed on 05/08/2024 (03:11

## 2024-05-14 ENCOUNTER — CARE COORDINATION (OUTPATIENT)
Dept: CARE COORDINATION | Facility: CLINIC | Age: 84
End: 2024-05-14

## 2024-05-14 DIAGNOSIS — K56.41 FECAL IMPACTION (HCC): Primary | ICD-10-CM

## 2024-05-14 PROCEDURE — 1111F DSCHRG MED/CURRENT MED MERGE: CPT | Performed by: INTERNAL MEDICINE

## 2024-05-14 NOTE — CARE COORDINATION
appropriate site of care based on symptoms and resources available to patient including: PCP  Home health  When to call 911  CTN . The spouse/partner and caregiver agrees to contact the PCP office for questions related to their healthcare.     Advance Care Planning:   Does patient have an Advance Directive: not on file.    Medication reconciliation was performed with spouse/partner and caregiver, who verbalizes understanding of administration of home medications. Medications reviewed, 1111F entered: yes    Was patient discharged with a pulse oximeter? yes, discussed and confirmed pulse oximeter discharge instructions and when to notify provider or seek emergency care.    Non-face-to-face services provided:  Scheduled appointment with PCP-5/20  Obtained and reviewed discharge summary and/or continuity of care documents      Care Transitions 24 Hour Call    Care Transitions Interventions         Discussed follow-up appointments. If no appointment was previously scheduled, appointment scheduling offered: Yes.   Is follow up appointment scheduled within 7 days of discharge? Yes.    Follow Up  Future Appointments   Date Time Provider Department Center   5/20/2024  2:20 PM [facility]       Care Transition Nurse provided contact information.  Plan for follow-up call in 5-7 days based on severity of symptoms and risk factors.  Plan for next call:  CTN to hand off patient to INDRA Garcia for follow up after patient appt next week. Review TC visit notes and address any acute needs that may arise. Ensure HH SOC    Ajay Huitron RN

## 2024-05-20 ENCOUNTER — HOSPITAL ENCOUNTER (EMERGENCY)
Age: 84
Discharge: HOME | End: 2024-05-20
Attending: EMERGENCY MEDICINE
Payer: MEDICARE

## 2024-05-20 VITALS
WEIGHT: 166.3 LBS | TEMPERATURE: 97.9 F | SYSTOLIC BLOOD PRESSURE: 141 MMHG | RESPIRATION RATE: 18 BRPM | HEART RATE: 70 BPM | BODY MASS INDEX: 23.81 KG/M2 | DIASTOLIC BLOOD PRESSURE: 83 MMHG | HEIGHT: 70 IN | OXYGEN SATURATION: 99 %

## 2024-05-20 DIAGNOSIS — K40.90 INGUINAL HERNIA, LEFT: Primary | ICD-10-CM

## 2024-05-20 PROCEDURE — 99283 EMERGENCY DEPT VISIT LOW MDM: CPT

## 2024-05-20 ASSESSMENT — PAIN SCALES - GENERAL: PAINLEVEL_OUTOF10: 3

## 2024-05-20 ASSESSMENT — PAIN - FUNCTIONAL ASSESSMENT: PAIN_FUNCTIONAL_ASSESSMENT: 0-10

## 2024-05-20 ASSESSMENT — LIFESTYLE VARIABLES
HOW MANY STANDARD DRINKS CONTAINING ALCOHOL DO YOU HAVE ON A TYPICAL DAY: PATIENT DOES NOT DRINK
HOW OFTEN DO YOU HAVE A DRINK CONTAINING ALCOHOL: NEVER

## 2024-05-20 ASSESSMENT — PAIN DESCRIPTION - LOCATION: LOCATION: ABDOMEN

## 2024-05-20 NOTE — ED TRIAGE NOTES
Pt 84 y/o male arrives via [facility] em[facility] from home with a complaint of pain upon palpation to left side of abdomen. Pt states he had surgery about a week ago related to a abdominal surgery.

## 2024-05-20 NOTE — ED PROVIDER NOTES
2014, two sons     Social Determinants of Health     Financial Resource Strain: Low Risk  (8/4/2023)    Overall Financial Resource Strain (CARDIA)     Difficulty of Paying Living Expenses: Not hard at all   Food Insecurity: No Food Insecurity (5/8/2024)    Hunger Vital Sign     Worried About Running Out of Food in the Last Year: Never true     Ran Out of Food in the Last Year: Never true   Transportation Needs: No Transportation Needs (5/8/2024)    PRAPARE - Transportation     Lack of Transportation (Medical): No     Lack of Transportation (Non-Medical): No   Housing Stability: Low Risk  (5/8/2024)    Housing Stability Vital Sign     Unable to Pay for Housing in the Last Year: No     Number of Places Lived in the Last Year: 1     Unstable Housing in the Last Year: No        Previous Medications    ASPIRIN 81 MG EC TABLET    Take 1 tablet by mouth daily    FLUOXETINE (PROZAC) 20 MG CAPSULE    TAKE 1 CAPSULE EVERY DAY    LOSARTAN (COZAAR) 100 MG TABLET    TAKE 1 TABLET EVERY DAY    POLYETHYLENE GLYCOL (GLYCOLAX) 17 GM/SCOOP POWDER    Take 17 g by mouth daily as needed (constipation)    SENNOSIDES-DOCUSATE SODIUM (SENOKOT-S) 8.6-50 MG TABLET    Take 2 tablets by mouth daily    SIMVASTATIN (ZOCOR) 80 MG TABLET    TAKE 1 TABLET EVERY NIGHT AT BEDTIME        No results found for any visits on 05/20/24.      No orders to display                No results for input(s): \"COVID19\" in the last 72 hours.     Voice dictation software was used during the making of this note.  This software is not perfect and grammatical and other typographical errors may be present.  This note has not been completely proofread for errors.     Raheem Penn MD  05/20/24 [id number]

## 2024-05-20 NOTE — DISCHARGE INSTRUCTIONS
As we discussed, you have an inguinal hernia on the left.  If it begins to ache or feel sore you should lay down and relax your abdominal muscles and you can slowly push it back in your belly as we did here in the ER.  You can also try to buy a pair of marina underwear which can help provide additional support to prevent the hernia from getting worse.

## 2024-05-20 NOTE — ED NOTES
Patient mobility status  with difficulty, uses a walker. Provider aware     I have reviewed discharge instructions with the patient.  The patient verbalized understanding.    Patient left ED via Discharge Method: stretcher to Home with  [facility] .    Opportunity for questions and clarification provided.     Patient given 0 scripts.           Kim Gomez RN  05/20/24 [id number]

## 2024-05-21 ENCOUNTER — CARE COORDINATION (OUTPATIENT)
Dept: CARE COORDINATION | Facility: CLINIC | Age: 84
End: 2024-05-21

## 2024-05-21 NOTE — CARE COORDINATION
Care Transitions Outreach Attempt    Call within 2 business days of discharge: Yes   Attempted to reach patient/caregiver for transitions of care follow up call. Unable to reach patient/caregiver, unable to leave voice message or contact information. Will attempt to contact next business day.  Patient: Kevin Clark Patient : 1940 MRN: [id number]    Last Discharge Facility       Date Complaint Diagnosis Description Type Department Provider    24 Abdominal Pain Inguinal hernia, left ED (DISCHARGE) Raheem Dao MD; Ronal Watson..              Was this an external facility discharge? No Discharge Facility Name: [facility].    Noted following upcoming appointments from discharge chart review:      follow up appointment(s):   Future Appointments   Date Time Provider Department Center   7/15/2024  3:15 PM Roberth Johnston MD [facility]         follow up appointment(s):

## 2024-05-22 ENCOUNTER — CARE COORDINATION (OUTPATIENT)
Dept: CARE COORDINATION | Facility: CLINIC | Age: 84
End: 2024-05-22

## 2024-05-22 NOTE — CARE COORDINATION
Care Transitions Outreach Attempt    Call within 2 business days of discharge: Yes   2nd Attempted to reach patient/caregiver for transitions of care follow up call. Unable to reach patient/caregiver, unable to leave a voice mail, mailbox is full. Will re-open if phone call is returned.    Patient: Kevin Clark Patient : 1940 MRN: [id number]    Last Discharge Facility       Date Complaint Diagnosis Description Type Department Provider    24 Abdominal Pain Inguinal hernia, left ED (DISCHARGE) Raheem Dao MD; Ronal Watson..              Was this an external facility discharge? No Discharge Facility Name: [facility]    Noted following upcoming appointments from discharge chart review:      follow up appointment(s):   Future Appointments   Date Time Provider Department Center   7/15/2024  3:15 PM Roberth Johnston MD [facility]         follow up appointment(s):

## 2024-06-19 DIAGNOSIS — I25.810 CORONARY ARTERY DISEASE INVOLVING CORONARY BYPASS GRAFT OF NATIVE HEART WITHOUT ANGINA PECTORIS: ICD-10-CM

## 2024-06-19 DIAGNOSIS — E78.2 MIXED HYPERLIPIDEMIA: ICD-10-CM

## 2024-06-19 RX ORDER — SIMVASTATIN 80 MG
80 TABLET ORAL NIGHTLY
Qty: 90 TABLET | Refills: 3 | OUTPATIENT
Start: 2024-06-19

## 2024-07-15 ENCOUNTER — TELEPHONE (OUTPATIENT)
Dept: GASTROENTEROLOGY | Age: 84
End: 2024-07-15

## 2024-07-15 ENCOUNTER — TELEPHONE (OUTPATIENT)
Dept: INTERNAL MEDICINE CLINIC | Facility: CLINIC | Age: 84
End: 2024-07-15

## 2024-07-15 NOTE — TELEPHONE ENCOUNTER
LVM for patient requesting his appointment be moved up to a little earlier in the afternoon per Dr. Johnston

## 2024-09-09 ENCOUNTER — TELEPHONE (OUTPATIENT)
Dept: INTERNAL MEDICINE CLINIC | Facility: CLINIC | Age: 84
End: 2024-09-09

## 2024-09-21 ENCOUNTER — APPOINTMENT (OUTPATIENT)
Dept: GENERAL RADIOLOGY | Age: 84
End: 2024-09-21
Payer: MEDICARE

## 2024-09-21 ENCOUNTER — HOSPITAL ENCOUNTER (EMERGENCY)
Age: 84
Discharge: HOME | End: 2024-09-21
Attending: STUDENT IN AN ORGANIZED HEALTH CARE EDUCATION/TRAINING PROGRAM
Payer: MEDICARE

## 2024-09-21 VITALS
DIASTOLIC BLOOD PRESSURE: 103 MMHG | WEIGHT: 163.9 LBS | OXYGEN SATURATION: 99 % | BODY MASS INDEX: 23.52 KG/M2 | TEMPERATURE: 98.4 F | SYSTOLIC BLOOD PRESSURE: 150 MMHG | HEART RATE: 95 BPM | RESPIRATION RATE: 16 BRPM

## 2024-09-21 DIAGNOSIS — I10 ESSENTIAL (PRIMARY) HYPERTENSION: ICD-10-CM

## 2024-09-21 DIAGNOSIS — R10.84 GENERALIZED ABDOMINAL PAIN: ICD-10-CM

## 2024-09-21 DIAGNOSIS — K40.91 UNILATERAL RECURRENT INGUINAL HERNIA WITHOUT OBSTRUCTION OR GANGRENE: Primary | ICD-10-CM

## 2024-09-21 DIAGNOSIS — I25.810 CORONARY ARTERY DISEASE INVOLVING CORONARY BYPASS GRAFT OF NATIVE HEART WITHOUT ANGINA PECTORIS: ICD-10-CM

## 2024-09-21 DIAGNOSIS — E78.2 MIXED HYPERLIPIDEMIA: ICD-10-CM

## 2024-09-21 LAB
ALBUMIN SERPL-MCNC: 4 G/DL (ref 3.2–4.6)
ALBUMIN/GLOB SERPL: 1 (ref 1–1.9)
ALP SERPL-CCNC: 76 U/L (ref 40–129)
ALT SERPL-CCNC: 18 U/L (ref 12–65)
ANION GAP SERPL CALC-SCNC: 12 MMOL/L (ref 9–18)
AST SERPL-CCNC: 20 U/L (ref 15–37)
BASOPHILS # BLD: 0 K/UL (ref 0–0.2)
BASOPHILS NFR BLD: 0 % (ref 0–2)
BILIRUB SERPL-MCNC: 1 MG/DL (ref 0–1.2)
BUN SERPL-MCNC: 23 MG/DL (ref 8–23)
CALCIUM SERPL-MCNC: 9.4 MG/DL (ref 8.8–10.2)
CHLORIDE SERPL-SCNC: 101 MMOL/L (ref 98–107)
CO2 SERPL-SCNC: 25 MMOL/L (ref 20–28)
CREAT SERPL-MCNC: 1.61 MG/DL (ref 0.8–1.3)
DIFFERENTIAL METHOD BLD: ABNORMAL
EOSINOPHIL # BLD: 0.1 K/UL (ref 0–0.8)
EOSINOPHIL NFR BLD: 1 % (ref 0.5–7.8)
ERYTHROCYTE [DISTWIDTH] IN BLOOD BY AUTOMATED COUNT: 13.9 % (ref 11.9–14.6)
GLOBULIN SER CALC-MCNC: 3.8 G/DL (ref 2.3–3.5)
GLUCOSE SERPL-MCNC: 137 MG/DL (ref 70–99)
HCT VFR BLD AUTO: 43 % (ref 41.1–50.3)
HGB BLD-MCNC: 14.4 G/DL (ref 13.6–17.2)
IMM GRANULOCYTES # BLD AUTO: 0 K/UL (ref 0–0.5)
IMM GRANULOCYTES NFR BLD AUTO: 0 % (ref 0–5)
LACTATE SERPL-SCNC: 1.9 MMOL/L (ref 0.5–2)
LACTATE SERPL-SCNC: 2.1 MMOL/L (ref 0.5–2)
LIPASE SERPL-CCNC: 57 U/L (ref 13–60)
LYMPHOCYTES # BLD: 1.5 K/UL (ref 0.5–4.6)
LYMPHOCYTES NFR BLD: 16 % (ref 13–44)
MCH RBC QN AUTO: 30.3 PG (ref 26.1–32.9)
MCHC RBC AUTO-ENTMCNC: 33.5 G/DL (ref 31.4–35)
MCV RBC AUTO: 90.3 FL (ref 82–102)
MONOCYTES # BLD: 0.9 K/UL (ref 0.1–1.3)
MONOCYTES NFR BLD: 10 % (ref 4–12)
NEUTS SEG # BLD: 6.6 K/UL (ref 1.7–8.2)
NEUTS SEG NFR BLD: 73 % (ref 43–78)
NRBC # BLD: 0 K/UL (ref 0–0.2)
PLATELET # BLD AUTO: 161 K/UL (ref 150–450)
PMV BLD AUTO: 8.9 FL (ref 9.4–12.3)
POTASSIUM SERPL-SCNC: 4.4 MMOL/L (ref 3.5–5.1)
PROT SERPL-MCNC: 7.8 G/DL (ref 6.3–8.2)
RBC # BLD AUTO: 4.76 M/UL (ref 4.23–5.6)
SODIUM SERPL-SCNC: 139 MMOL/L (ref 136–145)
WBC # BLD AUTO: 9.1 K/UL (ref 4.3–11.1)

## 2024-09-21 PROCEDURE — 74022 RADEX COMPL AQT ABD SERIES: CPT

## 2024-09-21 PROCEDURE — 85025 COMPLETE CBC W/AUTO DIFF WBC: CPT

## 2024-09-21 PROCEDURE — 83605 ASSAY OF LACTIC ACID: CPT

## 2024-09-21 PROCEDURE — 83690 ASSAY OF LIPASE: CPT

## 2024-09-21 PROCEDURE — 99284 EMERGENCY DEPT VISIT MOD MDM: CPT

## 2024-09-21 PROCEDURE — 80053 COMPREHEN METABOLIC PANEL: CPT

## 2024-09-21 PROCEDURE — 36415 COLL VENOUS BLD VENIPUNCTURE: CPT

## 2024-09-21 RX ORDER — LOSARTAN POTASSIUM 100 MG/1
100 TABLET ORAL DAILY
Qty: 30 TABLET | Refills: 0 | Status: SHIPPED | OUTPATIENT
Start: 2024-09-21

## 2024-09-21 RX ORDER — SIMVASTATIN 80 MG
80 TABLET ORAL NIGHTLY
Qty: 90 TABLET | Refills: 0 | Status: SHIPPED | OUTPATIENT
Start: 2024-09-21

## 2024-10-14 ENCOUNTER — TELEPHONE (OUTPATIENT)
Dept: INTERNAL MEDICINE CLINIC | Facility: CLINIC | Age: 84
End: 2024-10-14

## 2024-10-14 NOTE — TELEPHONE ENCOUNTER
Called and [facility] that we will not be able to sign off on orders due pt not coming into the office to be seen. Also after reviewing pt's chart pt has a new PCP. Also  informed Neema of this. Ty

## 2025-01-14 ENCOUNTER — TELEPHONE (OUTPATIENT)
Dept: INTERNAL MEDICINE CLINIC | Facility: CLINIC | Age: 85
End: 2025-01-14

## 2025-01-14 NOTE — TELEPHONE ENCOUNTER
Our office received home health orders from [facility]. Order # [id number]. I have faxed the orders back to [facility] and informed them that this patient no longer under  care

## 2025-02-23 ENCOUNTER — APPOINTMENT (OUTPATIENT)
Dept: GENERAL RADIOLOGY | Age: 85
End: 2025-02-23
Payer: MEDICARE

## 2025-02-23 ENCOUNTER — HOSPITAL ENCOUNTER (EMERGENCY)
Age: 85
Discharge: HOME | End: 2025-02-24
Attending: EMERGENCY MEDICINE
Payer: MEDICARE

## 2025-02-23 DIAGNOSIS — K59.00 CONSTIPATION, UNSPECIFIED CONSTIPATION TYPE: Primary | ICD-10-CM

## 2025-02-23 PROCEDURE — 99283 EMERGENCY DEPT VISIT LOW MDM: CPT

## 2025-02-23 ASSESSMENT — PAIN - FUNCTIONAL ASSESSMENT: PAIN_FUNCTIONAL_ASSESSMENT: NONE - DENIES PAIN

## 2025-02-24 ENCOUNTER — APPOINTMENT (OUTPATIENT)
Dept: GENERAL RADIOLOGY | Age: 85
End: 2025-02-24
Payer: MEDICARE

## 2025-02-24 VITALS
SYSTOLIC BLOOD PRESSURE: 126 MMHG | RESPIRATION RATE: 18 BRPM | TEMPERATURE: 97.5 F | DIASTOLIC BLOOD PRESSURE: 82 MMHG | HEART RATE: 89 BPM | OXYGEN SATURATION: 99 %

## 2025-02-24 PROCEDURE — 74019 RADEX ABDOMEN 2 VIEWS: CPT

## 2025-02-24 RX ORDER — POLYETHYLENE GLYCOL 3350 17 G/17G
17 POWDER, FOR SOLUTION ORAL 2 TIMES DAILY
Qty: 578 G | Refills: 0 | Status: SHIPPED | OUTPATIENT
Start: 2025-02-24 | End: 2025-02-24

## 2025-02-24 RX ORDER — DOCUSATE SODIUM 100 MG/1
100 CAPSULE, LIQUID FILLED ORAL 2 TIMES DAILY
Qty: 60 CAPSULE | Refills: 0 | Status: SHIPPED | OUTPATIENT
Start: 2025-02-24

## 2025-02-24 RX ORDER — DOCUSATE SODIUM 100 MG/1
100 CAPSULE, LIQUID FILLED ORAL 2 TIMES DAILY
Qty: 60 CAPSULE | Refills: 0 | Status: SHIPPED | OUTPATIENT
Start: 2025-02-24 | End: 2025-02-24

## 2025-02-24 RX ORDER — POLYETHYLENE GLYCOL 3350 17 G/17G
17 POWDER, FOR SOLUTION ORAL 2 TIMES DAILY
Qty: 578 G | Refills: 0 | Status: SHIPPED | OUTPATIENT
Start: 2025-02-24 | End: 2025-03-13

## 2025-02-24 NOTE — DISCHARGE INSTRUCTIONS
Take medications as prescribed to help with your constipation  Drink plenty of fluids  Stay as active as possible  Eat high-fiber diet    Continue all your current medication    Call your doctor or the follow up doctor to set up appointment for recheck visit    Return to ER for any worsening symptoms or new problems which may arise

## 2025-02-24 NOTE — ED PROVIDER NOTES
Emergency Department Provider Note       PCP: Rudi Mcneil MD   Age: 84 y.o.   Sex: male     DISPOSITION Decision To Discharge 02/24/2025 12:36:32 AM    ICD-10-CM    1. Constipation, unspecified constipation type  K59.00           Medical Decision Making     84-year-old male patient presented to the ER with abdominal pain decrease stool output for 2 to 3 days  While here in the ER the patient had a spontaneous bowel movement.  Likely related to the medications that he had tried earlier in the day  Plain images of the abdomen reveal some mild gaseous distention but no evidence of free air or obstruction  Patient will be discharged  stool softeners and MiraLAX to treat his constipation  Referred back to his primary care doctor for close follow-up     1 acute illness with systemic symptoms.  Prescription drug management performed.  Patient was discharged risks and benefits of hospitalization were considered.  Shared medical decision making was utilized in creating the patients health plan today.  I independently ordered and reviewed each unique test.    I reviewed external records: provider visit note from PCP.  I reviewed external records: provider visit note from outside specialist.       I interpreted the X-rays flat and upright of the abdomen reveal some gaseous distention but no evidence of free air or obstruction.              History     84-year-old male patient presents to the ER with concerns over constipation  Does have a history of dementia CAD and bladder cancer  Currently states he feels like he is having some stool incontinence  No fever or vomiting  Mild generalized abdominal discomfort    The history is provided by the patient and the EMS personnel.   Constipation  Severity:  Moderate  Timing:  Constant  Progression:  Worsening  Chronicity:  Recurrent  Context: not dehydration and not medication    Stool description:  Unable to specify  Relieved by:  Nothing  Worsened by:  Nothing  Ineffective

## 2025-02-24 NOTE — ED NOTES
[facility] her to transport pt home     Son was called to let him know we were sending home by EMS     Sinai Andres, RN  02/24/25 0144

## 2025-02-24 NOTE — ED TRIAGE NOTES
Patient arrives via EMS from home, moved in with sonPierce, yesterday. C/o rectal pain and constipation for 2 days. Suppository tried at home with no relief. Hx of hemorrhoids and dementia.

## 2025-02-25 ENCOUNTER — TELEPHONE (OUTPATIENT)
Dept: INTERNAL MEDICINE CLINIC | Facility: CLINIC | Age: 85
End: 2025-02-25

## 2025-02-25 NOTE — TELEPHONE ENCOUNTER
Received HH orders from [facility] requesting a signature and marked as urgent. Faxed back informing them that pt is no longer our patient. ty

## 2025-03-06 SDOH — HEALTH STABILITY: PHYSICAL HEALTH: ON AVERAGE, HOW MANY DAYS PER WEEK DO YOU ENGAGE IN MODERATE TO STRENUOUS EXERCISE (LIKE A BRISK WALK)?: 0 DAYS

## 2025-03-06 SDOH — HEALTH STABILITY: PHYSICAL HEALTH: ON AVERAGE, HOW MANY MINUTES DO YOU ENGAGE IN EXERCISE AT THIS LEVEL?: 0 MIN

## 2025-03-07 ENCOUNTER — OFFICE VISIT (OUTPATIENT)
Dept: FAMILY MEDICINE CLINIC | Facility: CLINIC | Age: 85
End: 2025-03-07
Payer: MEDICAID

## 2025-03-07 ENCOUNTER — LAB (OUTPATIENT)
Dept: FAMILY MEDICINE CLINIC | Facility: CLINIC | Age: 85
End: 2025-03-07

## 2025-03-07 VITALS
HEIGHT: 70 IN | DIASTOLIC BLOOD PRESSURE: 69 MMHG | HEART RATE: 70 BPM | BODY MASS INDEX: 20.33 KG/M2 | TEMPERATURE: 98.2 F | SYSTOLIC BLOOD PRESSURE: 109 MMHG | WEIGHT: 142 LBS

## 2025-03-07 DIAGNOSIS — I25.810 CORONARY ARTERY DISEASE INVOLVING CORONARY BYPASS GRAFT OF NATIVE HEART WITHOUT ANGINA PECTORIS: ICD-10-CM

## 2025-03-07 DIAGNOSIS — E78.2 MIXED HYPERLIPIDEMIA: ICD-10-CM

## 2025-03-07 DIAGNOSIS — F32.5 MAJOR DEPRESSIVE DISORDER WITH SINGLE EPISODE, IN FULL REMISSION: ICD-10-CM

## 2025-03-07 DIAGNOSIS — R73.03 PREDIABETES: ICD-10-CM

## 2025-03-07 DIAGNOSIS — N18.31 STAGE 3A CHRONIC KIDNEY DISEASE (HCC): ICD-10-CM

## 2025-03-07 DIAGNOSIS — I10 PRIMARY HYPERTENSION: ICD-10-CM

## 2025-03-07 DIAGNOSIS — I48.3 TYPICAL ATRIAL FLUTTER (HCC): ICD-10-CM

## 2025-03-07 DIAGNOSIS — R53.81 DEBILITY: ICD-10-CM

## 2025-03-07 DIAGNOSIS — R73.03 PREDIABETES: Primary | ICD-10-CM

## 2025-03-07 PROBLEM — C67.9 MALIGNANT NEOPLASM OF URINARY BLADDER, UNSPECIFIED SITE (HCC): Status: RESOLVED | Noted: 2023-02-23 | Resolved: 2025-03-07

## 2025-03-07 LAB
ALBUMIN SERPL-MCNC: 2.7 G/DL (ref 3.2–4.6)
ALBUMIN/GLOB SERPL: 0.6 (ref 1–1.9)
ALP SERPL-CCNC: 87 U/L (ref 40–129)
ALT SERPL-CCNC: 36 U/L (ref 8–55)
ANION GAP SERPL CALC-SCNC: 12 MMOL/L (ref 7–16)
AST SERPL-CCNC: 35 U/L (ref 15–37)
BASOPHILS # BLD: 0.05 K/UL (ref 0–0.2)
BASOPHILS NFR BLD: 0.7 % (ref 0–2)
BILIRUB SERPL-MCNC: 0.5 MG/DL (ref 0–1.2)
BUN SERPL-MCNC: 60 MG/DL (ref 8–23)
CALCIUM SERPL-MCNC: 8.9 MG/DL (ref 8.8–10.2)
CHLORIDE SERPL-SCNC: 101 MMOL/L (ref 98–107)
CHOLEST SERPL-MCNC: 127 MG/DL (ref 0–200)
CO2 SERPL-SCNC: 24 MMOL/L (ref 20–29)
CREAT SERPL-MCNC: 2.47 MG/DL (ref 0.8–1.3)
DIFFERENTIAL METHOD BLD: ABNORMAL
EOSINOPHIL # BLD: 0.17 K/UL (ref 0–0.8)
EOSINOPHIL NFR BLD: 2.3 % (ref 0.5–7.8)
ERYTHROCYTE [DISTWIDTH] IN BLOOD BY AUTOMATED COUNT: 14.7 % (ref 11.9–14.6)
EST. AVERAGE GLUCOSE BLD GHB EST-MCNC: 131 MG/DL
GLOBULIN SER CALC-MCNC: 4.8 G/DL (ref 2.3–3.5)
GLUCOSE SERPL-MCNC: 169 MG/DL (ref 70–99)
HBA1C MFR BLD: 6.2 % (ref 0–5.6)
HCT VFR BLD AUTO: 40.3 % (ref 41.1–50.3)
HDLC SERPL-MCNC: 22 MG/DL (ref 40–60)
HDLC SERPL: 5.7 (ref 0–5)
HGB BLD-MCNC: 13.2 G/DL (ref 13.6–17.2)
IMM GRANULOCYTES # BLD AUTO: 0.13 K/UL (ref 0–0.5)
IMM GRANULOCYTES NFR BLD AUTO: 1.8 % (ref 0–5)
LDLC SERPL CALC-MCNC: 83 MG/DL (ref 0–100)
LYMPHOCYTES # BLD: 1.64 K/UL (ref 0.5–4.6)
LYMPHOCYTES NFR BLD: 22.5 % (ref 13–44)
MCH RBC QN AUTO: 29.5 PG (ref 26.1–32.9)
MCHC RBC AUTO-ENTMCNC: 32.8 G/DL (ref 31.4–35)
MCV RBC AUTO: 90 FL (ref 82–102)
MONOCYTES # BLD: 0.57 K/UL (ref 0.1–1.3)
MONOCYTES NFR BLD: 7.8 % (ref 4–12)
NEUTS SEG # BLD: 4.74 K/UL (ref 1.7–8.2)
NEUTS SEG NFR BLD: 64.9 % (ref 43–78)
NRBC # BLD: 0 K/UL (ref 0–0.2)
PLATELET # BLD AUTO: 334 K/UL (ref 150–450)
PMV BLD AUTO: 9.6 FL (ref 9.4–12.3)
POTASSIUM SERPL-SCNC: 3.9 MMOL/L (ref 3.5–5.1)
PROT SERPL-MCNC: 7.5 G/DL (ref 6.3–8.2)
RBC # BLD AUTO: 4.48 M/UL (ref 4.23–5.6)
SODIUM SERPL-SCNC: 137 MMOL/L (ref 136–145)
TRIGL SERPL-MCNC: 107 MG/DL (ref 0–150)
VLDLC SERPL CALC-MCNC: 21 MG/DL (ref 6–23)
WBC # BLD AUTO: 7.3 K/UL (ref 4.3–11.1)

## 2025-03-07 PROCEDURE — 3074F SYST BP LT 130 MM HG: CPT | Performed by: NURSE PRACTITIONER

## 2025-03-07 PROCEDURE — 1160F RVW MEDS BY RX/DR IN RCRD: CPT | Performed by: NURSE PRACTITIONER

## 2025-03-07 PROCEDURE — 3078F DIAST BP <80 MM HG: CPT | Performed by: NURSE PRACTITIONER

## 2025-03-07 PROCEDURE — 99204 OFFICE O/P NEW MOD 45 MIN: CPT | Performed by: NURSE PRACTITIONER

## 2025-03-07 PROCEDURE — 1123F ACP DISCUSS/DSCN MKR DOCD: CPT | Performed by: NURSE PRACTITIONER

## 2025-03-07 PROCEDURE — 1159F MED LIST DOCD IN RCRD: CPT | Performed by: NURSE PRACTITIONER

## 2025-03-07 RX ORDER — LOSARTAN POTASSIUM 25 MG/1
25 TABLET ORAL DAILY
Qty: 90 TABLET | Refills: 1 | Status: SHIPPED | OUTPATIENT
Start: 2025-03-07 | End: 2026-03-07

## 2025-03-07 RX ORDER — LOSARTAN POTASSIUM 25 MG/1
25 TABLET ORAL DAILY
COMMUNITY
Start: 2024-12-18 | End: 2025-03-07 | Stop reason: SDUPTHER

## 2025-03-07 RX ORDER — SIMVASTATIN 80 MG
80 TABLET ORAL NIGHTLY
Qty: 90 TABLET | Refills: 1 | Status: SHIPPED | OUTPATIENT
Start: 2025-03-07

## 2025-03-07 SDOH — ECONOMIC STABILITY: FOOD INSECURITY: WITHIN THE PAST 12 MONTHS, THE FOOD YOU BOUGHT JUST DIDN'T LAST AND YOU DIDN'T HAVE MONEY TO GET MORE.: NEVER TRUE

## 2025-03-07 SDOH — ECONOMIC STABILITY: FOOD INSECURITY: WITHIN THE PAST 12 MONTHS, YOU WORRIED THAT YOUR FOOD WOULD RUN OUT BEFORE YOU GOT MONEY TO BUY MORE.: NEVER TRUE

## 2025-03-07 ASSESSMENT — PATIENT HEALTH QUESTIONNAIRE - PHQ9: DEPRESSION UNABLE TO ASSESS: FUNCTIONAL CAPACITY MOTIVATION LIMITS ACCURACY

## 2025-03-07 ASSESSMENT — ENCOUNTER SYMPTOMS: CONSTIPATION: 1

## 2025-03-07 NOTE — ASSESSMENT & PLAN NOTE
Checking labs to see if controlled    Orders:    simvastatin (ZOCOR) 80 MG tablet; Take 1 tablet by mouth nightly    Comprehensive Metabolic Panel; Future    Lipid Panel; Future

## 2025-03-07 NOTE — ASSESSMENT & PLAN NOTE
Controlled on med    Orders:    FLUoxetine (PROZAC) 20 MG capsule; Take 1 capsule by mouth daily    CBC with Auto Differential; Future

## 2025-03-07 NOTE — ASSESSMENT & PLAN NOTE
No chest pain taking aspirin daily and on max statin dose    Orders:    simvastatin (ZOCOR) 80 MG tablet; Take 1 tablet by mouth nightly

## 2025-03-07 NOTE — PROGRESS NOTES
Kevin Clark (:  1940) is a 84 y.o. male,Established patient, here for evaluation of the following chief complaint(s):  New Patient (F3 New to the office here to establish care -moved in with his son a week ago), Orders (Pt's son said he would like to get a hospital bed, a Mary Anne chair, a walker that fits him, handicap placard for the car), Referral - General (Need a referral for a ), and Cholesterol Problem (Fu/med refills )         Assessment & Plan  Coronary artery disease involving coronary bypass graft of native heart without angina pectoris   No chest pain taking aspirin daily and on max statin dose    Orders:    simvastatin (ZOCOR) 80 MG tablet; Take 1 tablet by mouth nightly    Mixed hyperlipidemia   Checking labs to see if controlled    Orders:    simvastatin (ZOCOR) 80 MG tablet; Take 1 tablet by mouth nightly    Comprehensive Metabolic Panel; Future    Lipid Panel; Future    Major depressive disorder with single episode, in full remission   Controlled on med    Orders:    FLUoxetine (PROZAC) 20 MG capsule; Take 1 capsule by mouth daily    CBC with Auto Differential; Future    Prediabetes    Checking labs as none done in quite some time    Orders:    Hemoglobin A1C; Future    Primary hypertension   Chronic, at goal (stable), continue current treatment plan    Orders:    losartan (COZAAR) 25 MG tablet; Take 1 tablet by mouth daily for blood pressure    Typical atrial flutter (HCC)    Seems to be in sinus at present but hx of this in past  continue daily aspirin         Stage 3a chronic kidney disease (HCC)    Checking labs to see if improved with adequate hydration         Debility    Will get home health to assess for PT needs management of  medications and assistance with adl    Orders:    [facility] by Anatoly Haywood      Return in about 3 months (around 2025) for Recheck & 6 mo fu/fasting labs/med refills around 2025.       Subjective   HPI Here with

## 2025-03-10 ENCOUNTER — RESULTS FOLLOW-UP (OUTPATIENT)
Dept: FAMILY MEDICINE CLINIC | Facility: CLINIC | Age: 85
End: 2025-03-10

## 2025-03-10 DIAGNOSIS — N18.4 CKD (CHRONIC KIDNEY DISEASE) STAGE 4, GFR 15-29 ML/MIN (HCC): Primary | ICD-10-CM

## 2025-03-10 NOTE — TELEPHONE ENCOUNTER
----- Message from ANNE MARIE Ngo NP sent at 3/10/2025  7:54 AM EDT -----  Dm well controlled with Ha1c of 6.2 Still has issues with poor kidney function. Push fluids and avoid use of any aleve advil ibuprofen or motrin use just tylenol for any pain. Will ask HH to recheck BMP in 2 weeks Let me kno what agency has contacted  you to start care

## 2025-03-13 ENCOUNTER — TELEPHONE (OUTPATIENT)
Dept: FAMILY MEDICINE CLINIC | Facility: CLINIC | Age: 85
End: 2025-03-13

## 2025-03-13 NOTE — TELEPHONE ENCOUNTER
Jose Martin called from [facility] requesting verbal orders for Pt twice a week for 2 wk's and once a week for 6 wks. He said he will need orders for a rolling walker with the 2 wheels in the front and a hospital bed. He said you can send it to one of the DME companies of your choice.

## 2025-03-13 NOTE — TELEPHONE ENCOUNTER
I called the patients son cell  phone and left a message to call us back to let us know what DME company we need to send the orders for the walker/hospital bed.

## 2025-04-03 ENCOUNTER — HOSPITAL ENCOUNTER (EMERGENCY)
Age: 85
Discharge: HOME | End: 2025-04-03
Attending: EMERGENCY MEDICINE
Payer: MEDICARE

## 2025-04-03 VITALS
DIASTOLIC BLOOD PRESSURE: 56 MMHG | OXYGEN SATURATION: 100 % | BODY MASS INDEX: 19.33 KG/M2 | TEMPERATURE: 98.2 F | WEIGHT: 135 LBS | HEART RATE: 60 BPM | RESPIRATION RATE: 17 BRPM | HEIGHT: 70 IN | SYSTOLIC BLOOD PRESSURE: 143 MMHG

## 2025-04-03 DIAGNOSIS — N99.528 COMPLICATION OF ILEAL CONDUIT: Primary | ICD-10-CM

## 2025-04-03 PROCEDURE — 99283 EMERGENCY DEPT VISIT LOW MDM: CPT

## 2025-04-03 ASSESSMENT — PAIN - FUNCTIONAL ASSESSMENT
PAIN_FUNCTIONAL_ASSESSMENT: NONE - DENIES PAIN
PAIN_FUNCTIONAL_ASSESSMENT: NONE - DENIES PAIN

## 2025-04-04 NOTE — ED NOTES
Ostomy bulging out of abdomen, leaking urine. RN covered suprapubic catheter with 10x10 gauze and an abdominal pad.      Desiree Sherman, RN  04/03/25 2216

## 2025-04-04 NOTE — ED PROVIDER NOTES
Emergency Department Provider Note       PCP: Isabel Hernandez APRN - NP   Age: 84 y.o.   Sex: male     DISPOSITION Decision To Discharge 04/03/2025 11:06:02 PM    ICD-10-CM    1. Complication of Ileal conduit  N99.528           Medical Decision Making     Patient's presenting for evaluation of an issue with the suprapubic catheter.  On exam it appears to have prolapsed out.  I have attempted to manually reduce it without success.  I am going to reach out to the urology service.  ED Course as of 04/04/25 0120   Thu Apr 03, 2025 2214 I discussed the case with urology.  Patient actually has an ileal conduit with a stent after cystectomy.  I was instructed to apply a colostomy bag over top.  No further reduction attempts.  No wet dressing over top.  He should follow-up with his urologist. [ELIZABETH]      ED Course User Index  [ELIZABETH] Savanna Joseph, DO     1 or more acute illnesses that pose a threat to life or bodily function.   Discussion with external consultants.  Shared medical decision making was utilized in creating the patients health plan today.  I independently ordered and reviewed each unique test.               The management of this patient was discussed with an external consultant.            History     Patient is an 84-year-old male with past medical history of dementia.  He has a suprapubic catheter that apparently started leaking and burning earlier today.  When he looked down it looked like his intestines or bladder was coming out.  Denies any direct trauma or injury.  He does not believe that he caught his tubing on anything.        Physical Exam     Vitals signs and nursing note reviewed:  Vitals:    04/03/25 2100 04/03/25 2115 04/03/25 2300 04/03/25 2306   BP: (!) 146/76 (!) 156/76 (!) 143/66 (!) 143/56   Pulse: 59 61 60 60   Resp: 18 18 17 17   Temp:   98.2 °F (36.8 °C) 98.2 °F (36.8 °C)   TempSrc:    Oral   SpO2: 100% 100% 100% 100%   Weight:       Height:          Physical Exam  Vitals

## 2025-04-04 NOTE — ED NOTES
RN contacted daughter to verify patients address. RN also explained plan of care and the importance of her father following up with urology. Daughter verbalized understanding.      Desiree Sherman RN  04/03/25 7612

## 2025-04-04 NOTE — DISCHARGE INSTRUCTIONS
We spoke with urology.  We were instructed to place a colostomy bag over the area and instructed to follow-up with your urologist in the office.  Please call them tomorrow.

## 2025-04-21 ENCOUNTER — HOSPITAL ENCOUNTER (INPATIENT)
Age: 85
LOS: 5 days | Discharge: HOME HEALTH | End: 2025-04-26
Attending: FAMILY MEDICINE | Admitting: FAMILY MEDICINE
Payer: MEDICARE

## 2025-04-21 DIAGNOSIS — I82.90 THROMBOSIS: Primary | ICD-10-CM

## 2025-04-21 DIAGNOSIS — Z98.890 HISTORY OF UROSTOMY: ICD-10-CM

## 2025-04-21 PROBLEM — Z51.5 PALLIATIVE CARE ENCOUNTER: Status: ACTIVE | Noted: 2025-04-21

## 2025-04-21 PROBLEM — Z66 DNR (DO NOT RESUSCITATE): Status: ACTIVE | Noted: 2025-04-21

## 2025-04-21 PROBLEM — F03.90 DEMENTIA (HCC): Status: ACTIVE | Noted: 2025-04-21

## 2025-04-21 PROBLEM — I82.402 ACUTE DEEP VEIN THROMBOSIS (DVT) OF LEFT LOWER EXTREMITY (HCC): Status: ACTIVE | Noted: 2025-04-21

## 2025-04-21 PROBLEM — Z85.51 HISTORY OF BLADDER CANCER: Status: ACTIVE | Noted: 2025-04-21

## 2025-04-21 PROBLEM — N39.0 ACUTE UTI: Status: ACTIVE | Noted: 2025-04-21

## 2025-04-21 PROBLEM — N99.528: Status: ACTIVE | Noted: 2025-04-21

## 2025-04-21 LAB — LACTATE SERPL-SCNC: 1.2 MMOL/L (ref 0.5–2)

## 2025-04-21 PROCEDURE — 93005 ELECTROCARDIOGRAM TRACING: CPT | Performed by: FAMILY MEDICINE

## 2025-04-21 PROCEDURE — 36415 COLL VENOUS BLD VENIPUNCTURE: CPT

## 2025-04-21 PROCEDURE — 6360000002 HC RX W HCPCS: Performed by: FAMILY MEDICINE

## 2025-04-21 PROCEDURE — 1100000000 HC RM PRIVATE

## 2025-04-21 PROCEDURE — 87040 BLOOD CULTURE FOR BACTERIA: CPT

## 2025-04-21 PROCEDURE — 2500000003 HC RX 250 WO HCPCS: Performed by: FAMILY MEDICINE

## 2025-04-21 PROCEDURE — 83605 ASSAY OF LACTIC ACID: CPT

## 2025-04-21 PROCEDURE — 2580000003 HC RX 258: Performed by: FAMILY MEDICINE

## 2025-04-21 RX ORDER — POTASSIUM CHLORIDE 1500 MG/1
40 TABLET, EXTENDED RELEASE ORAL PRN
Status: DISCONTINUED | OUTPATIENT
Start: 2025-04-21 | End: 2025-04-26 | Stop reason: HOSPADM

## 2025-04-21 RX ORDER — SODIUM CHLORIDE 0.9 % (FLUSH) 0.9 %
5-40 SYRINGE (ML) INJECTION EVERY 12 HOURS SCHEDULED
Status: DISCONTINUED | OUTPATIENT
Start: 2025-04-21 | End: 2025-04-26 | Stop reason: HOSPADM

## 2025-04-21 RX ORDER — SODIUM CHLORIDE 9 MG/ML
INJECTION, SOLUTION INTRAVENOUS PRN
Status: DISCONTINUED | OUTPATIENT
Start: 2025-04-21 | End: 2025-04-26 | Stop reason: HOSPADM

## 2025-04-21 RX ORDER — ONDANSETRON 4 MG/1
4 TABLET, ORALLY DISINTEGRATING ORAL EVERY 8 HOURS PRN
Status: DISCONTINUED | OUTPATIENT
Start: 2025-04-21 | End: 2025-04-26 | Stop reason: HOSPADM

## 2025-04-21 RX ORDER — ASPIRIN 81 MG/1
81 TABLET ORAL DAILY
Status: DISCONTINUED | OUTPATIENT
Start: 2025-04-22 | End: 2025-04-26 | Stop reason: HOSPADM

## 2025-04-21 RX ORDER — SODIUM CHLORIDE 0.9 % (FLUSH) 0.9 %
5-40 SYRINGE (ML) INJECTION PRN
Status: DISCONTINUED | OUTPATIENT
Start: 2025-04-21 | End: 2025-04-26 | Stop reason: HOSPADM

## 2025-04-21 RX ORDER — MAGNESIUM SULFATE IN WATER 40 MG/ML
2000 INJECTION, SOLUTION INTRAVENOUS PRN
Status: DISCONTINUED | OUTPATIENT
Start: 2025-04-21 | End: 2025-04-26 | Stop reason: HOSPADM

## 2025-04-21 RX ORDER — POLYETHYLENE GLYCOL 3350 17 G/17G
17 POWDER, FOR SOLUTION ORAL DAILY PRN
Status: DISCONTINUED | OUTPATIENT
Start: 2025-04-21 | End: 2025-04-26 | Stop reason: HOSPADM

## 2025-04-21 RX ORDER — LOSARTAN POTASSIUM 25 MG/1
25 TABLET ORAL DAILY
Status: DISCONTINUED | OUTPATIENT
Start: 2025-04-22 | End: 2025-04-26 | Stop reason: HOSPADM

## 2025-04-21 RX ORDER — SODIUM CHLORIDE 9 MG/ML
INJECTION, SOLUTION INTRAVENOUS CONTINUOUS
Status: ACTIVE | OUTPATIENT
Start: 2025-04-21 | End: 2025-04-22

## 2025-04-21 RX ORDER — POTASSIUM CHLORIDE 7.45 MG/ML
10 INJECTION INTRAVENOUS PRN
Status: DISCONTINUED | OUTPATIENT
Start: 2025-04-21 | End: 2025-04-26 | Stop reason: HOSPADM

## 2025-04-21 RX ORDER — ACETAMINOPHEN 325 MG/1
650 TABLET ORAL EVERY 6 HOURS PRN
Status: DISCONTINUED | OUTPATIENT
Start: 2025-04-21 | End: 2025-04-26 | Stop reason: HOSPADM

## 2025-04-21 RX ORDER — ACETAMINOPHEN 650 MG/1
650 SUPPOSITORY RECTAL EVERY 6 HOURS PRN
Status: DISCONTINUED | OUTPATIENT
Start: 2025-04-21 | End: 2025-04-26 | Stop reason: HOSPADM

## 2025-04-21 RX ORDER — ENOXAPARIN SODIUM 100 MG/ML
40 INJECTION SUBCUTANEOUS DAILY
Status: DISCONTINUED | OUTPATIENT
Start: 2025-04-22 | End: 2025-04-26 | Stop reason: HOSPADM

## 2025-04-21 RX ORDER — ONDANSETRON 2 MG/ML
4 INJECTION INTRAMUSCULAR; INTRAVENOUS EVERY 6 HOURS PRN
Status: DISCONTINUED | OUTPATIENT
Start: 2025-04-21 | End: 2025-04-26 | Stop reason: HOSPADM

## 2025-04-21 RX ORDER — ATORVASTATIN CALCIUM 40 MG/1
40 TABLET, FILM COATED ORAL DAILY
Status: DISCONTINUED | OUTPATIENT
Start: 2025-04-22 | End: 2025-04-26 | Stop reason: HOSPADM

## 2025-04-21 RX ADMIN — WATER 1000 MG: 1 INJECTION INTRAMUSCULAR; INTRAVENOUS; SUBCUTANEOUS at 23:33

## 2025-04-21 RX ADMIN — SODIUM CHLORIDE, PRESERVATIVE FREE 10 ML: 5 INJECTION INTRAVENOUS at 22:25

## 2025-04-21 RX ADMIN — SODIUM CHLORIDE: 0.9 INJECTION, SOLUTION INTRAVENOUS at 22:32

## 2025-04-22 ENCOUNTER — APPOINTMENT (OUTPATIENT)
Dept: ULTRASOUND IMAGING | Age: 85
End: 2025-04-22
Attending: FAMILY MEDICINE
Payer: MEDICARE

## 2025-04-22 LAB
ALBUMIN SERPL-MCNC: 2.9 G/DL (ref 3.2–4.6)
ALBUMIN/GLOB SERPL: 0.8 (ref 1–1.9)
ALP SERPL-CCNC: 54 U/L (ref 40–129)
ALT SERPL-CCNC: 10 U/L (ref 8–55)
ANION GAP SERPL CALC-SCNC: 9 MMOL/L (ref 7–16)
AST SERPL-CCNC: 22 U/L (ref 15–37)
BILIRUB SERPL-MCNC: 0.4 MG/DL (ref 0–1.2)
BUN SERPL-MCNC: 23 MG/DL (ref 8–23)
CALCIUM SERPL-MCNC: 8.7 MG/DL (ref 8.8–10.2)
CHLORIDE SERPL-SCNC: 109 MMOL/L (ref 98–107)
CO2 SERPL-SCNC: 23 MMOL/L (ref 20–29)
CREAT SERPL-MCNC: 1.47 MG/DL (ref 0.8–1.3)
EKG ATRIAL RATE: 54 BPM
EKG DIAGNOSIS: NORMAL
EKG P AXIS: 92 DEGREES
EKG P-R INTERVAL: 90 MS
EKG Q-T INTERVAL: 464 MS
EKG QRS DURATION: 96 MS
EKG QTC CALCULATION (BAZETT): 440 MS
EKG R AXIS: 44 DEGREES
EKG T AXIS: 89 DEGREES
EKG VENTRICULAR RATE: 54 BPM
ERYTHROCYTE [DISTWIDTH] IN BLOOD BY AUTOMATED COUNT: 15.2 % (ref 11.9–14.6)
GLOBULIN SER CALC-MCNC: 3.7 G/DL (ref 2.3–3.5)
GLUCOSE BLD STRIP.AUTO-MCNC: 122 MG/DL (ref 65–100)
GLUCOSE BLD STRIP.AUTO-MCNC: 165 MG/DL (ref 65–100)
GLUCOSE BLD STRIP.AUTO-MCNC: 89 MG/DL (ref 65–100)
GLUCOSE BLD STRIP.AUTO-MCNC: 90 MG/DL (ref 65–100)
GLUCOSE SERPL-MCNC: 95 MG/DL (ref 70–99)
HCT VFR BLD AUTO: 32.6 % (ref 41.1–50.3)
HGB BLD-MCNC: 11.2 G/DL (ref 13.6–17.2)
MCH RBC QN AUTO: 30.4 PG (ref 26.1–32.9)
MCHC RBC AUTO-ENTMCNC: 34.4 G/DL (ref 31.4–35)
MCV RBC AUTO: 88.6 FL (ref 82–102)
NRBC # BLD: 0 K/UL (ref 0–0.2)
PLATELET # BLD AUTO: 132 K/UL (ref 150–450)
PMV BLD AUTO: 10 FL (ref 9.4–12.3)
POTASSIUM SERPL-SCNC: 4 MMOL/L (ref 3.5–5.1)
PROT SERPL-MCNC: 6.6 G/DL (ref 6.3–8.2)
RBC # BLD AUTO: 3.68 M/UL (ref 4.23–5.6)
SERVICE CMNT-IMP: ABNORMAL
SERVICE CMNT-IMP: ABNORMAL
SERVICE CMNT-IMP: NORMAL
SERVICE CMNT-IMP: NORMAL
SODIUM SERPL-SCNC: 142 MMOL/L (ref 136–145)
WBC # BLD AUTO: 4.7 K/UL (ref 4.3–11.1)

## 2025-04-22 PROCEDURE — 6370000000 HC RX 637 (ALT 250 FOR IP): Performed by: FAMILY MEDICINE

## 2025-04-22 PROCEDURE — 1100000000 HC RM PRIVATE

## 2025-04-22 PROCEDURE — 99222 1ST HOSP IP/OBS MODERATE 55: CPT | Performed by: NURSE PRACTITIONER

## 2025-04-22 PROCEDURE — 85027 COMPLETE CBC AUTOMATED: CPT

## 2025-04-22 PROCEDURE — 93970 EXTREMITY STUDY: CPT

## 2025-04-22 PROCEDURE — 93970 EXTREMITY STUDY: CPT | Performed by: RADIOLOGY

## 2025-04-22 PROCEDURE — 36415 COLL VENOUS BLD VENIPUNCTURE: CPT

## 2025-04-22 PROCEDURE — 6360000002 HC RX W HCPCS: Performed by: FAMILY MEDICINE

## 2025-04-22 PROCEDURE — 2500000003 HC RX 250 WO HCPCS: Performed by: FAMILY MEDICINE

## 2025-04-22 PROCEDURE — 93010 ELECTROCARDIOGRAM REPORT: CPT | Performed by: INTERNAL MEDICINE

## 2025-04-22 PROCEDURE — 80053 COMPREHEN METABOLIC PANEL: CPT

## 2025-04-22 PROCEDURE — 82962 GLUCOSE BLOOD TEST: CPT

## 2025-04-22 RX ADMIN — FLUOXETINE HYDROCHLORIDE 20 MG: 20 CAPSULE ORAL at 07:36

## 2025-04-22 RX ADMIN — LOSARTAN POTASSIUM 25 MG: 25 TABLET, FILM COATED ORAL at 07:36

## 2025-04-22 RX ADMIN — ATORVASTATIN CALCIUM 40 MG: 40 TABLET, FILM COATED ORAL at 07:36

## 2025-04-22 RX ADMIN — SODIUM CHLORIDE, PRESERVATIVE FREE 10 ML: 5 INJECTION INTRAVENOUS at 21:20

## 2025-04-22 RX ADMIN — ENOXAPARIN SODIUM 40 MG: 100 INJECTION SUBCUTANEOUS at 07:36

## 2025-04-22 RX ADMIN — ASPIRIN 81 MG: 81 TABLET ORAL at 07:36

## 2025-04-22 NOTE — H&P
Hospitalist History and Physical   Admit Date:  2025  6:59 PM   Name:  Kevin Clark   Age:  84 y.o.  Sex:  male  :  1940   MRN:  [id number]   Room:      Presenting/Chief Complaint: No chief complaint on file.     Reason(s) for Admission: Acute urinary tract infection [N39.0]  Acute UTI [N39.0]     History of Present Illness:   Kevin Clark is a 84 y.o. male with medical history of bladder cancer s/p cystoprostatectomy with ileal conduit 2008, a flutter not on OAC d/t bleeding through urostomy, CAD s/p CABG, CKD 3, dementia who presented with abdominal pain and urine leaking around his urostomy site of several day duration. Pt has a history of dementia therefore history difficult to obtain. Per pt's son Pierce whom patient lives with, pt started to complain of RLQ abdominal pain on  and noticed more urine leaking around his urostomy site. Also his parastomal hernia appears to be larger. No fever or chills. Pt presented to [facility] ED and was found to have a UTI with hydronephrosis on CTAP, therefore pt was requested to be transferred to [facility] as he follows [facility].     Of note, pt has a history of radical cystoprostatectomy with ileal conduit 2008. Last saw Urology in 2022 but has an appointment to re-establish care in May 2025. He follows  for ureteral stent exchange, last encounter with 2023.  He has a history of parastomal hernia repair in 2011 and again 3/2013.    In ED, hemoglobin 11.8, platelets 132 BUN 29, creatinine 1.58 glucose 102.  UA is consistent with UTI with positive nitrate, large leukocyte esterase, few bacteria.  CTAP was done and shows postoperative changes status post cystectomy and ileal conduit formation; moderate left hydronephrosis and additional findings concerning for ascending urinary tract infection; left nephroureteral stent appears appropriately positioned; mild right hydronephrosis with dilation of renal pelvis and  anticoagulation.  Also did not want to undergo ablation.  Continue ASA and statin  Follow-up with cardiology outpatient     CKD 3  Creatinine at baseline 1.5-1.7, was 1.58 on admission  Avoid nephrotoxic meds  CTM BMP    HTN  Continue home losartan     Anxiety/depression  Continue home Prozac    Dementia  Adds to complexity of care  Limit sedating meds  Delirium precautions    PT/OT evals ordered?  Therapy evals ordered  Diet: ADULT DIET; Easy to Chew  VTE prophylaxis: Lovenox  Code status: DNR  Code status discussed: Yes  Blood consent obtained: N/A      Non-peripheral Lines and Tubes (if present):             Hospital Problems:  Principal Problem:    Acute UTI  Active Problems:    Typical atrial flutter (HCC)    History of coronary artery bypass graft    Stage 3a chronic kidney disease (HCC)    Mixed hyperlipidemia    Coronary artery disease involving coronary bypass graft of native heart without angina pectoris    Major depressive disorder with single episode, in full remission    Essential (primary) hypertension    DNR (do not resuscitate)    Palliative care encounter    History of bladder cancer    Dementia (HCC)  Resolved Problems:    * No resolved hospital problems. *        Objective:   No data found.         Estimated body mass index is 19.9 kg/m² as calculated from the following:    Height as of this encounter: 1.778 m (5' 10\").    Weight as of this encounter: 62.9 kg (138 lb 10.7 oz).  No intake or output data in the 24 hours ending 04/21/25 1533      Physical Exam:    General:    Chronically ill appearing. Pleasantly demented. Hard of hearing  Head:  Normocephalic, atraumatic  Eyes:  Sclerae appear normal.  Pupils equally round.  ENT:  Nares appear normal.  Moist oral mucosa  Neck:  No restricted ROM.  Trachea midline   CV:   RRR.  No m/r/g.  No jugular venous distension.  Lungs:   CTAB.  No wheezing, rhonchi, or rales.  Symmetric expansion.  Abdomen:   Soft, nontender, nondistended. Urostomy bag on RLQ

## 2025-04-22 NOTE — PROGRESS NOTES
4 Eyes Skin Assessment     NAME:  Kevin Clark  YOB: 1940  MEDICAL RECORD NUMBER:  [id number]    The patient is being assessed for  Admission    I agree that at least one RN has performed a thorough Head to Toe Skin Assessment on the patient. ALL assessment sites listed below have been assessed.      Areas assessed by both nurses:    Other ***        Does the Patient have a Wound? No noted wound(s)       Michael Prevention initiated by RN: Yes  Wound Care Orders initiated by RN: No    Pressure Injury (Stage 3,4, Unstageable, DTI, NWPT, and Complex wounds) if present, place Wound referral order by RN under : No    New Ostomies, if present place, Ostomy referral order under : Yes     Nurse 1 eSignature: Electronically signed by Parag Del Castillo RN on 4/22/25 at 3:26 AM EDT    **SHARE this note so that the co-signing nurse can place an eSignature**    Nurse 2 eSignature: {Esignature:[id number]}

## 2025-04-22 NOTE — PROGRESS NOTES
Spiritual Health History and Assessment/Progress Note  [facility]      Room # 215/01    Name: Kevin Clark           Age: 84 y.o.    Gender: male          MRN: [id number]  Latter day: Jainism       Preferred Language: English      Date: 04/22/25  Visit Time: Begin Time: (P) 1040 End Time : (P) 1100 Complexity: Complexity of Encounter: (P) Low      Visit Summary:  met with patient through regular rounds.   No visitors.  Patient expressed that they were Confucianism and regularly read their Bible as a spiritual resource.  Patient's social support includes his children.   provided active listening, discussion of illness, space for expression of feelings and spiritual support.   will follow up as necessary or at patient's request.       Referral/Consult From: Referral/Consult From: (P) Rounding  Encounter Overview/Reason: Encounter Overview/Reason: (P) Spiritual/Emotional Needs  Encounter Code:     Crisis (if applicable):    Service Provided For: Service Provided For: (P) Patient     Patient was available.    Kamini, Belief, Meaning:   Patient identifies as spiritual and is connected with a kamini tradition or spiritual practice  Family/Friends   Rituals      Importance and Influence:  Patient does not have spiritual/personal beliefs that influence decisions regarding their health  Family/Friends     Community:  Patient   Support system includes  and (P) Children   Family/Friends       Assessment and Plan of Care:   Emotions Expressed by Patient:   Assessment: (P) Calm    Interventions by :   Intervention: (P) Active listening, Sustaining Presence/Ministry of presence, Explored/Affirmed feelings, thoughts, concerns, Discussed belief system/Jehovah's witness practices/kamini, Explored Coping Skills/Resources     Result/ Response by Patient:   Outcome: (P) Acceptance, Comfort, Engaged in conversation    Patient Plan of Care:   Plan and Referrals  Plan/Referrals: (P) Continue Support

## 2025-04-22 NOTE — PROGRESS NOTES
Hospitalist Progress Note   Admit Date:  2025  6:59 PM   Name:  Kevin Clark   Age:  84 y.o.  Sex:  male  :  1940   MRN:  [id number]   Room:      Presenting/Chief Complaint: No chief complaint on file.     Reason(s) for Admission: Acute urinary tract infection [N39.0]  Acute UTI [N39.0]     Hospital Course:   Kevin Clark is a 84 y.o. male with medical history of bladder cancer s/p cystoprostatectomy with ileal conduit 2008, a flutter not on OAC d/t bleeding through urostomy, CAD s/p CABG, CKD 3, dementia who presented with abdominal pain and urine leaking around his urostomy site of several day duration.  Per chart review patient lives with his son, Jamie.  Patient was having right lower quadrant abdominal pain that started on  and there was noted to be urine leaking around ostomy site which prompted patient's son to bring him to [facility] ED and patient was subsequently transferred to [facility] [place].  Of note patient follows outpatient with [facility].  Patient also follows with IR for ureteral stent exchange.     On arrival to the ED, notable labs include hemoglobin 11.8, BUN 29, creatinine 1.58.  UA positive for nitrites and leukocyte esterase as well as few bacteria.  CT abdomen pelvis showed postoperative changes and cystectomy with ileal conduit formation.  Moderate left hydronephrosis also seen with findings concerning for ascending urinary tract infection, mild right hydronephrosis with dilation of renal pelvis and ureter without associated inflammatory changes.  Left common femoral vein thrombosis also seen and recommend correlation with ultrasound.  Urology consulted.  IR consulted for stent exchange planned for .  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  Patient did not tolerate anticoagulation in the past and is scheduled for IR procedure tomorrow, therefore will

## 2025-04-22 NOTE — CARE COORDINATION
Pt chart reviewed for discharge planning. MSW met with pt at bedside (confused), verified demographic information/ health insurance with son (Jamie Clark). Pt lives with son and family in one level home, states needs little assistance with ADLs, uses a walker/ cane to ambulate. Pt does not drive in the community . PCP was confirmed. Pt son reports [facility] in home for RN, PT twice a week. MSW will follow pt plan of care and assist with supportive care referrals pending pt clinical progress.  Please consult case management if specific needs arise.        04/22/25 1224   Service Assessment   Patient Orientation Alert and Oriented   Cognition Alert   History Provided By Child/Family  (Jamie Eduardo)   Primary Caregiver Self   Accompanied By/Relationship Son   Support Systems Children;Family Members   Patient's Healthcare Decision Maker is: Named in Scanned ACP Document   PCP Verified by CM Yes   Last Visit to PCP Within last 3 months   Prior Functional Level Independent in ADLs/IADLs   Current Functional Level Independent in ADLs/IADLs   Can patient return to prior living arrangement Yes   Ability to make needs known: Good   Family able to assist with home care needs: Yes   Would you like for me to discuss the discharge plan with any other family members/significant others, and if so, who? Yes  (Son)   Financial Resources Medicare  ([employer])   Community Resources None   Social/Functional History   Lives With Son   Type of Home House   Home Layout One level   Home Equipment Cane;Walker - Rolling   Receives Help From Family   Prior Level of Assist for ADLs Needs assistance   Ambulation Assistance Needs assistance   Active  No   Patient's  Info Son   Occupation Retired   Discharge Planning   Type of Residence House   Living Arrangements Children   Current Services Prior To Admission Home Care  ([facility])   Potential Assistance Needed N/A   DME Ordered? No   Potential Assistance Purchasing Medications No

## 2025-04-23 ENCOUNTER — APPOINTMENT (OUTPATIENT)
Dept: INTERVENTIONAL RADIOLOGY/VASCULAR | Age: 85
End: 2025-04-23
Attending: FAMILY MEDICINE
Payer: MEDICARE

## 2025-04-23 LAB
ALBUMIN SERPL-MCNC: 2.9 G/DL (ref 3.2–4.6)
ALBUMIN/GLOB SERPL: 0.9 (ref 1–1.9)
ALP SERPL-CCNC: 51 U/L (ref 40–129)
ALT SERPL-CCNC: 12 U/L (ref 8–55)
ANION GAP SERPL CALC-SCNC: 9 MMOL/L (ref 7–16)
AST SERPL-CCNC: 27 U/L (ref 15–37)
BILIRUB SERPL-MCNC: 0.3 MG/DL (ref 0–1.2)
BUN SERPL-MCNC: 18 MG/DL (ref 8–23)
CALCIUM SERPL-MCNC: 8.5 MG/DL (ref 8.8–10.2)
CHLORIDE SERPL-SCNC: 110 MMOL/L (ref 98–107)
CO2 SERPL-SCNC: 24 MMOL/L (ref 20–29)
CREAT SERPL-MCNC: 1.36 MG/DL (ref 0.8–1.3)
ERYTHROCYTE [DISTWIDTH] IN BLOOD BY AUTOMATED COUNT: 15.2 % (ref 11.9–14.6)
GLOBULIN SER CALC-MCNC: 3.2 G/DL (ref 2.3–3.5)
GLUCOSE BLD STRIP.AUTO-MCNC: 92 MG/DL (ref 65–100)
GLUCOSE BLD STRIP.AUTO-MCNC: 97 MG/DL (ref 65–100)
GLUCOSE SERPL-MCNC: 95 MG/DL (ref 70–99)
HCT VFR BLD AUTO: 31.6 % (ref 41.1–50.3)
HGB BLD-MCNC: 10.8 G/DL (ref 13.6–17.2)
MAGNESIUM SERPL-MCNC: 2 MG/DL (ref 1.8–2.4)
MCH RBC QN AUTO: 30.3 PG (ref 26.1–32.9)
MCHC RBC AUTO-ENTMCNC: 34.2 G/DL (ref 31.4–35)
MCV RBC AUTO: 88.5 FL (ref 82–102)
NRBC # BLD: 0 K/UL (ref 0–0.2)
PLATELET # BLD AUTO: 134 K/UL (ref 150–450)
PMV BLD AUTO: 10.1 FL (ref 9.4–12.3)
POTASSIUM SERPL-SCNC: 3.4 MMOL/L (ref 3.5–5.1)
PROT SERPL-MCNC: 6.2 G/DL (ref 6.3–8.2)
RBC # BLD AUTO: 3.57 M/UL (ref 4.23–5.6)
SERVICE CMNT-IMP: NORMAL
SERVICE CMNT-IMP: NORMAL
SODIUM SERPL-SCNC: 143 MMOL/L (ref 136–145)
WBC # BLD AUTO: 5.4 K/UL (ref 4.3–11.1)

## 2025-04-23 PROCEDURE — 50385 CHANGE STENT VIA TRANSURETH: CPT | Performed by: RADIOLOGY

## 2025-04-23 PROCEDURE — 36415 COLL VENOUS BLD VENIPUNCTURE: CPT

## 2025-04-23 PROCEDURE — 99231 SBSQ HOSP IP/OBS SF/LOW 25: CPT | Performed by: PHYSICIAN ASSISTANT

## 2025-04-23 PROCEDURE — 2500000003 HC RX 250 WO HCPCS: Performed by: FAMILY MEDICINE

## 2025-04-23 PROCEDURE — 6360000002 HC RX W HCPCS: Performed by: RADIOLOGY

## 2025-04-23 PROCEDURE — 6370000000 HC RX 637 (ALT 250 FOR IP): Performed by: FAMILY MEDICINE

## 2025-04-23 PROCEDURE — 1100000000 HC RM PRIVATE

## 2025-04-23 PROCEDURE — 50688 CHANGE OF URETER TUBE/STENT: CPT

## 2025-04-23 PROCEDURE — 0T773DZ DILATION OF LEFT URETER WITH INTRALUMINAL DEVICE, PERCUTANEOUS APPROACH: ICD-10-PCS | Performed by: RADIOLOGY

## 2025-04-23 PROCEDURE — 6360000002 HC RX W HCPCS: Performed by: FAMILY MEDICINE

## 2025-04-23 PROCEDURE — 83735 ASSAY OF MAGNESIUM: CPT

## 2025-04-23 PROCEDURE — 6360000004 HC RX CONTRAST MEDICATION: Performed by: RADIOLOGY

## 2025-04-23 PROCEDURE — 80053 COMPREHEN METABOLIC PANEL: CPT

## 2025-04-23 PROCEDURE — 0TP93DZ REMOVAL OF INTRALUMINAL DEVICE FROM URETER, PERCUTANEOUS APPROACH: ICD-10-PCS | Performed by: RADIOLOGY

## 2025-04-23 PROCEDURE — 82962 GLUCOSE BLOOD TEST: CPT

## 2025-04-23 PROCEDURE — 85027 COMPLETE CBC AUTOMATED: CPT

## 2025-04-23 RX ORDER — FENTANYL CITRATE 50 UG/ML
INJECTION, SOLUTION INTRAMUSCULAR; INTRAVENOUS PRN
Status: DISCONTINUED | OUTPATIENT
Start: 2025-04-23 | End: 2025-04-26 | Stop reason: HOSPADM

## 2025-04-23 RX ADMIN — WATER 1000 MG: 1 INJECTION INTRAMUSCULAR; INTRAVENOUS; SUBCUTANEOUS at 00:18

## 2025-04-23 RX ADMIN — POTASSIUM CHLORIDE 40 MEQ: 1500 TABLET, EXTENDED RELEASE ORAL at 12:32

## 2025-04-23 RX ADMIN — IOHEXOL 10 ML: 300 INJECTION, SOLUTION INTRAVENOUS at 09:02

## 2025-04-23 RX ADMIN — FENTANYL CITRATE 50 MCG: 50 INJECTION, SOLUTION INTRAMUSCULAR; INTRAVENOUS at 08:56

## 2025-04-23 RX ADMIN — SODIUM CHLORIDE, PRESERVATIVE FREE 10 ML: 5 INJECTION INTRAVENOUS at 21:46

## 2025-04-23 RX ADMIN — ATORVASTATIN CALCIUM 40 MG: 40 TABLET, FILM COATED ORAL at 07:58

## 2025-04-23 RX ADMIN — FLUOXETINE HYDROCHLORIDE 20 MG: 20 CAPSULE ORAL at 07:58

## 2025-04-23 RX ADMIN — SODIUM CHLORIDE, PRESERVATIVE FREE 10 ML: 5 INJECTION INTRAVENOUS at 09:59

## 2025-04-23 RX ADMIN — WATER 1000 MG: 1 INJECTION INTRAMUSCULAR; INTRAVENOUS; SUBCUTANEOUS at 23:56

## 2025-04-23 RX ADMIN — LOSARTAN POTASSIUM 25 MG: 25 TABLET, FILM COATED ORAL at 07:58

## 2025-04-23 RX ADMIN — ASPIRIN 81 MG: 81 TABLET ORAL at 07:58

## 2025-04-23 NOTE — PROGRESS NOTES
Occupational Therapy Note:    Attempted to see patient this AM for occupational therapy evaluation session. Patient MILA at surgery. Will follow and re-attempt as schedule permits/patient available. Thank you,    Bindu Low, OT    Rehab Caseload Tracker

## 2025-04-23 NOTE — PROGRESS NOTES
4 Eyes Skin Assessment     NAME:  Kevin Clark  YOB: 1940  MEDICAL RECORD NUMBER:  [id number]    The patient is being assessed for  Admission    I agree that at least one RN has performed a thorough Head to Toe Skin Assessment on the patient. ALL assessment sites listed below have been assessed.      Areas assessed by both nurses:    Head, Face, Ears, Shoulders, Back, Chest, Arms, Elbows, Hands, Sacrum. Buttock, Coccyx, Ischium, and Legs. Feet and Heels        Does the Patient have a Wound? No noted wound(s)       Michael Prevention initiated by RN: Yes  Wound Care Orders initiated by RN: No    Pressure Injury (Stage 3,4, Unstageable, DTI, NWPT, and Complex wounds) if present, place Wound referral order by RN under : No    New Ostomies, if present place, Ostomy referral order under : Yes     Nurse 1 eSignature: Electronically signed by Parag Del Castillo RN on 4/23/25 at 2:18 AM EDT    **SHARE this note so that the co-signing nurse can place an eSignature**    Nurse 2 eSignature: Electronically signed by Carlin Gustafson RN on 4/23/25 at 2:26 AM EDT

## 2025-04-23 NOTE — PROGRESS NOTES
Hospitalist Progress Note   Admit Date:  2025  6:59 PM   Name:  Kevin Clark   Age:  84 y.o.  Sex:  male  :  1940   MRN:  [id number]   Room:      Presenting/Chief Complaint: No chief complaint on file.     Reason(s) for Admission: Acute urinary tract infection [N39.0]  Acute UTI [N39.0]     Hospital Course:   Kevin Clark is a 84 y.o. male with medical history of bladder cancer s/p cystoprostatectomy with ileal conduit 2008, a flutter not on OAC d/t bleeding through urostomy, CAD s/p CABG, CKD 3, dementia who presented with abdominal pain and urine leaking around his urostomy site of several day duration.  Per chart review patient lives with his son, Jamie.  Patient was having right lower quadrant abdominal pain that started on  and there was noted to be urine leaking around ostomy site which prompted patient's son to bring him to [facility] ED and patient was subsequently transferred to [facility] [place].  Of note patient follows outpatient with [facility].  Patient also follows with IR for ureteral stent exchange.     On arrival to the ED, notable labs include hemoglobin 11.8, BUN 29, creatinine 1.58.  UA positive for nitrites and leukocyte esterase as well as few bacteria.  CT abdomen pelvis showed postoperative changes and cystectomy with ileal conduit formation.  Moderate left hydronephrosis also seen with findings concerning for ascending urinary tract infection, mild right hydronephrosis with dilation of renal pelvis and ureter without associated inflammatory changes.  Left common femoral vein thrombosis also seen and recommend correlation with ultrasound.  Urology consulted.  IR consulted for stent exchange planned for .  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  Patient did not tolerate anticoagulation in the past. IR consult for evaluation for IVC filter. If not an IVC     0.9 % sodium chloride infusion   IntraVENous PRN    potassium chloride (KLOR-CON M) extended release tablet 40 mEq  40 mEq Oral PRN    Or    potassium bicarb-citric acid (EFFER-K) effervescent tablet 40 mEq  40 mEq Oral PRN    Or    potassium chloride 10 mEq/100 mL IVPB (Peripheral Line)  10 mEq IntraVENous PRN    magnesium sulfate 2000 mg in 50 mL IVPB premix  2,000 mg IntraVENous PRN    enoxaparin (LOVENOX) injection 40 mg  40 mg SubCUTAneous Daily    ondansetron (ZOFRAN-ODT) disintegrating tablet 4 mg  4 mg Oral Q8H PRN    Or    ondansetron (ZOFRAN) injection 4 mg  4 mg IntraVENous Q6H PRN    polyethylene glycol (GLYCOLAX) packet 17 g  17 g Oral Daily PRN    acetaminophen (TYLENOL) tablet 650 mg  650 mg Oral Q6H PRN    Or    acetaminophen (TYLENOL) suppository 650 mg  650 mg Rectal Q6H PRN    cefTRIAXone (ROCEPHIN) 1,000 mg in sterile water 10 mL IV syringe  1,000 mg IntraVENous Q24H       Signed:  Donte Terrell DO    Part of this note may have been written by using a voice dictation software.  The note has been proof read but may still contain some grammatical/other typographical errors.

## 2025-04-23 NOTE — PROGRESS NOTES
Physical Therapy Note:    Attempted to see patient this AM for physical therapy evaluation session. Patient MILA at surgery. Will follow and re-attempt as schedule permits/patient available. Thank you,    SALO GUILLAUME, PT     Rehab Caseload Tracker

## 2025-04-23 NOTE — BRIEF OP NOTE
[facility]  Department of Interventional Radiology  [phone]        Interventional Radiology Brief Procedure Note    Patient: Kevin Clark MRN: [id number]  SSN: [id number]    YOB: 1940  Age: 84 y.o.  Sex: male      Date of Procedure: 4/23/2025    Pre-Procedure Diagnosis: bladder CA    Post-Procedure Diagnosis: SAME    Procedure(s): ureteral stent exchange       Performed By: Jose Mitchell MD     Assistants: None    Anesthesia:None    Estimated Blood Loss: None    Specimens:  None    Implants:  Biliary Drain used as ureteral stent    Findings: no hydronephrosis    Complications: None    Recommendations: flush weekly     Follow Up: 3 months    Signed By: Jose Mitchell MD     April 23, 2025

## 2025-04-23 NOTE — PROGRESS NOTES
TRANSFER - OUT REPORT:    Verbal report given to Rosi GRAVES on Kevin Clark  being transferred to [facility] for routine progression of patient care       Report consisted of patient's Situation, Background, Assessment and   Recommendations(SBAR).     Information from the following report(s) Nurse Handoff Report, Intake/Output, and MAR was reviewed with the receiving nurse.           Lines:   Peripheral IV 04/21/25 Distal;Right;Anterior Cephalic (Active)   Site Assessment Clean, dry & intact 04/22/25 1935   Line Status Flushed;Infusing;No blood return 04/22/25 1935   Line Care Cap changed;Connections checked and tightened 04/22/25 1935   Phlebitis Assessment No symptoms 04/22/25 1935   Infiltration Assessment 0 04/22/25 1935   Alcohol Cap Used Yes 04/22/25 1935   Dressing Status Clean, dry & intact 04/22/25 1935   Dressing Type Transparent 04/22/25 1935        Opportunity for questions and clarification was provided.      Patient transported with:  Tech

## 2025-04-23 NOTE — PROGRESS NOTES
Admit Date: 4/21/2025    Subjective:     Patient has no new complaints.     Objective:     Patient Vitals for the past 8 hrs:   BP Temp Temp src Pulse Resp SpO2   04/23/25 1003 (!) 159/77 97.3 °F (36.3 °C) Oral 55 20 99 %   04/23/25 0904 (!) 163/89 -- -- 62 18 98 %   04/23/25 0900 (!) 147/81 -- -- 60 16 100 %   04/23/25 0856 (!) 148/78 -- -- 54 17 100 %   04/23/25 0852 (!) 158/82 -- -- 55 18 100 %   04/23/25 0848 (!) 151/78 -- -- 55 18 100 %   04/23/25 0832 (!) 149/79 -- -- -- -- 99 %   04/23/25 0814 (!) 156/77 97 °F (36.1 °C) Temporal 52 16 96 %   04/23/25 0740 132/80 97.7 °F (36.5 °C) Oral 54 17 97 %     04/23 0701 - 04/23 1900  In: -   Out: 100 [Urine:100]  04/21 1901 - 04/23 0700  In: -   Out: 1070 [Urine:1070]    Physical Exam:  GENERAL ASSESSMENT: alert, oriented to person, place and time, no acute distress and no anxiety, depression or agitation  Chest: Easy work of breathing  CVS exam: RRR  ABDOMEN: stoma pink with stent extending out, urine clear, blood tinged  Neurological exam reveals alert, oriented, normal speech, no focal findings or movement disorder noted.  FEMALE GENITOURINARY EXAM: not done  MALE GENITAL EXAM: not done        Data Review   Recent Results (from the past 24 hours)   POCT Glucose    Collection Time: 04/22/25  4:37 PM   Result Value Ref Range    POC Glucose 122 (H) 65 - 100 mg/dL    Performed by: Avani    POCT Glucose    Collection Time: 04/22/25  7:37 PM   Result Value Ref Range    POC Glucose 165 (H) 65 - 100 mg/dL    Performed by: Maximiliano    CBC    Collection Time: 04/23/25  5:01 AM   Result Value Ref Range    WBC 5.4 4.3 - 11.1 K/uL    RBC 3.57 (L) 4.23 - 5.6 M/uL    Hemoglobin 10.8 (L) 13.6 - 17.2 g/dL    Hematocrit 31.6 (L) 41.1 - 50.3 %    MCV 88.5 82 - 102 FL    MCH 30.3 26.1 - 32.9 PG    MCHC 34.2 31.4 - 35.0 g/dL    RDW 15.2 (H) 11.9 - 14.6 %    Platelets 134 (L) 150 - 450 K/uL    MPV 10.1 9.4 - 12.3 FL    nRBC 0.00 0.0 - 0.2 K/uL   Comprehensive  chronic kidney disease (HCC)    Mixed hyperlipidemia    Coronary artery disease involving coronary bypass graft of native heart without angina pectoris    Major depressive disorder with single episode, in full remission    Essential (primary) hypertension    DNR (do not resuscitate)    Palliative care encounter    History of bladder cancer    Dementia (HCC)    History of urostomy    Complication of Ileal conduit    Acute deep vein thrombosis (DVT) of left lower extremity  Resolved Problems:    * No resolved hospital problems. *    S/P nephU stent exchange with IR this morning. Cr 1.36. WBC 5.4. Hgb 10.8.  Plan:   Signing off. F/u as scheduled with Dr Lincoln.  F/u with IR for next stent exchange. Reinforced importance of regularly exchanging stent with pt.         TRISTAN Delgado  [facility]    Phone: [phone]

## 2025-04-23 NOTE — CARE COORDINATION
PT/OT evals pending, pt was current with [facility] for home PT/OT/RN prior to this admission. CM will follow for discharge recommendations and send [facility] orders to [facility] if needed.

## 2025-04-24 LAB
ALBUMIN SERPL-MCNC: 2.8 G/DL (ref 3.2–4.6)
ALBUMIN/GLOB SERPL: 0.9 (ref 1–1.9)
ALP SERPL-CCNC: 48 U/L (ref 40–129)
ALT SERPL-CCNC: 13 U/L (ref 8–55)
ANION GAP SERPL CALC-SCNC: 9 MMOL/L (ref 7–16)
AST SERPL-CCNC: 28 U/L (ref 15–37)
BILIRUB SERPL-MCNC: 0.3 MG/DL (ref 0–1.2)
BUN SERPL-MCNC: 13 MG/DL (ref 8–23)
CALCIUM SERPL-MCNC: 8.3 MG/DL (ref 8.8–10.2)
CHLORIDE SERPL-SCNC: 109 MMOL/L (ref 98–107)
CO2 SERPL-SCNC: 22 MMOL/L (ref 20–29)
CREAT SERPL-MCNC: 1.28 MG/DL (ref 0.8–1.3)
ERYTHROCYTE [DISTWIDTH] IN BLOOD BY AUTOMATED COUNT: 14.9 % (ref 11.9–14.6)
GLOBULIN SER CALC-MCNC: 3.1 G/DL (ref 2.3–3.5)
GLUCOSE SERPL-MCNC: 92 MG/DL (ref 70–99)
HCT VFR BLD AUTO: 31.6 % (ref 41.1–50.3)
HGB BLD-MCNC: 10.4 G/DL (ref 13.6–17.2)
MAGNESIUM SERPL-MCNC: 1.9 MG/DL (ref 1.8–2.4)
MCH RBC QN AUTO: 30 PG (ref 26.1–32.9)
MCHC RBC AUTO-ENTMCNC: 32.9 G/DL (ref 31.4–35)
MCV RBC AUTO: 91.1 FL (ref 82–102)
NRBC # BLD: 0 K/UL (ref 0–0.2)
PLATELET # BLD AUTO: 110 K/UL (ref 150–450)
PMV BLD AUTO: 9.7 FL (ref 9.4–12.3)
POTASSIUM SERPL-SCNC: 3.3 MMOL/L (ref 3.5–5.1)
PROT SERPL-MCNC: 5.9 G/DL (ref 6.3–8.2)
RBC # BLD AUTO: 3.47 M/UL (ref 4.23–5.6)
SODIUM SERPL-SCNC: 140 MMOL/L (ref 136–145)
WBC # BLD AUTO: 5.7 K/UL (ref 4.3–11.1)

## 2025-04-24 PROCEDURE — 6360000002 HC RX W HCPCS: Performed by: FAMILY MEDICINE

## 2025-04-24 PROCEDURE — 6370000000 HC RX 637 (ALT 250 FOR IP): Performed by: FAMILY MEDICINE

## 2025-04-24 PROCEDURE — 97530 THERAPEUTIC ACTIVITIES: CPT

## 2025-04-24 PROCEDURE — 36415 COLL VENOUS BLD VENIPUNCTURE: CPT

## 2025-04-24 PROCEDURE — 2500000003 HC RX 250 WO HCPCS: Performed by: FAMILY MEDICINE

## 2025-04-24 PROCEDURE — 97161 PT EVAL LOW COMPLEX 20 MIN: CPT

## 2025-04-24 PROCEDURE — 80053 COMPREHEN METABOLIC PANEL: CPT

## 2025-04-24 PROCEDURE — 1100000000 HC RM PRIVATE

## 2025-04-24 PROCEDURE — 97116 GAIT TRAINING THERAPY: CPT

## 2025-04-24 PROCEDURE — 83735 ASSAY OF MAGNESIUM: CPT

## 2025-04-24 PROCEDURE — 85027 COMPLETE CBC AUTOMATED: CPT

## 2025-04-24 PROCEDURE — 97165 OT EVAL LOW COMPLEX 30 MIN: CPT

## 2025-04-24 PROCEDURE — 99222 1ST HOSP IP/OBS MODERATE 55: CPT | Performed by: PHYSICIAN ASSISTANT

## 2025-04-24 RX ADMIN — SODIUM CHLORIDE, PRESERVATIVE FREE 10 ML: 5 INJECTION INTRAVENOUS at 21:30

## 2025-04-24 RX ADMIN — ATORVASTATIN CALCIUM 40 MG: 40 TABLET, FILM COATED ORAL at 08:55

## 2025-04-24 RX ADMIN — FLUOXETINE HYDROCHLORIDE 20 MG: 20 CAPSULE ORAL at 08:55

## 2025-04-24 RX ADMIN — SODIUM CHLORIDE, PRESERVATIVE FREE 10 ML: 5 INJECTION INTRAVENOUS at 08:58

## 2025-04-24 RX ADMIN — ASPIRIN 81 MG: 81 TABLET ORAL at 08:54

## 2025-04-24 RX ADMIN — LOSARTAN POTASSIUM 25 MG: 25 TABLET, FILM COATED ORAL at 08:54

## 2025-04-24 RX ADMIN — POTASSIUM CHLORIDE 40 MEQ: 1500 TABLET, EXTENDED RELEASE ORAL at 10:00

## 2025-04-24 RX ADMIN — ENOXAPARIN SODIUM 40 MG: 100 INJECTION SUBCUTANEOUS at 08:55

## 2025-04-24 NOTE — PROGRESS NOTES
EOS     Pt resting in chair at this time. Pt denies any needs or pain. Call light and personal belongings left within reach. Pt encouraged to call with any needs. Hourly rounding completed during this shift. Pt will be NPO at [facility] tonight for an IR procedure tomorrow. Pt will have an IVC filter placed for his blood clot in his leg. Family updated via phone. Pt did refuse his dinner tray and became agitated with staff, pt was able to be redirected to coloring which calmed him down.

## 2025-04-24 NOTE — PROGRESS NOTES
Hospitalist Progress Note   Admit Date:  2025  6:59 PM   Name:  Kevin Clark   Age:  84 y.o.  Sex:  male  :  1940   MRN:  [id number]   Room:      Presenting/Chief Complaint: No chief complaint on file.     Reason(s) for Admission: Acute urinary tract infection [N39.0]  Acute UTI [N39.0]     Hospital Course:   Kevin Clark is a 84 y.o. male with medical history of bladder cancer s/p cystoprostatectomy with ileal conduit 2008, a flutter not on OAC d/t bleeding through urostomy, CAD s/p CABG, CKD 3, dementia who presented with abdominal pain and urine leaking around his urostomy site of several day duration.  Per chart review patient lives with his son, Jamie.  Patient was having right lower quadrant abdominal pain that started on  and there was noted to be urine leaking around ostomy site which prompted patient's son to bring him to [facility] ED and patient was subsequently transferred to [facility].  Of note patient follows outpatient with [facility].  Patient also follows with IR for ureteral stent exchange.     On arrival to the ED, notable labs include hemoglobin 11.8, BUN 29, creatinine 1.58.  UA positive for nitrites and leukocyte esterase as well as few bacteria.  CT abdomen pelvis showed postoperative changes and cystectomy with ileal conduit formation.  Moderate left hydronephrosis also seen with findings concerning for ascending urinary tract infection, mild right hydronephrosis with dilation of renal pelvis and ureter without associated inflammatory changes.  Left common femoral vein thrombosis also seen and recommend correlation with ultrasound.  Urology consulted.  IR consulted for stent exchange planned for .  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  Patient did not tolerate anticoagulation in the past. IR consult for evaluation for IVC filter.  PT/OT  3.2 - 4.6 g/dL    Globulin 3.1 2.3 - 3.5 g/dL    Albumin/Globulin Ratio 0.9 (L) 1.0 - 1.9     Magnesium    Collection Time: 04/24/25  5:07 AM   Result Value Ref Range    Magnesium 1.9 1.8 - 2.4 mg/dL       No results for input(s): \"COVID19\" in the last 72 hours.    Current Meds:  Current Facility-Administered Medications   Medication Dose Route Frequency    fentaNYL (SUBLIMAZE) injection    PRN    iohexol (OMNIPAQUE) injection    PRN    aspirin EC tablet 81 mg  81 mg Oral Daily    FLUoxetine (PROZAC) capsule 20 mg  20 mg Oral Daily    losartan (COZAAR) tablet 25 mg  25 mg Oral Daily    atorvastatin (LIPITOR) tablet 40 mg  40 mg Oral Daily    sodium chloride flush 0.9 % injection 5-40 mL  5-40 mL IntraVENous 2 times per day    sodium chloride flush 0.9 % injection 5-40 mL  5-40 mL IntraVENous PRN    0.9 % sodium chloride infusion   IntraVENous PRN    potassium chloride (KLOR-CON M) extended release tablet 40 mEq  40 mEq Oral PRN    Or    potassium bicarb-citric acid (EFFER-K) effervescent tablet 40 mEq  40 mEq Oral PRN    Or    potassium chloride 10 mEq/100 mL IVPB (Peripheral Line)  10 mEq IntraVENous PRN    magnesium sulfate 2000 mg in 50 mL IVPB premix  2,000 mg IntraVENous PRN    enoxaparin (LOVENOX) injection 40 mg  40 mg SubCUTAneous Daily    ondansetron (ZOFRAN-ODT) disintegrating tablet 4 mg  4 mg Oral Q8H PRN    Or    ondansetron (ZOFRAN) injection 4 mg  4 mg IntraVENous Q6H PRN    polyethylene glycol (GLYCOLAX) packet 17 g  17 g Oral Daily PRN    acetaminophen (TYLENOL) tablet 650 mg  650 mg Oral Q6H PRN    Or    acetaminophen (TYLENOL) suppository 650 mg  650 mg Rectal Q6H PRN       Signed:  Donte Terrell DO    Part of this note may have been written by using a voice dictation software.  The note has been proof read but may still contain some grammatical/other typographical errors.

## 2025-04-24 NOTE — THERAPY EVALUATION
ACUTE PHYSICAL THERAPY GOALS:   (Developed with and agreed upon by patient and/or caregiver.)  Kevin Clark  will demonstrate sup<>sit transfer with supv using bedrails in 7 therapy sessions to improve bed mobility.  Kevin Clark will demonstrate STS transfer with supv using BUE support and LRAD in 7 therapy sessions to improve transfers.  Kevin Clark will ambulate with supv and LRAD x 450 ft in 7 therapy sessions to improve functional mobility.   Kevin Clark will tolerate 15 min of TA/TE in 7 therapy sessions to improve strength and endurance.  Kevin Clark will improve AMPAC score to 24 / 24 in 7 therapy sessions to demonstrate reduced fall risk.      PHYSICAL THERAPY Initial Assessment, Daily Note, and AM  (Link to Caseload Tracking: PT Visit Days : 1  Acknowledge Orders  Time In/Out  PT Charge Capture  Rehab Caseload Tracker    Kevin Clark is a 84 y.o. male   PRIMARY DIAGNOSIS: Acute UTI  Acute urinary tract infection [N39.0]  Acute UTI [N39.0]       Reason for Referral: Generalized Muscle Weakness (M62.81)  Difficulty in walking, Not elsewhere classified (R26.2)  Other abnormalities of gait and mobility (R26.89)  History of falling (Z91.81)  Inpatient: Payor: [employer] MEDICARE / Plan: [employer] GOLD PLUS HMO / Product Type: *No Product type* /     ASSESSMENT:     REHAB RECOMMENDATIONS:   Recommendation to date pending progress:  Setting:  Home Health Therapy resume home health    Equipment:    None     ASSESSMENT:  Mr. Clark is found supine in bed upon PT arrival in no apparent distress, agreeable to eval with IV intact. Patient is a 84 y.o. year old male admitted on 4/21/2025 for acute UTI with PMH of bladder CA, LLE DVTs, CAD s/p CABG, CKD, HTN, dementia. He is pleasantly confused, oriented to self only. He denies pain.  Patient requires SBA for sup>sit, cues for hand placement and sequencing, and CGA for STS using RW with good hand placement. Next, he is able to amb with CGA RW x

## 2025-04-24 NOTE — PLAN OF CARE
Problem: Discharge Planning  Goal: Discharge to home or other facility with appropriate resources  4/24/2025 0938 by Renae Connelly RN  Outcome: Progressing  4/24/2025 0045 by Agnes Bai RN  Outcome: Progressing     Problem: ABCDS Injury Assessment  Goal: Absence of physical injury  4/24/2025 0938 by Renae Connelly RN  Outcome: Progressing  4/24/2025 0045 by Agnes Bai RN  Outcome: Progressing     Problem: Safety - Adult  Goal: Free from fall injury  4/24/2025 0938 by Renae Connelly RN  Outcome: Progressing  4/24/2025 0045 by Agnes Bai RN  Outcome: Progressing

## 2025-04-24 NOTE — PLAN OF CARE
Problem: Discharge Planning  Goal: Discharge to home or other facility with appropriate resources  Outcome: Progressing     Problem: ABCDS Injury Assessment  Goal: Absence of physical injury  Outcome: Progressing     Problem: Safety - Adult  Goal: Free from fall injury  Outcome: Progressing      no

## 2025-04-24 NOTE — CONSULTS
[facility]  Department of Interventional Radiology  [phone]        Consult Note     Patient: Kevin Clark MRN: [id number]  SSN: [id number]    YOB: 1940  Age: 84 y.o.  Sex: male      Referring Physician: GELA Terrell DO    Consult Date: 4/24/2025     Subjective:     Chief Complaint: DVT    History of Present Illness: Kevin Clark is a 84 y.o. male who is seen in consultation for LLE DVT.  Pt known to IR section for maintenance of his chronic, indwelling retrograde nephroureteral drain which was exchanged yesterday.  CT scan performed at [facility] 4/21/25 (I am unable to find record of this visit or CT scan) revealed nonocclusive thrombus in the left CFV and FV. US here at [facility] confirms findings. He denies swelling.    Hx bladder cancer s/p cystoprostatectomy with ileal conduit 7/2008, Atrial flutter not on OAC d/t bleeding through urostomy, CAD s/p CABG, CKD 3, and dementia. Per pt's son Pierce whom patient lives with, pt started to complain of RLQ abdominal pain on 4/20 and noticed more urine leaking around his urostomy site.  Pt presented to [facility] ED and was found to have a UTI with hydronephrosis on CTAP, therefore pt was requested to be transferred to [facility] as he follows [facility].       I called the pt's son Pierce.  He has limited accounts of his father's history as they were estranged for the last 2.5 years.  They have only reconnected in the last 2.5 months.  He tells me that although his father moves slow, he has not fallen and he recalls that the bleeding from his urostomy while on anticoagulation was at least 2.5 years ago.     Past Medical History:   Diagnosis Date    Acute encephalopathy 6/18/2021    Arthritis     OA- bilat knees    Bladder absent     stoma present    Bladder cancer (HCC)     CAD (coronary artery disease)     CABG x 4 in 2004 - no damage- emergenc CABG- Cath 1/2011- total occlusion ramus, total mid 
Neph U stent replaced.  
Urology Consult    Requesting MD:     Patient: Kevin Clark MRN: [id number]  SSN: [id number]    YOB: 1940  Age: 84 y.o.  Sex: male      Subjective:      Kevin Clark is a 84 y.o. male who with ileal conduit 7/18/2008. Follows [facility] and IR outpatient. Has urostomy and ureteral stent in place. Last IR note for ureteral stent change was 6/8/2023.   In ED 4/21, CTAP was done and shows postoperative changes status post cystectomy and ileal conduit formation; moderate left hydronephrosis and additional findings concerning for ascending urinary tract infection; left nephroureteral stent appears appropriately positioned; mild right hydronephrosis with dilation of renal pelvis and ureter without associated inflammatory changes and without prior for comparison  Abx: Rocephin (4/21-...)  UCX (not able to see from [facility], will reorder here though pt already got a dose of Rocephin in ED)  BCX: pending  Consult Urology, appreciate recs     ? LLE DVT  CTAP 4/21 captured probable left common femoral vein thrombosis recommend correlation with ultrasound  Obtain venous duplex of b/l LE's  Cont Lovenox SQ for DVT ppx for now. If Duplex LE +DVT, consider increase to treatment dose of 1mg/kg BID. Of note, pt has a history of aflutter and could not tolerate Eliquis in 2023 d/t bleeding in urostomy and also due to cost w/ no insurance. He has insurance now. If could not tolerate AC, consider IVC filter.      CAD s/p CABG 2011  A flutter  Patient follows [facility] Dr. Rojo outpatient.  History of bleeding in urostomy with Eliquis and also due to medication cost, patient declined anticoagulation.  Also did not want to undergo ablation.  Continue ASA and statin  Follow-up with cardiology outpatient     CKD 3  Creatinine at baseline 1.5-1.7, was 1.58 on admission  Avoid nephrotoxic meds  CTM BMP     HTN  Continue home losartan     Anxiety/depression  Continue home Prozac   
Clear

## 2025-04-24 NOTE — THERAPY EVALUATION
ACUTE OCCUPATIONAL THERAPY GOALS:   (Developed with and agreed upon by patient and/or caregiver.)  1. Patient will complete lower body bathing and dressing with SUPERVISION and adaptive equipment as needed.     2. Patient will complete toileting with SUPERVISION.  3. Patient will complete grooming ADL standing edge of sink with SUPERVISION.  4. Patient will tolerate 25 minutes of OT treatment with 1-2 rest breaks to increase activity tolerance for ADLs.   5. Patient will complete functional transfers with SUPERVISION and adaptive equipment as needed.   6. Patient will tolerate 10 minutes BUE exercises to increase strength for safe, functional transfers.     Timeframe: 7 visits      OCCUPATIONAL THERAPY Initial Assessment, Daily Note, and AM       OT Visit Days: 1  Acknowledge Orders  Time  OT Charge Capture  Rehab Caseload Tracker      Kevin Clark is a 84 y.o. male   PRIMARY DIAGNOSIS: Acute UTI  Acute urinary tract infection [N39.0]  Acute UTI [N39.0]       Reason for Referral: Generalized Muscle Weakness (M62.81)  Other lack of cordination (R27.8)  Difficulty in walking, Not elsewhere classified (R26.2)  Inpatient: Payor: HU[employer] MEDICARE / Plan: [employer] GOLD PLUS HMO / Product Type: *No Product type* /     ASSESSMENT:     REHAB RECOMMENDATIONS:   Recommendation to date pending progress:  Setting:  Home Health Therapy    Equipment:    To Be Determined     ASSESSMENT:  Mr. Clark is an 85 y/o male presents with a UTI, pmh notable for dementia. Today, pt demonstrates impairments in strength, balance, activity tolerance, ADLs and functional mobility. Pt overall SBA/CGA using rolling walker for functional transfers and mobility of household distances. Pt completed grooming, sponge bath and dressing tasks standing edge of sink with CGA and cueing for sequencing and safety. Pt appears to be functioning below his baseline and would benefit from further acute OT services during acute care stay to promote

## 2025-04-25 ENCOUNTER — HOSPITAL ENCOUNTER (INPATIENT)
Dept: INTERVENTIONAL RADIOLOGY/VASCULAR | Age: 85
Discharge: HOME | End: 2025-04-27
Attending: STUDENT IN AN ORGANIZED HEALTH CARE EDUCATION/TRAINING PROGRAM
Payer: MEDICARE

## 2025-04-25 VITALS
TEMPERATURE: 97.6 F | OXYGEN SATURATION: 95 % | HEART RATE: 41 BPM | SYSTOLIC BLOOD PRESSURE: 135 MMHG | DIASTOLIC BLOOD PRESSURE: 67 MMHG

## 2025-04-25 LAB
ANION GAP SERPL CALC-SCNC: 8 MMOL/L (ref 7–16)
BUN SERPL-MCNC: 14 MG/DL (ref 8–23)
CALCIUM SERPL-MCNC: 8.7 MG/DL (ref 8.8–10.2)
CHLORIDE SERPL-SCNC: 110 MMOL/L (ref 98–107)
CO2 SERPL-SCNC: 24 MMOL/L (ref 20–29)
CREAT SERPL-MCNC: 1.41 MG/DL (ref 0.8–1.3)
ERYTHROCYTE [DISTWIDTH] IN BLOOD BY AUTOMATED COUNT: 15.2 % (ref 11.9–14.6)
GLUCOSE SERPL-MCNC: 93 MG/DL (ref 70–99)
HCT VFR BLD AUTO: 34.7 % (ref 41.1–50.3)
HGB BLD-MCNC: 11.7 G/DL (ref 13.6–17.2)
MCH RBC QN AUTO: 30.1 PG (ref 26.1–32.9)
MCHC RBC AUTO-ENTMCNC: 33.7 G/DL (ref 31.4–35)
MCV RBC AUTO: 89.2 FL (ref 82–102)
NRBC # BLD: 0 K/UL (ref 0–0.2)
PLATELET # BLD AUTO: 136 K/UL (ref 150–450)
PMV BLD AUTO: 9.7 FL (ref 9.4–12.3)
POTASSIUM SERPL-SCNC: 4.3 MMOL/L (ref 3.5–5.1)
RBC # BLD AUTO: 3.89 M/UL (ref 4.23–5.6)
SODIUM SERPL-SCNC: 142 MMOL/L (ref 136–145)
WBC # BLD AUTO: 5.4 K/UL (ref 4.3–11.1)

## 2025-04-25 PROCEDURE — 06H03DZ INSERTION OF INTRALUMINAL DEVICE INTO INFERIOR VENA CAVA, PERCUTANEOUS APPROACH: ICD-10-PCS | Performed by: RADIOLOGY

## 2025-04-25 PROCEDURE — 6370000000 HC RX 637 (ALT 250 FOR IP): Performed by: FAMILY MEDICINE

## 2025-04-25 PROCEDURE — 2500000003 HC RX 250 WO HCPCS: Performed by: FAMILY MEDICINE

## 2025-04-25 PROCEDURE — 37191 INS ENDOVAS VENA CAVA FILTR: CPT | Performed by: RADIOLOGY

## 2025-04-25 PROCEDURE — 80048 BASIC METABOLIC PNL TOTAL CA: CPT

## 2025-04-25 PROCEDURE — 37191 INS ENDOVAS VENA CAVA FILTR: CPT

## 2025-04-25 PROCEDURE — 85027 COMPLETE CBC AUTOMATED: CPT

## 2025-04-25 PROCEDURE — 1100000000 HC RM PRIVATE

## 2025-04-25 PROCEDURE — 6360000004 HC RX CONTRAST MEDICATION: Performed by: RADIOLOGY

## 2025-04-25 PROCEDURE — 36415 COLL VENOUS BLD VENIPUNCTURE: CPT

## 2025-04-25 PROCEDURE — 6360000002 HC RX W HCPCS: Performed by: RADIOLOGY

## 2025-04-25 PROCEDURE — 99152 MOD SED SAME PHYS/QHP 5/>YRS: CPT | Performed by: RADIOLOGY

## 2025-04-25 RX ORDER — FENTANYL CITRATE 50 UG/ML
INJECTION, SOLUTION INTRAMUSCULAR; INTRAVENOUS PRN
Status: COMPLETED | OUTPATIENT
Start: 2025-04-25 | End: 2025-04-25

## 2025-04-25 RX ORDER — MIDAZOLAM HYDROCHLORIDE 2 MG/2ML
INJECTION, SOLUTION INTRAMUSCULAR; INTRAVENOUS PRN
Status: COMPLETED | OUTPATIENT
Start: 2025-04-25 | End: 2025-04-25

## 2025-04-25 RX ORDER — LIDOCAINE HYDROCHLORIDE 10 MG/ML
INJECTION, SOLUTION INFILTRATION; PERINEURAL PRN
Status: COMPLETED | OUTPATIENT
Start: 2025-04-25 | End: 2025-04-25

## 2025-04-25 RX ORDER — IOPAMIDOL 612 MG/ML
INJECTION, SOLUTION INTRAVASCULAR PRN
Status: COMPLETED | OUTPATIENT
Start: 2025-04-25 | End: 2025-04-25

## 2025-04-25 RX ADMIN — IOPAMIDOL 30 ML: 612 INJECTION, SOLUTION INTRAVENOUS at 09:54

## 2025-04-25 RX ADMIN — ATORVASTATIN CALCIUM 40 MG: 40 TABLET, FILM COATED ORAL at 11:22

## 2025-04-25 RX ADMIN — FLUOXETINE HYDROCHLORIDE 20 MG: 20 CAPSULE ORAL at 11:22

## 2025-04-25 RX ADMIN — LIDOCAINE HYDROCHLORIDE 5 ML: 10 INJECTION, SOLUTION INFILTRATION; PERINEURAL at 09:46

## 2025-04-25 RX ADMIN — SODIUM CHLORIDE, PRESERVATIVE FREE 10 ML: 5 INJECTION INTRAVENOUS at 20:07

## 2025-04-25 RX ADMIN — FENTANYL CITRATE 25 MCG: 50 INJECTION, SOLUTION INTRAMUSCULAR; INTRAVENOUS at 09:40

## 2025-04-25 RX ADMIN — ASPIRIN 81 MG: 81 TABLET ORAL at 11:23

## 2025-04-25 RX ADMIN — MIDAZOLAM HYDROCHLORIDE 0.5 MG: 1 INJECTION, SOLUTION INTRAMUSCULAR; INTRAVENOUS at 09:40

## 2025-04-25 RX ADMIN — LOSARTAN POTASSIUM 25 MG: 25 TABLET, FILM COATED ORAL at 11:23

## 2025-04-25 RX ADMIN — POTASSIUM CHLORIDE 40 MEQ: 1500 TABLET, EXTENDED RELEASE ORAL at 11:22

## 2025-04-25 NOTE — CONSULTS
I agree that an IVC filter is considered medically necessary at this point. Given the questionable consent status, I am ok with proceeding with filter placement at this point.

## 2025-04-25 NOTE — PRE SEDATION
Sedation Pre-Procedure Note    Patient Name: Kevin Clark   YOB: 1940  Room/Bed: Room/bed info not found  Medical Record Number: [id number]  Date: 4/25/2025   Time: 9:30 AM       Indication:  DVT    Consent: I have discussed with the patient and/or the patient representative the indication, alternatives, and the possible risks and/or complications of the planned procedure and the anesthesia methods. The patient and/or patient representative appear to understand and agree to proceed.    Vital Signs:   Vitals:    04/25/25 0824   BP: (!) 160/75   Pulse: (!) 47   Resp: 17   Temp: 97.6 °F (36.4 °C)   SpO2: 97%       Past Medical History:   has a past medical history of Acute encephalopathy, Arthritis, Bladder absent, Bladder cancer (HCC), CAD (coronary artery disease), Cancer (HCC), CRI (chronic renal insufficiency), stage 3 (moderate) (HCC), Depression, Family history of diabetes mellitus, Heart murmur, Hypercholesteremia, Hypertension, Infection, Other complication of colostomy or enterostomy, Parastomal hernia of ileal conduit, Unspecified intestinal obstruction, Urinary tract infection, and Ventral hernia, unspecified, without mention of obstruction or gangrene.    Past Surgical History:   has a past surgical history that includes hernia repair (9/2011); Colonoscopy (1/07); IR GUIDED NEPHROSTOMY CATH PLACEMENT LEFT (6/21/2021); IR GUIDED CONVERT EXIST NEPHROSTOMY CATH TO NEPHROURETERAL CATH (8/3/2021); IR GUIDED NEPHROSTOMY CATH EXCHANGE (8/6/2021); IR GUIDED NEPHROSTOMY CATH EXCHANGE (10/12/2021); IR GUIDED NEPHROSTOMY CATH EXCHANGE (11/22/2021); IR CHANGE URET TUBE VIA ILEAL CONDUIT (3/3/2022); hernia repair (8/26/11); Vasectomy (1974); remv bladder,total (7/2008); pr unlisted procedure cardiac surgery (2004); Cardiac catheterization (2011); IR GUIDED NEPHROSTOGRAM/URETEROGRAM EXISTING ACCESS (3/8/2022); IR GUIDED NEPHROSTOMY CATH PLACEMENT LEFT (6/21/2021); and IR GUIDED CONVERT EXIST NEPHROSTOMY

## 2025-04-25 NOTE — PROGRESS NOTES
TRANSFER - IN REPORT:    Verbal report received from STAR Pink on Kevin Clark  being received from PACU for routine progression of patient care      Report consisted of patient's Situation, Background, Assessment and   Recommendations(SBAR).     Information from the following report(s) Nurse Handoff Report was reviewed with the receiving nurse.    Opportunity for questions and clarification was provided.      Assessment to be completed upon patient's arrival to unit and then care will be assumed.

## 2025-04-25 NOTE — PROGRESS NOTES
TRANSFER - OUT REPORT:    Verbal report given to STAR Portillo on Kevin Clark  being transferred to 2nd floor for routine post-op       Report consisted of patient's Situation, Background, Assessment and   Recommendations(SBAR).     Information from the following report(s) Surgery Report and MAR was reviewed with the receiving nurse.           Lines:   Peripheral IV 04/21/25 Distal;Right;Anterior Cephalic (Active)   Site Assessment Clean, dry & intact 04/25/25 0502   Line Status Capped;Normal saline locked 04/25/25 0502   Line Care Connections checked and tightened 04/25/25 0502   Phlebitis Assessment No symptoms 04/25/25 0502   Infiltration Assessment 0 04/25/25 0502   Alcohol Cap Used Yes 04/25/25 0502   Dressing Status Clean, dry & intact 04/25/25 0502   Dressing Type Transparent 04/25/25 0502        Opportunity for questions and clarification was provided.

## 2025-04-25 NOTE — PROGRESS NOTES
TRANSFER - OUT REPORT:    Verbal report given to STAR Esposito on Kevin Clark  being transferred to IR prep room 4 for routine post-op       Report consisted of patient's Situation, Background, Assessment and   Recommendations(SBAR).     Information from the following report(s) Surgery Report and MAR was reviewed with the receiving nurse.           Lines:   Peripheral IV 04/21/25 Distal;Right;Anterior Cephalic (Active)   Site Assessment Clean, dry & intact 04/25/25 0502   Line Status Capped;Normal saline locked 04/25/25 0502   Line Care Connections checked and tightened 04/25/25 0502   Phlebitis Assessment No symptoms 04/25/25 0502   Infiltration Assessment 0 04/25/25 0502   Alcohol Cap Used Yes 04/25/25 0502   Dressing Status Clean, dry & intact 04/25/25 0502   Dressing Type Transparent 04/25/25 0502        Opportunity for questions and clarification was provided.

## 2025-04-25 NOTE — PROGRESS NOTES
Pt went down today for a procedure. All known needs met at this time. Bed is currently low, call light was left in reach, and pt was encouraged to ask for assistance. Pt was left resting in bed with no complaints.

## 2025-04-25 NOTE — PROGRESS NOTES
During this hospitalization I discussed patient's case with son, Jamie, over the phone regarding possible need for IVC filter as he did not tolerate oral anticoagulation in the past.  Patient at high risk of fall injury and bleeding given dementia and history of bleeding on oral anticoagulation.  At time of conversation son agreed with plan for IVC filter.  IVC filter medically necessary given DVT and unable to tolerate oral anticoagulation.

## 2025-04-25 NOTE — PROGRESS NOTES
Hospitalist Progress Note   Admit Date:  2025  6:59 PM   Name:  Kevin Clark   Age:  84 y.o.  Sex:  male  :  1940   MRN:  [id number]   Room:      Presenting/Chief Complaint: No chief complaint on file.     Reason(s) for Admission: Acute urinary tract infection [N39.0]  Acute UTI [N39.0]     Hospital Course:   Kevin Clark is a 84 y.o. male with medical history of bladder cancer s/p cystoprostatectomy with ileal conduit 2008, a flutter not on OAC d/t bleeding through urostomy, CAD s/p CABG, CKD 3, dementia who presented with abdominal pain and urine leaking around his urostomy site of several day duration.  Per chart review patient lives with his son, Jamie.  Patient was having right lower quadrant abdominal pain that started on  and there was noted to be urine leaking around ostomy site which prompted patient's son to bring him to [facility] ED and patient was subsequently transferred to [facility] [place].  Of note patient follows outpatient with [place] [facility].  Patient also follows with IR for ureteral stent exchange.     On arrival to the ED, notable labs include hemoglobin 11.8, BUN 29, creatinine 1.58.  UA positive for nitrites and leukocyte esterase as well as few bacteria.  CT abdomen pelvis showed postoperative changes and cystectomy with ileal conduit formation.  Moderate left hydronephrosis also seen with findings concerning for ascending urinary tract infection, mild right hydronephrosis with dilation of renal pelvis and ureter without associated inflammatory changes.  Left common femoral vein thrombosis also seen and recommend correlation with ultrasound.  Urology consulted.  IR consulted for stent exchange planned for .  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  Patient did not tolerate anticoagulation in the past. IR consulted, IVC filter placed on  PT/OT recommending  24 hour   Intake --   Output 1425 ml   Net -1425 ml         Physical Exam:   General:    Chronically ill-appearing, lying in bed, no acute distress, dementia appears at baseline  Head:  Normocephalic, atraumatic  Eyes:  Sclerae appear normal.  Pupils equally round.  ENT:  Nares appear normal.  Moist oral mucosa  Neck:  No restricted ROM.  Trachea midline, bandage on right neck status post IVC filter placement  CV:   RRR.  No m/r/g.   Lungs:   CTAB.  No wheezing, rhonchi, or rales.  Symmetric expansion.  Abdomen:   Soft, nontender, nondistended.  Urostomy bag in place without evidence of infection   extremities: No cyanosis or clubbing.  No edema  Skin:     No rashes.  Normal coloration.   Warm and dry.    Neuro:  CN II-XII grossly intact.  Oriented to person only which appears near baseline after discussing with patient's son over the phone      I have personally reviewed labs and tests:  Recent Labs:  Recent Results (from the past 48 hours)   CBC    Collection Time: 04/24/25  5:07 AM   Result Value Ref Range    WBC 5.7 4.3 - 11.1 K/uL    RBC 3.47 (L) 4.23 - 5.6 M/uL    Hemoglobin 10.4 (L) 13.6 - 17.2 g/dL    Hematocrit 31.6 (L) 41.1 - 50.3 %    MCV 91.1 82 - 102 FL    MCH 30.0 26.1 - 32.9 PG    MCHC 32.9 31.4 - 35.0 g/dL    RDW 14.9 (H) 11.9 - 14.6 %    Platelets 110 (L) 150 - 450 K/uL    MPV 9.7 9.4 - 12.3 FL    nRBC 0.00 0.0 - 0.2 K/uL   Comprehensive Metabolic Panel w/ Reflex to MG    Collection Time: 04/24/25  5:07 AM   Result Value Ref Range    Sodium 140 136 - 145 mmol/L    Potassium 3.3 (L) 3.5 - 5.1 mmol/L    Chloride 109 (H) 98 - 107 mmol/L    CO2 22 20 - 29 mmol/L    Anion Gap 9 7 - 16 mmol/L    Glucose 92 70 - 99 mg/dL    BUN 13 8 - 23 MG/DL    Creatinine 1.28 0.80 - 1.30 MG/DL    Est, Glom Filt Rate 56 (L) >60 ml/min/1.73m2    Calcium 8.3 (L) 8.8 - 10.2 MG/DL    Total Bilirubin 0.3 0.0 - 1.2 MG/DL    ALT 13 8 - 55 U/L    AST 28 15 - 37 U/L    Alk Phosphatase 48 40 - 129 U/L    Total Protein 5.9 (L) 6.3

## 2025-04-25 NOTE — OP NOTE
[facility]  Department of Interventional Radiology  [phone]        Interventional Radiology Brief Procedure Note    Patient: Kevin Clark MRN: [id number]  SSN: [id number]    YOB: 1940  Age: 84 y.o.  Sex: male      Date of Procedure: 4/25/2025    Pre-Procedure Diagnosis: DVT    Post-Procedure Diagnosis: SAME    Procedure(s): IVC filter    Brief Description of Procedure: as above    Performed By: FELICITAS BRADEN MD     Assistants: None    Anesthesia:Moderate Sedation    Estimated Blood Loss: Less than 10ml    Specimens:  None    Implants:  Celect IVC filter    Findings: no caval thrombus    Complications: None    Recommendations: none     Follow Up: prn     Signed By: FELICITAS BRADEN MD     April 25, 2025

## 2025-04-26 VITALS
SYSTOLIC BLOOD PRESSURE: 129 MMHG | BODY MASS INDEX: 19.85 KG/M2 | OXYGEN SATURATION: 98 % | RESPIRATION RATE: 18 BRPM | TEMPERATURE: 98.1 F | DIASTOLIC BLOOD PRESSURE: 75 MMHG | WEIGHT: 138.67 LBS | HEIGHT: 70 IN | HEART RATE: 59 BPM

## 2025-04-26 PROCEDURE — 6370000000 HC RX 637 (ALT 250 FOR IP): Performed by: FAMILY MEDICINE

## 2025-04-26 RX ADMIN — ATORVASTATIN CALCIUM 40 MG: 40 TABLET, FILM COATED ORAL at 08:08

## 2025-04-26 RX ADMIN — LOSARTAN POTASSIUM 25 MG: 25 TABLET, FILM COATED ORAL at 08:08

## 2025-04-26 RX ADMIN — FLUOXETINE HYDROCHLORIDE 20 MG: 20 CAPSULE ORAL at 08:08

## 2025-04-26 RX ADMIN — ASPIRIN 81 MG: 81 TABLET ORAL at 08:07

## 2025-04-26 NOTE — CARE COORDINATION
Pt is for discharge home today with [facility] (RN, PT/OT).  Referral called/faxed to [facility] for follow up home care as ordered. [facility] to transport pt to verified address at 2 pm. MSW spoke with pt son (Pierce) to give time. No additional CM orders received or supportive care needs expressed at this time. IMM2 given.

## 2025-04-26 NOTE — PROGRESS NOTES
Discharge instructions discussed with pt, opportunity to ask questions provided, pt verbalizes understanding. PIV removed with no complications, dressing applied, catheter intact.

## 2025-04-26 NOTE — DISCHARGE SUMMARY
Hospitalist Discharge Summary   Admit Date:  2025  6:59 PM   DC Note date: 2025  Name:  Kevin Clark   Age:  84 y.o.  Sex:  male  :  1940   MRN:  [id number]   Room:  /  PCP:  Isabel Hernandez APRN - NP    Presenting Complaint: No chief complaint on file.     Initial Admission Diagnosis: Acute urinary tract infection [N39.0]  Acute UTI [N39.0]     Problem List for this Hospitalization (present on admission):    Principal Problem:    Acute UTI  Active Problems:    Typical atrial flutter (HCC)    History of coronary artery bypass graft    Stage 3a chronic kidney disease (HCC)    Mixed hyperlipidemia    Coronary artery disease involving coronary bypass graft of native heart without angina pectoris    Major depressive disorder with single episode, in full remission    Essential (primary) hypertension    DNR (do not resuscitate)    Palliative care encounter    History of bladder cancer    Dementia (HCC)    History of urostomy    Complication of Ileal conduit    Acute deep vein thrombosis (DVT) of left lower extremity (HCC)  Resolved Problems:    * No resolved hospital problems. *      Hospital Course:  Kevin Clark is a 84 y.o. male with medical history of bladder cancer s/p cystoprostatectomy with ileal conduit 2008, a flutter not on OAC d/t bleeding through urostomy, CAD s/p CABG, CKD 3, dementia who presented with abdominal pain and urine leaking around his urostomy site of several day duration.  Per chart review patient lives with his son, Pierce.  Patient was having right lower quadrant abdominal pain that started on  and there was noted to be urine leaking around ostomy site which prompted patient's son to bring him to [facility] ED and patient was subsequently transferred to [facility] [place].  Of note patient follows outpatient with [place] [facility].  Patient also follows with IR for ureteral stent exchanges.      On arrival to the ED, notable labs include  05/09/2024 01:20 AM    BLOODU LARGE 05/09/2024 01:20 AM    UROBILINOGEN 0.2 05/09/2024 01:20 AM    NITRU Positive 05/09/2024 01:20 AM    LEUKOCYTESUR LARGE 05/09/2024 01:20 AM    WBCUA >100 05/09/2024 01:20 AM    RBCUA 10-20 05/09/2024 01:20 AM    BACTERIA 4+ 05/09/2024 01:20 AM    LABCAST 0 05/09/2024 01:20 AM    MUCUS 2+ 05/09/2024 01:20 AM        Microbiology:  Results       Procedure Component Value Units Date/Time    Culture, Blood 1 [[id number]] Collected: 04/21/25 2240    Order Status: Completed Specimen: Blood Updated: 04/26/25 0734     Special Requests --        LEFT  Antecubital       Culture NO GROWTH 5 DAYS       Culture, Blood 1 [[id number]] Collected: 04/21/25 2232    Order Status: Completed Specimen: Blood Updated: 04/26/25 0734     Special Requests --        RIGHT  HAND       Culture NO GROWTH 5 DAYS       Culture, Urine [[id number]]     Order Status: Canceled Specimen: Urine, clean catch             All Labs from Last 24 Hrs:  No results found for this or any previous visit (from the past 24 hours).    No results for input(s): \"COVID19\" in the last 72 hours.    Recent Vital Data:  Patient Vitals for the past 24 hrs:   Temp Pulse Resp BP SpO2   04/26/25 0734 98.1 °F (36.7 °C) 59 18 129/75 98 %   04/26/25 0332 97.9 °F (36.6 °C) 56 20 137/77 100 %   04/25/25 2335 -- 56 -- -- --   04/25/25 2328 98.6 °F (37 °C) (!) 44 16 (!) 154/79 100 %   04/25/25 1934 98.1 °F (36.7 °C) 65 16 108/61 100 %   04/25/25 1503 97.9 °F (36.6 °C) 52 17 127/82 99 %   04/25/25 1129 -- (!) 42 -- (!) 149/74 --       Oxygen Therapy  SpO2: 98 %  Pulse via Oximetry: 50 beats per minute  Pulse Oximeter Device Location: Right, Hand  O2 Device: None (Room air)  Skin Assessment: Clean, dry, & intact  Oxygen Therapy: None (Room air)    Estimated body mass index is 19.9 kg/m² as calculated from the following:    Height as of this encounter: 1.778 m (5' 10\").    Weight as of this encounter: 62.9 kg (138 lb 10.7 oz).    Intake/Output Summary

## 2025-04-30 ENCOUNTER — TELEPHONE (OUTPATIENT)
Dept: FAMILY MEDICINE CLINIC | Facility: CLINIC | Age: 85
End: 2025-04-30

## 2025-04-30 NOTE — TELEPHONE ENCOUNTER
Sonia called from [facility] letting us know the patient was discharged from the Hospital over the weekend 4/26. She is wanting to know if you would still sign for his home health orders for him to continue with Nursing, PT and OT. I called her back to let her know that you would continue signing for his orders so he can get the care that he needs. She said they will be faxing over orders for you to sign off on.     I called his son to schedule him a follow up appointment with you as well per the hospital notes in his chart. He has been schedule to follow up with you on 5/6.

## 2025-05-06 ENCOUNTER — OFFICE VISIT (OUTPATIENT)
Dept: FAMILY MEDICINE CLINIC | Facility: CLINIC | Age: 85
End: 2025-05-06
Payer: MEDICARE

## 2025-05-06 VITALS
WEIGHT: 136 LBS | DIASTOLIC BLOOD PRESSURE: 71 MMHG | SYSTOLIC BLOOD PRESSURE: 139 MMHG | BODY MASS INDEX: 19.47 KG/M2 | HEIGHT: 70 IN | HEART RATE: 53 BPM

## 2025-05-06 DIAGNOSIS — G89.29 CHRONIC PAIN OF LEFT KNEE: ICD-10-CM

## 2025-05-06 DIAGNOSIS — M25.562 CHRONIC PAIN OF LEFT KNEE: ICD-10-CM

## 2025-05-06 DIAGNOSIS — F03.B0 MODERATE DEMENTIA WITHOUT BEHAVIORAL DISTURBANCE, PSYCHOTIC DISTURBANCE, MOOD DISTURBANCE, OR ANXIETY, UNSPECIFIED DEMENTIA TYPE (HCC): ICD-10-CM

## 2025-05-06 DIAGNOSIS — Z86.718 HISTORY OF DVT (DEEP VEIN THROMBOSIS): Primary | ICD-10-CM

## 2025-05-06 DIAGNOSIS — Z87.440 HISTORY OF UTI: ICD-10-CM

## 2025-05-06 PROCEDURE — 3078F DIAST BP <80 MM HG: CPT | Performed by: NURSE PRACTITIONER

## 2025-05-06 PROCEDURE — 1160F RVW MEDS BY RX/DR IN RCRD: CPT | Performed by: NURSE PRACTITIONER

## 2025-05-06 PROCEDURE — 3075F SYST BP GE 130 - 139MM HG: CPT | Performed by: NURSE PRACTITIONER

## 2025-05-06 PROCEDURE — 1111F DSCHRG MED/CURRENT MED MERGE: CPT | Performed by: NURSE PRACTITIONER

## 2025-05-06 PROCEDURE — 99214 OFFICE O/P EST MOD 30 MIN: CPT | Performed by: NURSE PRACTITIONER

## 2025-05-06 PROCEDURE — G8420 CALC BMI NORM PARAMETERS: HCPCS | Performed by: NURSE PRACTITIONER

## 2025-05-06 PROCEDURE — 1036F TOBACCO NON-USER: CPT | Performed by: NURSE PRACTITIONER

## 2025-05-06 PROCEDURE — 1123F ACP DISCUSS/DSCN MKR DOCD: CPT | Performed by: NURSE PRACTITIONER

## 2025-05-06 PROCEDURE — 1159F MED LIST DOCD IN RCRD: CPT | Performed by: NURSE PRACTITIONER

## 2025-05-06 PROCEDURE — G8427 DOCREV CUR MEDS BY ELIG CLIN: HCPCS | Performed by: NURSE PRACTITIONER

## 2025-05-06 ASSESSMENT — PATIENT HEALTH QUESTIONNAIRE - PHQ9: DEPRESSION UNABLE TO ASSESS: PT REFUSES

## 2025-05-06 NOTE — PROGRESS NOTES
Kevin Clark (:  1940) is a 84 y.o. male,Established patient, here for evaluation of the following chief complaint(s):  Follow-Up from Hospital (F3 Pt is here to fu from being discharged from the hospital --to discuss re-trialing OAC given DVT-didn't want to do blood thinners because he didn't do well the last time-put in a screen to catch it if it breaks up)         Assessment & Plan  History of DVT (deep vein thrombosis)    Continue aspirin do not think a candidate for Oral anticoagulant.          History of UTI    Follow up with urology  and interventional radiology for stent to be changed.   Did approach son with hospice urged to discuss with  SW and get information on when this may be appropriate for his father       Moderate dementia without behavioral disturbance, psychotic disturbance, mood disturbance, or anxiety, unspecified dementia type (HCC)    stable           No follow-ups on file.       Subjective   HPI Here for hospital follow up . Was in for a UTI found ot have DVT Had iVC filter placed. Sone accompanies pt . States he is doing better. Urine issues resolved no leakage around catheter. Is eating when food placed in front of him. Is sleeping well and seems pleasant and cooperative. Is having some left knee pain in past saw ortho for injections wishes to go again    Review of Systems   Left knee pain    Objective   Physical Exam  Vitals and nursing note reviewed.   Cardiovascular:      Rate and Rhythm: Normal rate and regular rhythm.      Pulses: Normal pulses.      Heart sounds: Normal heart sounds.   Pulmonary:      Effort: Pulmonary effort is normal.      Breath sounds: Normal breath sounds.   Musculoskeletal:         General: No swelling.   Skin:     General: Skin is warm and dry.      Comments: Dry and flaking   Psychiatric:         Mood and Affect: Mood normal.         Behavior: Behavior normal.      Comments: smiling        Frail elderly seated in wheelchair Allows son to

## 2025-05-19 ENCOUNTER — OFFICE VISIT (OUTPATIENT)
Dept: ORTHOPEDIC SURGERY | Age: 85
End: 2025-05-19
Payer: MEDICARE

## 2025-05-19 DIAGNOSIS — M17.11 PRIMARY OSTEOARTHRITIS OF RIGHT KNEE: ICD-10-CM

## 2025-05-19 DIAGNOSIS — M17.12 PRIMARY OSTEOARTHRITIS OF LEFT KNEE: ICD-10-CM

## 2025-05-19 DIAGNOSIS — M25.562 LEFT KNEE PAIN, UNSPECIFIED CHRONICITY: Primary | ICD-10-CM

## 2025-05-19 PROCEDURE — G8420 CALC BMI NORM PARAMETERS: HCPCS | Performed by: ORTHOPAEDIC SURGERY

## 2025-05-19 PROCEDURE — 20610 DRAIN/INJ JOINT/BURSA W/O US: CPT | Performed by: ORTHOPAEDIC SURGERY

## 2025-05-19 PROCEDURE — G8428 CUR MEDS NOT DOCUMENT: HCPCS | Performed by: ORTHOPAEDIC SURGERY

## 2025-05-19 PROCEDURE — 1123F ACP DISCUSS/DSCN MKR DOCD: CPT | Performed by: ORTHOPAEDIC SURGERY

## 2025-05-19 PROCEDURE — 1111F DSCHRG MED/CURRENT MED MERGE: CPT | Performed by: ORTHOPAEDIC SURGERY

## 2025-05-19 PROCEDURE — 99204 OFFICE O/P NEW MOD 45 MIN: CPT | Performed by: ORTHOPAEDIC SURGERY

## 2025-05-19 PROCEDURE — 1036F TOBACCO NON-USER: CPT | Performed by: ORTHOPAEDIC SURGERY

## 2025-05-19 RX ORDER — METHYLPREDNISOLONE ACETATE 40 MG/ML
40 INJECTION, SUSPENSION INTRA-ARTICULAR; INTRALESIONAL; INTRAMUSCULAR; SOFT TISSUE ONCE
Status: COMPLETED | OUTPATIENT
Start: 2025-05-19 | End: 2025-05-19

## 2025-05-19 RX ADMIN — METHYLPREDNISOLONE ACETATE 40 MG: 40 INJECTION, SUSPENSION INTRA-ARTICULAR; INTRALESIONAL; INTRAMUSCULAR; SOFT TISSUE at 15:27

## 2025-05-19 NOTE — PROGRESS NOTES
exam: On initial examination, patient comes in by way of wheelchair.  He can stand and ambulate with the use of a walker with varus deformity present in both knees.  Although he is pleasant and engaging on the visit he really has very little memory cannot remember his previous occupation or what he did most of his life.    On examination while standing there is decreased flexion in the lumbosacral spine without acute list or spasm.    While seated ,  the Bilateral hip is examined there is full range of motion without obvious issue .     On examination of the  Left knee :ROM is 0 to 125 The knee is in varus which corrects with valgus stress to some degree and There is no obvious effusion. On examination of the  Right knee :ROM is 0 to 125 The knee is in varus which corrects with valgus stress to some degree and There is no obvious effusion.    Patient is neurologically intact distally. Skin is intact.       Imaging:   Radiographs:    An AP standing, flexion lateral, and sunrise view of the bilateral knees were obtained  This demonstrated  Marked medial joint space narrowing and Marked osteophyte formation in all 3 compartments.    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  This relatively advanced in nature in an 84-year-old male with numerous social and medical comorbidities including a chronic ileostomy and recent renal concerns with hydronephrosis, relatively profound dementia and memory loss, and a recent significant diagnosed lower extremity DVT with a vena cava filter recently placed.    I did discuss with the patient the source of the pain and treatment options.  We discussed nonsurgical measures including:Injection therapy, Bracing, and Activity Modification.  We also discussed proper use of an assistive device.    I counseled the patient regarding the difference between various injection therapies.  I explained the role of steroid

## 2025-05-21 ENCOUNTER — OFFICE VISIT (OUTPATIENT)
Dept: UROLOGY | Age: 85
End: 2025-05-21
Payer: MEDICARE

## 2025-05-21 DIAGNOSIS — N13.30 HYDRONEPHROSIS OF LEFT KIDNEY: Primary | ICD-10-CM

## 2025-05-21 PROCEDURE — G8420 CALC BMI NORM PARAMETERS: HCPCS | Performed by: UROLOGY

## 2025-05-21 PROCEDURE — 1036F TOBACCO NON-USER: CPT | Performed by: UROLOGY

## 2025-05-21 PROCEDURE — 1159F MED LIST DOCD IN RCRD: CPT | Performed by: UROLOGY

## 2025-05-21 PROCEDURE — 1111F DSCHRG MED/CURRENT MED MERGE: CPT | Performed by: UROLOGY

## 2025-05-21 PROCEDURE — 1123F ACP DISCUSS/DSCN MKR DOCD: CPT | Performed by: UROLOGY

## 2025-05-21 PROCEDURE — 99213 OFFICE O/P EST LOW 20 MIN: CPT | Performed by: UROLOGY

## 2025-05-21 PROCEDURE — G8427 DOCREV CUR MEDS BY ELIG CLIN: HCPCS | Performed by: UROLOGY

## 2025-05-21 NOTE — PROGRESS NOTES
been maintained with left nephroureteral stent which is capped. He has appt in radiology on 2-22-22 for tube change. He is desirous of getting the tube removed.   Currently has internal ureteral stent protruding from ileal conduit stoma.   Cr 1.47 in 3-22.  On 7-27-22 he had conversion of the nephroureteral catheter to double J catheter going from left kidney to stoma bag. Pt and radiologist decided to leave the stent in place.   I hadn't seen him since 2022. He was admitted on 4-24-25 with c/ pain around the conduit. CT at [facility] showed left hydronephrosis. Also left common femoral v thrombosis.   Left ureteral stent was changed by IR. He had placement of IVC filter.   Feels well today.   Past Medical History:   Diagnosis Date    Acute encephalopathy 6/18/2021    Arthritis     OA- bilat knees    Bladder absent     stoma present    Bladder cancer (HCC)     CAD (coronary artery disease)     CABG x 4 in 2004 - no damage- emergenc CABG- Cath 1/2011- total occlusion ramus, total mid occlusion left anterior descending, 50-70% stenosis of mid right coronary artery- \"Normal LEF\"    Cancer (HCC) 1/1/2008    bladder- chemo in bladder then 7/11 bladder removed    CRI (chronic renal insufficiency), stage 3 (moderate) (HCC)     Depression     Family history of diabetes mellitus     Heart murmur     mild mitral regurgitation    Hypercholesteremia     Hypertension     Infection     to s/p hernia wound 9/2010    Other complication of colostomy or enterostomy     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

## 2025-06-04 ENCOUNTER — TELEMEDICINE (OUTPATIENT)
Dept: FAMILY MEDICINE CLINIC | Facility: CLINIC | Age: 85
End: 2025-06-04
Payer: MEDICARE

## 2025-06-04 DIAGNOSIS — Z00.00 MEDICARE ANNUAL WELLNESS VISIT, SUBSEQUENT: Primary | ICD-10-CM

## 2025-06-04 DIAGNOSIS — Z63.8: ICD-10-CM

## 2025-06-04 PROCEDURE — 1159F MED LIST DOCD IN RCRD: CPT | Performed by: NURSE PRACTITIONER

## 2025-06-04 PROCEDURE — 1123F ACP DISCUSS/DSCN MKR DOCD: CPT | Performed by: NURSE PRACTITIONER

## 2025-06-04 PROCEDURE — G0439 PPPS, SUBSEQ VISIT: HCPCS | Performed by: NURSE PRACTITIONER

## 2025-06-04 PROCEDURE — 1160F RVW MEDS BY RX/DR IN RCRD: CPT | Performed by: NURSE PRACTITIONER

## 2025-06-04 SDOH — HEALTH STABILITY: PHYSICAL HEALTH: ON AVERAGE, HOW MANY MINUTES DO YOU ENGAGE IN EXERCISE AT THIS LEVEL?: 10 MIN

## 2025-06-04 SDOH — HEALTH STABILITY: PHYSICAL HEALTH: ON AVERAGE, HOW MANY DAYS PER WEEK DO YOU ENGAGE IN MODERATE TO STRENUOUS EXERCISE (LIKE A BRISK WALK)?: 1 DAY

## 2025-06-04 SDOH — SOCIAL STABILITY - SOCIAL INSECURITY: OTHER SPECIFIED PROBLEMS RELATED TO PRIMARY SUPPORT GROUP: Z63.8

## 2025-06-04 ASSESSMENT — LIFESTYLE VARIABLES
HOW MANY STANDARD DRINKS CONTAINING ALCOHOL DO YOU HAVE ON A TYPICAL DAY: PATIENT DOES NOT DRINK
HOW OFTEN DO YOU HAVE A DRINK CONTAINING ALCOHOL: NEVER
HOW MANY STANDARD DRINKS CONTAINING ALCOHOL DO YOU HAVE ON A TYPICAL DAY: 0
HOW OFTEN DO YOU HAVE A DRINK CONTAINING ALCOHOL: 1
HOW OFTEN DO YOU HAVE SIX OR MORE DRINKS ON ONE OCCASION: 1

## 2025-06-04 ASSESSMENT — PATIENT HEALTH QUESTIONNAIRE - PHQ9
1. LITTLE INTEREST OR PLEASURE IN DOING THINGS: NOT AT ALL
SUM OF ALL RESPONSES TO PHQ QUESTIONS 1-9: 0
2. FEELING DOWN, DEPRESSED OR HOPELESS: NOT AT ALL
SUM OF ALL RESPONSES TO PHQ QUESTIONS 1-9: 0

## 2025-06-04 NOTE — PROGRESS NOTES
Medicare Annual Wellness Visit    Kevin Clark is here for Medicare AWV (AWV)    Assessment & Plan   Medicare annual wellness visit, subsequent  Limited family support  -     [facility] - Care Coordination/Social Work - [facility] Care Management       Return in 1 year (on 6/4/2026) for Medicare AWV.     Subjective       Patient's complete Health Risk Assessment and screening values have been reviewed and are found in Flowsheets. The following problems were reviewed today and where indicated follow up appointments were made and/or referrals ordered.    Positive Risk Factor Screenings with Interventions:    Fall Risk:  Do you feel unsteady or are you worried about falling? : (!) (Patient-Rptd) yes  2 or more falls in past year?: (!) (Patient-Rptd) yes  Fall with injury in past year?: (Patient-Rptd) no     Interventions:    Reviewed medications, home hazards, visual acuity, and co-morbidities that can increase risk for falls  In PT    Cognitive:      Words recalled: 1 Word Recalled     Total Score Interpretation: Abnormal Mini-Cog  Interventions:  NOne            Inactivity:  On average, how many days per week do you engage in moderate to strenuous exercise (like a brisk walk)?: (Patient-Rptd) 1 day (!) Abnormal  On average, how many minutes do you engage in exercise at this level?: (Patient-Rptd) 10 min  Interventions:  none      Dentist Screen:  Have you seen the dentist within the past year?: (!) (Patient-Rptd) No    Intervention:  Advised to schedule with their dentist     Vision Screen:  Do you have difficulty driving, watching TV, or doing any of your daily activities because of your eyesight?: (!) (Patient-Rptd) Yes  Have you had an eye exam within the past year?: (!) (Patient-Rptd) No  Interventions:   Patient encouraged to make appointment with their eye specialist    Safety:  Do you have non-slip mats or non-slip surfaces or shower bars or grab bars in your shower or bathtub?: (!) (Patient-Rptd)

## 2025-06-05 ENCOUNTER — CARE COORDINATION (OUTPATIENT)
Dept: CARE COORDINATION | Facility: CLINIC | Age: 85
End: 2025-06-05

## 2025-06-05 NOTE — CARE COORDINATION
Spoke with the patient's son Pierce who states that he would like HCPOA documents sent to him. SW-CM will refer to ACP team.

## 2025-06-12 ENCOUNTER — OFFICE VISIT (OUTPATIENT)
Dept: FAMILY MEDICINE CLINIC | Facility: CLINIC | Age: 85
End: 2025-06-12
Payer: MEDICARE

## 2025-06-12 VITALS
HEIGHT: 70 IN | WEIGHT: 135 LBS | HEART RATE: 82 BPM | TEMPERATURE: 98.2 F | SYSTOLIC BLOOD PRESSURE: 113 MMHG | BODY MASS INDEX: 19.33 KG/M2 | DIASTOLIC BLOOD PRESSURE: 65 MMHG

## 2025-06-12 DIAGNOSIS — R53.81 PHYSICAL DEBILITY: Primary | ICD-10-CM

## 2025-06-12 DIAGNOSIS — M17.12 PRIMARY OSTEOARTHRITIS OF LEFT KNEE: ICD-10-CM

## 2025-06-12 PROCEDURE — G8420 CALC BMI NORM PARAMETERS: HCPCS | Performed by: NURSE PRACTITIONER

## 2025-06-12 PROCEDURE — 1123F ACP DISCUSS/DSCN MKR DOCD: CPT | Performed by: NURSE PRACTITIONER

## 2025-06-12 PROCEDURE — 99213 OFFICE O/P EST LOW 20 MIN: CPT | Performed by: NURSE PRACTITIONER

## 2025-06-12 PROCEDURE — 1159F MED LIST DOCD IN RCRD: CPT | Performed by: NURSE PRACTITIONER

## 2025-06-12 PROCEDURE — 3074F SYST BP LT 130 MM HG: CPT | Performed by: NURSE PRACTITIONER

## 2025-06-12 PROCEDURE — 3078F DIAST BP <80 MM HG: CPT | Performed by: NURSE PRACTITIONER

## 2025-06-12 PROCEDURE — 1036F TOBACCO NON-USER: CPT | Performed by: NURSE PRACTITIONER

## 2025-06-12 PROCEDURE — G8427 DOCREV CUR MEDS BY ELIG CLIN: HCPCS | Performed by: NURSE PRACTITIONER

## 2025-06-12 NOTE — PROGRESS NOTES
Kevin Clark (:  1940) is a 84 y.o. male,Established patient, here for evaluation of the following chief complaint(s):  3 Month Follow-Up (F4 3 mo fu-Recheck )         Assessment & Plan  Physical debility    Ordered new walker hospital bed and safety rails of bathroom as son requests         Primary osteoarthritis of left knee    Improved with injection plans to go back for another.            Return for As scheduled in September.       Subjective   HPI Here for 3 month follow up for  Dementia and debility  Son with pt . Pt smiles little verbal interaction Son states eats only when food in front of him does not seek out food. IS using an old walker that they had for his 300lb + mother that is too large to go thru the doors wishes to have normal sized walker to assist. Also is using his mothers 30+ year hospital bed and would like a replacement. Needs grab bars for bathroom as well.  Is giving him high protien meal shakes but continues ot lose weight is Eating when given food.  Son states is continue to manage in home without issues  Review of Systems       Objective   Physical Exam           /65 (BP Site: Left Upper Arm, Patient Position: Sitting, BP Cuff Size: Medium Adult)   Pulse 82   Temp 98.2 °F (36.8 °C) (Temporal)   Ht 1.778 m (5' 10\")   Wt 61.2 kg (135 lb)   BMI 19.37 kg/m²    An electronic signature was used to authenticate this note.    --ANNE MARIE Patel - NP

## 2025-06-16 DIAGNOSIS — F03.B0 MODERATE DEMENTIA WITHOUT BEHAVIORAL DISTURBANCE, PSYCHOTIC DISTURBANCE, MOOD DISTURBANCE, OR ANXIETY, UNSPECIFIED DEMENTIA TYPE (HCC): ICD-10-CM

## 2025-06-16 DIAGNOSIS — R53.81 PHYSICAL DEBILITY: Primary | ICD-10-CM

## 2025-07-11 ENCOUNTER — OFFICE VISIT (OUTPATIENT)
Dept: ORTHOPEDIC SURGERY | Age: 85
End: 2025-07-11

## 2025-07-11 ENCOUNTER — CARE COORDINATION (OUTPATIENT)
Dept: CARE COORDINATION | Facility: CLINIC | Age: 85
End: 2025-07-11

## 2025-07-11 DIAGNOSIS — M17.12 PRIMARY OSTEOARTHRITIS OF LEFT KNEE: Primary | ICD-10-CM

## 2025-07-11 DIAGNOSIS — M17.11 PRIMARY OSTEOARTHRITIS OF RIGHT KNEE: ICD-10-CM

## 2025-07-11 RX ORDER — METHYLPREDNISOLONE ACETATE 40 MG/ML
40 INJECTION, SUSPENSION INTRA-ARTICULAR; INTRALESIONAL; INTRAMUSCULAR; SOFT TISSUE ONCE
Status: COMPLETED | OUTPATIENT
Start: 2025-07-11 | End: 2025-07-11

## 2025-07-11 RX ADMIN — METHYLPREDNISOLONE ACETATE 40 MG: 40 INJECTION, SUSPENSION INTRA-ARTICULAR; INTRALESIONAL; INTRAMUSCULAR; SOFT TISSUE at 10:31

## 2025-07-11 NOTE — PROGRESS NOTES
Name: Kevin Clark  YOB: 1940  Gender: male  MRN: [id number]        Current Outpatient Medications:     simvastatin (ZOCOR) 80 MG tablet, Take 1 tablet by mouth nightly, Disp: 90 tablet, Rfl: 1    FLUoxetine (PROZAC) 20 MG capsule, Take 1 capsule by mouth daily, Disp: 90 capsule, Rfl: 1    losartan (COZAAR) 25 MG tablet, Take 1 tablet by mouth daily for blood pressure, Disp: 90 tablet, Rfl: 1    aspirin 81 MG EC tablet, Take 1 tablet by mouth daily, Disp: , Rfl:   No Known Allergies    CC: BILATERALknee pain    Patient is scheduled for Monovisc injection for the left knee today.  However, he and his son both note that his right knee has become much more painful over the past few weeks as well.  He did have good relief from the cortisone injection for the left knee.  He inquires about receiving injections for the right knee today.  After evaluating his insurance and after discussion, we ultimately decided to proceed with a cortisone injection the right knee to help settle his knee down, as well as a Monovisc injection for the right knee to help more long-term.  He was in agreement with this plan.  Please see procedure note below.    Impression: RIGHT Knee DJD    The patient's name and date of birth were confirmed prior to the injection.  The injection site was also confirmed with the patient prior to the procedure.    Procedure: Depomedrol(US) injection with US guidance    Radiology Report: Ultrasound guidance was used to examine the joint, ensure adequate needle placement and to decrease the risk of joint aggravation.  The intercondylar notch, retropatellar fat pad, patella tendon, patella and tibia were all visualized.  Pre and post injection ultrasound still images were obtained and placed in the record. Image were obtained with a Qoof ultrasound transducer (model [id number]).    Procedure Note: The RIGHT knee was prepped with alcohol. Then, under ultrasound guidance, the knee was injected

## 2025-07-11 NOTE — CARE COORDINATION
Spoke with patient's son Pierce, who states no needs at this time. He states that \[facility]\"" paperwork is mostly completed.

## 2025-07-22 ENCOUNTER — TELEPHONE (OUTPATIENT)
Dept: ORTHOPEDIC SURGERY | Age: 85
End: 2025-07-22

## 2025-07-22 NOTE — TELEPHONE ENCOUNTER
Called to schedule gel injections. Spoke with son who stated that was what he received a few weeks ago. States he was told it would last 6 months and that is why he is scheduled for January. States knows nothing about the 3 injections and that if that is the case it should be for bill knee not just right. Please call  the son. He feels like there has been a lot of miscommunication.  
Left VM to return call.   
DISPLAY PLAN FREE TEXT

## 2025-08-27 ENCOUNTER — HOSPITAL ENCOUNTER (OUTPATIENT)
Dept: INTERVENTIONAL RADIOLOGY/VASCULAR | Age: 85
Discharge: HOME | End: 2025-08-29
Attending: RADIOLOGY
Payer: MEDICARE

## 2025-08-27 VITALS
RESPIRATION RATE: 16 BRPM | SYSTOLIC BLOOD PRESSURE: 152 MMHG | HEART RATE: 50 BPM | TEMPERATURE: 98.3 F | DIASTOLIC BLOOD PRESSURE: 69 MMHG | OXYGEN SATURATION: 100 %

## 2025-08-27 PROCEDURE — 6370000000 HC RX 637 (ALT 250 FOR IP): Performed by: RADIOLOGY

## 2025-08-27 PROCEDURE — 50435 EXCHANGE NEPHROSTOMY CATH: CPT | Performed by: RADIOLOGY

## 2025-08-27 PROCEDURE — 50435 EXCHANGE NEPHROSTOMY CATH: CPT

## 2025-08-27 PROCEDURE — 2709999900 IR GUIDED NEPHROSTOMY CATH EXCHANGE

## 2025-08-27 PROCEDURE — 6360000004 HC RX CONTRAST MEDICATION: Performed by: RADIOLOGY

## 2025-08-27 RX ORDER — LIDOCAINE HYDROCHLORIDE 20 MG/ML
SOLUTION OROPHARYNGEAL PRN
Status: COMPLETED | OUTPATIENT
Start: 2025-08-27 | End: 2025-08-27

## 2025-08-27 RX ADMIN — LIDOCAINE HYDROCHLORIDE 15 ML: 20 SOLUTION ORAL at 11:10

## 2025-08-27 RX ADMIN — IOHEXOL 5 ML: 300 INJECTION, SOLUTION INTRAVENOUS at 11:25

## 2025-08-27 ASSESSMENT — PAIN SCALES - GENERAL: PAINLEVEL_OUTOF10: 0

## 2025-09-04 ENCOUNTER — LAB (OUTPATIENT)
Dept: FAMILY MEDICINE CLINIC | Facility: CLINIC | Age: 85
End: 2025-09-04

## 2025-09-04 ENCOUNTER — OFFICE VISIT (OUTPATIENT)
Dept: FAMILY MEDICINE CLINIC | Facility: CLINIC | Age: 85
End: 2025-09-04
Payer: MEDICARE

## 2025-09-04 VITALS
TEMPERATURE: 97.4 F | HEIGHT: 70 IN | DIASTOLIC BLOOD PRESSURE: 72 MMHG | WEIGHT: 132 LBS | HEART RATE: 62 BPM | SYSTOLIC BLOOD PRESSURE: 126 MMHG | BODY MASS INDEX: 18.9 KG/M2

## 2025-09-04 DIAGNOSIS — F32.5 MAJOR DEPRESSIVE DISORDER WITH SINGLE EPISODE, IN FULL REMISSION: ICD-10-CM

## 2025-09-04 DIAGNOSIS — I25.810 CORONARY ARTERY DISEASE INVOLVING CORONARY BYPASS GRAFT OF NATIVE HEART WITHOUT ANGINA PECTORIS: ICD-10-CM

## 2025-09-04 DIAGNOSIS — E78.2 MIXED HYPERLIPIDEMIA: ICD-10-CM

## 2025-09-04 DIAGNOSIS — I10 PRIMARY HYPERTENSION: ICD-10-CM

## 2025-09-04 LAB
ALBUMIN SERPL-MCNC: 3 G/DL (ref 3.2–4.6)
ALBUMIN/GLOB SERPL: 0.7 (ref 1–1.9)
ALP SERPL-CCNC: 51 U/L (ref 40–129)
ALT SERPL-CCNC: 28 U/L (ref 8–55)
ANION GAP SERPL CALC-SCNC: 11 MMOL/L (ref 7–16)
AST SERPL-CCNC: 30 U/L (ref 15–37)
BILIRUB SERPL-MCNC: 0.3 MG/DL (ref 0–1.2)
BUN SERPL-MCNC: 32 MG/DL (ref 8–23)
CALCIUM SERPL-MCNC: 8.8 MG/DL (ref 8.8–10.2)
CHLORIDE SERPL-SCNC: 101 MMOL/L (ref 98–107)
CHOLEST SERPL-MCNC: 120 MG/DL (ref 0–200)
CO2 SERPL-SCNC: 25 MMOL/L (ref 20–29)
CREAT SERPL-MCNC: 1.41 MG/DL (ref 0.8–1.3)
GLOBULIN SER CALC-MCNC: 4 G/DL (ref 2.3–3.5)
GLUCOSE SERPL-MCNC: 204 MG/DL (ref 70–99)
HDLC SERPL-MCNC: 42 MG/DL (ref 40–60)
HDLC SERPL: 2.9 (ref 0–5)
LDLC SERPL CALC-MCNC: 64 MG/DL (ref 0–100)
POTASSIUM SERPL-SCNC: 3.6 MMOL/L (ref 3.5–5.1)
PROT SERPL-MCNC: 7 G/DL (ref 6.3–8.2)
SODIUM SERPL-SCNC: 137 MMOL/L (ref 136–145)
TRIGL SERPL-MCNC: 69 MG/DL (ref 0–150)
VLDLC SERPL CALC-MCNC: 14 MG/DL (ref 6–23)

## 2025-09-04 PROCEDURE — 3074F SYST BP LT 130 MM HG: CPT | Performed by: NURSE PRACTITIONER

## 2025-09-04 PROCEDURE — 99214 OFFICE O/P EST MOD 30 MIN: CPT | Performed by: NURSE PRACTITIONER

## 2025-09-04 PROCEDURE — 1159F MED LIST DOCD IN RCRD: CPT | Performed by: NURSE PRACTITIONER

## 2025-09-04 PROCEDURE — 1036F TOBACCO NON-USER: CPT | Performed by: NURSE PRACTITIONER

## 2025-09-04 PROCEDURE — G8427 DOCREV CUR MEDS BY ELIG CLIN: HCPCS | Performed by: NURSE PRACTITIONER

## 2025-09-04 PROCEDURE — G8420 CALC BMI NORM PARAMETERS: HCPCS | Performed by: NURSE PRACTITIONER

## 2025-09-04 PROCEDURE — 1123F ACP DISCUSS/DSCN MKR DOCD: CPT | Performed by: NURSE PRACTITIONER

## 2025-09-04 PROCEDURE — 1160F RVW MEDS BY RX/DR IN RCRD: CPT | Performed by: NURSE PRACTITIONER

## 2025-09-04 PROCEDURE — 3078F DIAST BP <80 MM HG: CPT | Performed by: NURSE PRACTITIONER

## 2025-09-04 RX ORDER — ACETAMINOPHEN 500 MG
500 TABLET ORAL DAILY
COMMUNITY

## 2025-09-04 RX ORDER — LOSARTAN POTASSIUM 25 MG/1
25 TABLET ORAL DAILY
Qty: 90 TABLET | Refills: 1 | Status: SHIPPED | OUTPATIENT
Start: 2025-09-04 | End: 2026-09-04

## 2025-09-04 RX ORDER — SIMVASTATIN 80 MG
80 TABLET ORAL NIGHTLY
Qty: 90 TABLET | Refills: 1 | Status: SHIPPED | OUTPATIENT
Start: 2025-09-04

## 2025-09-05 ENCOUNTER — RESULTS FOLLOW-UP (OUTPATIENT)
Dept: FAMILY MEDICINE CLINIC | Facility: CLINIC | Age: 85
End: 2025-09-05

## 2025-09-05 DIAGNOSIS — R73.9 ELEVATED BLOOD SUGAR: Primary | ICD-10-CM

## (undated) DEVICE — TRAY,URINE METER,100% SILICONE,16FR10ML: Brand: MEDLINE

## (undated) DEVICE — Y-TYPE TUR/BLADDER IRRIGATION SET, REGULATING CLAMP

## (undated) DEVICE — JELLY LUBRICATING 10GM PREFIL SYR LUBE

## (undated) DEVICE — DRAPE,TOP,102X53,STERILE: Brand: MEDLINE

## (undated) DEVICE — 6619 2 PTNT ISO SYS INCISE AREA&LT;(&GT;&&LT;)&GT;P: Brand: STERI-DRAPE™ IOBAN™ 2

## (undated) DEVICE — SINGLE-USE DIGITAL FLEXIBLE URETEROSCOPE: Brand: LITHOVUE

## (undated) DEVICE — SUTURE ETHLN SZ 2-0 L18IN NONABSORBABLE BLK L26MM PS 3/8 585H

## (undated) DEVICE — SYR 10ML LUER LOK 1/5ML GRAD --

## (undated) DEVICE — DRAPE SHT 3 QTR PROXIMA 53X77 --

## (undated) DEVICE — PENCIL ES L3M BTTN SWCH S STL HEX LOK BLDE ELECTRD HOLSTER

## (undated) DEVICE — BLADE SURG NO15 S STL STR DISP GLASSVAN

## (undated) DEVICE — Z DUP USE 2139353 GUIDEWIRE URO L145CM DIA0.035IN PTFE STR AMPLTZ SUP STIFF

## (undated) DEVICE — SPONGE LAP 18X18IN STRL -- 5/PK

## (undated) DEVICE — CARDINAL HEALTH FLEXIBLE LIGHT HANDLE COVER: Brand: CARDINAL HEALTH

## (undated) DEVICE — GUIDEWIRE ENDOSCP L150CM DIA0.038IN TIP L3CM PTFE FLX STR

## (undated) DEVICE — BAG,DRAINAGE,4L,A/R TOWER,LL,SLIDE TAP: Brand: MEDLINE

## (undated) DEVICE — HIGH PRESSURE NEPHROSTOMY BALLOON CATHETER: Brand: NEPHROMAX

## (undated) DEVICE — PREP SKN CHLRAPRP APL 26ML STR --

## (undated) DEVICE — INFLATION DEVICE: Brand: ENCORE™ 26

## (undated) DEVICE — BLADE SCALP SURG BARD-PARK 10 --

## (undated) DEVICE — TUBING, SUCTION, 1/4" X 10', STRAIGHT: Brand: MEDLINE

## (undated) DEVICE — GARMENT,MEDLINE,DVT,INT,CALF,MED, GEN2: Brand: MEDLINE

## (undated) DEVICE — SOLUTION IRRIG 3000ML 0.9% SOD CHL FLX CONT 0797208] ICU MEDICAL INC]

## (undated) DEVICE — TOWEL SURG W17XL27IN STD BLU COT NONFENESTRATED PREWASHED

## (undated) DEVICE — C-ARM: Brand: UNBRANDED

## (undated) DEVICE — 3M™ IOBAN™ 2 ANTIMICROBIAL INCISE DRAPE 6650EZ: Brand: IOBAN™ 2

## (undated) DEVICE — DILATOR/SHEATH SET: Brand: 8/10 DILATOR/SHEATH SET

## (undated) DEVICE — PTFE WIRE GUIDE WITH 3CM FLEXIBLE TIP: Brand: COOK

## (undated) DEVICE — MINOR SPLIT GENERAL: Brand: MEDLINE INDUSTRIES, INC.

## (undated) DEVICE — SYRINGE CATH TIP 50ML

## (undated) DEVICE — AMD ANTIMICROBIAL GAUZE SPONGES,12 PLY USP TYPE VII, 0.2% POLYHEXAMETHYLENE BIGUANIDE HCI (PHMB): Brand: CURITY

## (undated) DEVICE — GUIDEWIRE URO L145CM DIA0.035IN TIP L7CM S STL PTFE BENT

## (undated) DEVICE — DUAL LUMEN URETERAL CATHETER

## (undated) DEVICE — LASER SURG FIBER 1000 MH RND TIP HOLM SMARTSCOPE DISP

## (undated) DEVICE — GUIDEWIRE VASC L180CM DIA0.035IN TIP L7CM PTFE S STL STR

## (undated) DEVICE — ENDOSCOPIC VALVE WITH ADAPTER.: Brand: SURSEAL® II

## (undated) DEVICE — CATH URETH FOL 2W SH 20FRX5ML --

## (undated) DEVICE — PREP SKN CHLRAPRP 26ML TNT -- CONVERT TO ITEM 373320

## (undated) DEVICE — SYR LR LCK 1ML GRAD NSAF 30ML --

## (undated) DEVICE — PROBE LITHO 12FR L3.76MM DISP SHOCKPULSE